# Patient Record
Sex: MALE | Race: WHITE | Employment: OTHER | URBAN - METROPOLITAN AREA
[De-identification: names, ages, dates, MRNs, and addresses within clinical notes are randomized per-mention and may not be internally consistent; named-entity substitution may affect disease eponyms.]

---

## 2017-03-10 ENCOUNTER — TELEPHONE (OUTPATIENT)
Dept: INTERNAL MEDICINE | Age: 72
End: 2017-03-10

## 2017-03-10 RX ORDER — ZOLPIDEM TARTRATE 12.5 MG/1
TABLET, FILM COATED, EXTENDED RELEASE ORAL
Qty: 90 TABLET | Refills: 0 | Status: SHIPPED | OUTPATIENT
Start: 2017-03-10 | End: 2017-04-28 | Stop reason: SDUPTHER

## 2017-03-13 ENCOUNTER — TELEPHONE (OUTPATIENT)
Dept: INTERNAL MEDICINE | Age: 72
End: 2017-03-13

## 2017-03-13 DIAGNOSIS — F41.9 ANXIETY: ICD-10-CM

## 2017-03-13 RX ORDER — LORAZEPAM 0.5 MG/1
TABLET ORAL
Qty: 30 TABLET | Refills: 0 | OUTPATIENT
Start: 2017-03-13 | End: 2017-03-23 | Stop reason: SDUPTHER

## 2017-03-23 ENCOUNTER — OFFICE VISIT (OUTPATIENT)
Dept: INTERNAL MEDICINE | Age: 72
End: 2017-03-23

## 2017-03-23 VITALS
DIASTOLIC BLOOD PRESSURE: 90 MMHG | HEIGHT: 68 IN | BODY MASS INDEX: 31.98 KG/M2 | SYSTOLIC BLOOD PRESSURE: 160 MMHG | WEIGHT: 211 LBS

## 2017-03-23 DIAGNOSIS — F41.9 ANXIETY: ICD-10-CM

## 2017-03-23 DIAGNOSIS — G47.33 OBSTRUCTIVE SLEEP APNEA SYNDROME: Primary | ICD-10-CM

## 2017-03-23 PROCEDURE — 99213 OFFICE O/P EST LOW 20 MIN: CPT | Performed by: INTERNAL MEDICINE

## 2017-03-23 RX ORDER — LORAZEPAM 0.5 MG/1
TABLET ORAL
Qty: 90 TABLET | Refills: 0 | Status: SHIPPED | OUTPATIENT
Start: 2017-03-23 | End: 2017-05-25 | Stop reason: SDUPTHER

## 2017-03-28 ENCOUNTER — OFFICE VISIT (OUTPATIENT)
Dept: DERMATOLOGY | Age: 72
End: 2017-03-28

## 2017-03-28 DIAGNOSIS — C44.519 BCC (BASAL CELL CARCINOMA), TRUNK: ICD-10-CM

## 2017-03-28 DIAGNOSIS — B35.1 ONYCHOMYCOSIS: ICD-10-CM

## 2017-03-28 DIAGNOSIS — Z85.828 HISTORY OF BASAL CELL CARCINOMA: ICD-10-CM

## 2017-03-28 DIAGNOSIS — L82.1 SK (SEBORRHEIC KERATOSIS): ICD-10-CM

## 2017-03-28 DIAGNOSIS — L57.0 AK (ACTINIC KERATOSIS): Primary | ICD-10-CM

## 2017-03-28 PROCEDURE — 99213 OFFICE O/P EST LOW 20 MIN: CPT | Performed by: DERMATOLOGY

## 2017-03-28 PROCEDURE — 17261 DSTRJ MAL LES T/A/L .6-1.0CM: CPT | Performed by: DERMATOLOGY

## 2017-03-31 ENCOUNTER — TELEPHONE (OUTPATIENT)
Dept: DERMATOLOGY | Age: 72
End: 2017-03-31

## 2017-04-11 RX ORDER — TAMSULOSIN HYDROCHLORIDE 0.4 MG/1
CAPSULE ORAL
Qty: 90 CAPSULE | Refills: 0 | Status: SHIPPED | OUTPATIENT
Start: 2017-04-11 | End: 2017-08-12 | Stop reason: SDUPTHER

## 2017-04-20 ENCOUNTER — OFFICE VISIT (OUTPATIENT)
Dept: INTERNAL MEDICINE | Age: 72
End: 2017-04-20

## 2017-04-20 ENCOUNTER — TELEPHONE (OUTPATIENT)
Dept: DERMATOLOGY | Age: 72
End: 2017-04-20

## 2017-04-20 VITALS
BODY MASS INDEX: 31.37 KG/M2 | DIASTOLIC BLOOD PRESSURE: 80 MMHG | SYSTOLIC BLOOD PRESSURE: 132 MMHG | WEIGHT: 207 LBS | HEIGHT: 68 IN

## 2017-04-20 DIAGNOSIS — Z01.818 PRE-OP EXAM: Primary | ICD-10-CM

## 2017-04-20 DIAGNOSIS — L57.0 AK (ACTINIC KERATOSIS): ICD-10-CM

## 2017-04-20 DIAGNOSIS — G47.33 OBSTRUCTIVE SLEEP APNEA SYNDROME: ICD-10-CM

## 2017-04-20 DIAGNOSIS — I25.118 CORONARY ARTERY DISEASE WITH OTHER FORM OF ANGINA PECTORIS: ICD-10-CM

## 2017-04-20 DIAGNOSIS — F51.01 PRIMARY INSOMNIA: ICD-10-CM

## 2017-04-20 DIAGNOSIS — N40.0 BENIGN PROSTATIC HYPERPLASIA WITHOUT LOWER URINARY TRACT SYMPTOMS, UNSPECIFIED MORPHOLOGY: ICD-10-CM

## 2017-04-20 PROCEDURE — 99242 OFF/OP CONSLTJ NEW/EST SF 20: CPT | Performed by: HOSPITALIST

## 2017-04-20 ASSESSMENT — ENCOUNTER SYMPTOMS
EYES NEGATIVE: 1
HEARTBURN: 1
RESPIRATORY NEGATIVE: 1

## 2017-04-28 RX ORDER — ZOLPIDEM TARTRATE 12.5 MG/1
TABLET, FILM COATED, EXTENDED RELEASE ORAL
Qty: 90 TABLET | Refills: 0 | OUTPATIENT
Start: 2017-04-28 | End: 2017-06-23 | Stop reason: SDUPTHER

## 2017-05-23 ENCOUNTER — TELEPHONE (OUTPATIENT)
Dept: INTERNAL MEDICINE | Age: 72
End: 2017-05-23

## 2017-05-25 ENCOUNTER — TELEPHONE (OUTPATIENT)
Dept: INTERNAL MEDICINE | Age: 72
End: 2017-05-25

## 2017-05-25 DIAGNOSIS — F41.9 ANXIETY: ICD-10-CM

## 2017-05-25 RX ORDER — LORAZEPAM 0.5 MG/1
TABLET ORAL
Qty: 90 TABLET | Refills: 0 | OUTPATIENT
Start: 2017-05-25 | End: 2017-06-23 | Stop reason: SDUPTHER

## 2017-05-26 ENCOUNTER — PROCEDURE VISIT (OUTPATIENT)
Dept: DERMATOLOGY | Age: 72
End: 2017-05-26

## 2017-05-26 VITALS
SYSTOLIC BLOOD PRESSURE: 136 MMHG | WEIGHT: 202.4 LBS | DIASTOLIC BLOOD PRESSURE: 82 MMHG | HEART RATE: 85 BPM | BODY MASS INDEX: 30.77 KG/M2

## 2017-05-26 DIAGNOSIS — D48.5 NEOPLASM OF UNCERTAIN BEHAVIOR OF SKIN: Primary | ICD-10-CM

## 2017-05-26 PROCEDURE — 11100 PR BIOPSY OF SKIN LESION: CPT | Performed by: DERMATOLOGY

## 2017-06-01 ENCOUNTER — TELEPHONE (OUTPATIENT)
Dept: DERMATOLOGY | Age: 72
End: 2017-06-01

## 2017-06-23 ENCOUNTER — OFFICE VISIT (OUTPATIENT)
Dept: INTERNAL MEDICINE | Age: 72
End: 2017-06-23

## 2017-06-23 ENCOUNTER — PROCEDURE VISIT (OUTPATIENT)
Dept: DERMATOLOGY | Age: 72
End: 2017-06-23

## 2017-06-23 VITALS — DIASTOLIC BLOOD PRESSURE: 84 MMHG | HEART RATE: 98 BPM | SYSTOLIC BLOOD PRESSURE: 129 MMHG

## 2017-06-23 VITALS
BODY MASS INDEX: 30.01 KG/M2 | HEIGHT: 68 IN | DIASTOLIC BLOOD PRESSURE: 80 MMHG | WEIGHT: 198 LBS | SYSTOLIC BLOOD PRESSURE: 130 MMHG

## 2017-06-23 DIAGNOSIS — G47.33 OBSTRUCTIVE SLEEP APNEA SYNDROME: ICD-10-CM

## 2017-06-23 DIAGNOSIS — F41.9 ANXIETY: ICD-10-CM

## 2017-06-23 DIAGNOSIS — C44.712 BCC (BASAL CELL CARCINOMA), LEG, RIGHT: Primary | ICD-10-CM

## 2017-06-23 DIAGNOSIS — F51.01 PRIMARY INSOMNIA: Primary | ICD-10-CM

## 2017-06-23 PROCEDURE — 99213 OFFICE O/P EST LOW 20 MIN: CPT | Performed by: INTERNAL MEDICINE

## 2017-06-23 PROCEDURE — 11602 EXC TR-EXT MAL+MARG 1.1-2 CM: CPT | Performed by: DERMATOLOGY

## 2017-06-23 PROCEDURE — 12032 INTMD RPR S/A/T/EXT 2.6-7.5: CPT | Performed by: DERMATOLOGY

## 2017-06-23 RX ORDER — ZOLPIDEM TARTRATE 12.5 MG/1
12.5 TABLET, FILM COATED, EXTENDED RELEASE ORAL NIGHTLY PRN
Qty: 90 TABLET | Refills: 0 | Status: SHIPPED | OUTPATIENT
Start: 2017-06-23 | End: 2017-08-14 | Stop reason: SDUPTHER

## 2017-06-23 RX ORDER — LORAZEPAM 0.5 MG/1
TABLET ORAL
Qty: 90 TABLET | Refills: 0 | Status: SHIPPED | OUTPATIENT
Start: 2017-06-23 | End: 2017-08-30 | Stop reason: SDUPTHER

## 2017-06-28 ENCOUNTER — TELEPHONE (OUTPATIENT)
Dept: DERMATOLOGY | Age: 72
End: 2017-06-28

## 2017-07-07 ENCOUNTER — NURSE ONLY (OUTPATIENT)
Dept: DERMATOLOGY | Age: 72
End: 2017-07-07

## 2017-07-07 PROCEDURE — 99024 POSTOP FOLLOW-UP VISIT: CPT | Performed by: DERMATOLOGY

## 2017-07-07 RX ORDER — MINOCYCLINE HYDROCHLORIDE 100 MG/1
100 CAPSULE ORAL 2 TIMES DAILY
Qty: 20 CAPSULE | Refills: 0 | Status: SHIPPED | OUTPATIENT
Start: 2017-07-07 | End: 2017-07-17

## 2017-08-14 ENCOUNTER — TELEPHONE (OUTPATIENT)
Dept: INTERNAL MEDICINE | Age: 72
End: 2017-08-14

## 2017-08-14 DIAGNOSIS — F51.01 PRIMARY INSOMNIA: ICD-10-CM

## 2017-08-14 RX ORDER — ZOLPIDEM TARTRATE 12.5 MG/1
12.5 TABLET, FILM COATED, EXTENDED RELEASE ORAL NIGHTLY PRN
Qty: 30 TABLET | Refills: 4 | OUTPATIENT
Start: 2017-08-14 | End: 2017-12-18 | Stop reason: SDUPTHER

## 2017-08-15 RX ORDER — TAMSULOSIN HYDROCHLORIDE 0.4 MG/1
CAPSULE ORAL
Qty: 90 CAPSULE | Refills: 0 | Status: SHIPPED | OUTPATIENT
Start: 2017-08-15 | End: 2017-08-18 | Stop reason: SDUPTHER

## 2017-08-18 RX ORDER — TAMSULOSIN HYDROCHLORIDE 0.4 MG/1
CAPSULE ORAL
Qty: 90 CAPSULE | Refills: 0 | Status: SHIPPED | OUTPATIENT
Start: 2017-08-18 | End: 2018-02-05 | Stop reason: SDUPTHER

## 2017-08-30 DIAGNOSIS — F41.9 ANXIETY: ICD-10-CM

## 2017-08-31 RX ORDER — LORAZEPAM 0.5 MG/1
TABLET ORAL
Qty: 90 TABLET | Refills: 0 | OUTPATIENT
Start: 2017-08-31 | End: 2017-09-22 | Stop reason: SDUPTHER

## 2017-09-22 ENCOUNTER — OFFICE VISIT (OUTPATIENT)
Dept: INTERNAL MEDICINE | Age: 72
End: 2017-09-22

## 2017-09-22 VITALS
DIASTOLIC BLOOD PRESSURE: 84 MMHG | BODY MASS INDEX: 29.55 KG/M2 | SYSTOLIC BLOOD PRESSURE: 136 MMHG | WEIGHT: 195 LBS | HEIGHT: 68 IN

## 2017-09-22 DIAGNOSIS — I25.118 CORONARY ARTERY DISEASE INVOLVING NATIVE CORONARY ARTERY WITH OTHER FORM OF ANGINA PECTORIS, UNSPECIFIED WHETHER NATIVE OR TRANSPLANTED HEART (HCC): Primary | ICD-10-CM

## 2017-09-22 DIAGNOSIS — F41.9 ANXIETY: ICD-10-CM

## 2017-09-22 PROCEDURE — 99213 OFFICE O/P EST LOW 20 MIN: CPT | Performed by: INTERNAL MEDICINE

## 2017-09-22 RX ORDER — LORAZEPAM 0.5 MG/1
TABLET ORAL
Qty: 90 TABLET | Refills: 0 | Status: SHIPPED | OUTPATIENT
Start: 2017-09-22 | End: 2017-12-18 | Stop reason: SDUPTHER

## 2017-11-01 ENCOUNTER — HOSPITAL ENCOUNTER (OUTPATIENT)
Dept: OTHER | Age: 72
Discharge: HOME | End: 2017-11-30
Attending: INTERNAL MEDICINE | Admitting: INTERNAL MEDICINE

## 2017-11-27 ENCOUNTER — TELEPHONE (OUTPATIENT)
Dept: DERMATOLOGY | Age: 72
End: 2017-11-27

## 2017-11-27 RX ORDER — EFINACONAZOLE 100 MG/ML
SOLUTION TOPICAL
Qty: 4 ML | Refills: 0 | Status: SHIPPED | OUTPATIENT
Start: 2017-11-27 | End: 2018-08-16 | Stop reason: SDUPTHER

## 2017-11-27 NOTE — TELEPHONE ENCOUNTER
Doris joseph/anila [phone]  Doris Nuñez states:   - calling from [facility]   - refill request  Medication  [address]  [phone]  Please call to discuss thanks

## 2017-12-01 ENCOUNTER — HOSPITAL ENCOUNTER (OUTPATIENT)
Dept: OTHER | Age: 72
Discharge: HOME | End: 2017-12-31
Attending: INTERNAL MEDICINE | Admitting: INTERNAL MEDICINE

## 2017-12-18 ENCOUNTER — OFFICE VISIT (OUTPATIENT)
Dept: INTERNAL MEDICINE | Age: 72
End: 2017-12-18

## 2017-12-18 VITALS
WEIGHT: 194 LBS | HEIGHT: 68 IN | DIASTOLIC BLOOD PRESSURE: 86 MMHG | BODY MASS INDEX: 29.4 KG/M2 | SYSTOLIC BLOOD PRESSURE: 120 MMHG

## 2017-12-18 DIAGNOSIS — M19.172 POST-TRAUMATIC OSTEOARTHRITIS OF LEFT ANKLE: Primary | ICD-10-CM

## 2017-12-18 DIAGNOSIS — F41.9 ANXIETY: ICD-10-CM

## 2017-12-18 DIAGNOSIS — F51.01 PRIMARY INSOMNIA: ICD-10-CM

## 2017-12-18 PROCEDURE — 99213 OFFICE O/P EST LOW 20 MIN: CPT | Performed by: INTERNAL MEDICINE

## 2017-12-18 RX ORDER — LORAZEPAM 0.5 MG/1
TABLET ORAL
Qty: 90 TABLET | Refills: 0 | Status: SHIPPED | OUTPATIENT
Start: 2017-12-18 | End: 2018-04-08 | Stop reason: SDUPTHER

## 2017-12-18 RX ORDER — ZOLPIDEM TARTRATE 12.5 MG/1
12.5 TABLET, FILM COATED, EXTENDED RELEASE ORAL NIGHTLY PRN
Qty: 30 TABLET | Refills: 5 | Status: SHIPPED | OUTPATIENT
Start: 2017-12-18 | End: 2018-01-10 | Stop reason: SDUPTHER

## 2017-12-18 ASSESSMENT — PATIENT HEALTH QUESTIONNAIRE - PHQ9
1. LITTLE INTEREST OR PLEASURE IN DOING THINGS: 0
SUM OF ALL RESPONSES TO PHQ9 QUESTIONS 1 & 2: 0
2. FEELING DOWN, DEPRESSED OR HOPELESS: 0
SUM OF ALL RESPONSES TO PHQ QUESTIONS 1-9: 0

## 2017-12-18 NOTE — PROGRESS NOTES
Follow Up Visit  Established Patient Visit    Patient:  Gaurav Verdugo                                               : 1945  Age: 81 y.o. MRN: [id number]  Date : 2017      CHIEF COMPLAINT: Gaurav Verdugo is a 81 y.o. male who presents for follow up. Anxiety-Takes ativan as needed. Sometimes needs 0 pills on 1 day and sometimes needs 2. He often tries to exercise when he feels anxious and sometimes this helps him as well. He is sleeping well. Insomnia-Takes Ambien when needed. Sleeping well, stays active during the day. Ankle pain-Left ankle underwent reconstruction several years ago, now having sharp pains occasionally. The pains occur after he steps on his foot after prolonged rest, such as in his recliner. It is a shooting pain \"like lightning\". Chief Complaint   Patient presents with    Medication Check     Ativan, Ambien       Past Medical History:   Diagnosis Date    Anxiety     BPH (benign prostatic hyperplasia) 2012    CAD (coronary artery disease)     Hyperlipidemia     LBBB (left bundle branch block) 2012    Sleep apnea 2011       Past Surgical History:   Procedure Laterality Date    ANKLE FRACTURE SURGERY         Current Outpatient Prescriptions   Medication Sig Dispense Refill    LORazepam (ATIVAN) 0.5 MG tablet TAKE 1 TABLET BY MOUTH EVERY 8 HOURS AS NEEDED. 90 tablet 0    zolpidem (AMBIEN CR) 12.5 MG extended release tablet Take 1 tablet by mouth nightly as needed for Sleep . 30 tablet 5    JUBLIA 10 % SOLN APPLY TO THE RIGHT BIG TONAIL AND 4TH TOENAIL ONCE A DAY AFTER BATHING 4 mL 0    tamsulosin (FLOMAX) 0.4 MG capsule TAKE 1 CAPSULE BY MOUTH EVERY DAY 90 capsule 0    fluorouracil (EFUDEX) 5 % cream Apply to the forehead, temples and sides of the cheeks 2 times daily for 14 days. 40 g 0    Vitamin D (CHOLECALCIFEROL) 1000 UNITS CAPS capsule Take 1,000 Units by mouth daily.  clopidogrel (PLAVIX) 75 MG tablet Take 75 mg by mouth daily.

## 2018-01-01 ENCOUNTER — HOSPITAL ENCOUNTER (OUTPATIENT)
Dept: OTHER | Age: 73
Discharge: HOME | End: 2018-01-31
Attending: INTERNAL MEDICINE | Admitting: INTERNAL MEDICINE

## 2018-01-03 ENCOUNTER — OFFICE VISIT (OUTPATIENT)
Dept: DERMATOLOGY | Age: 73
End: 2018-01-03

## 2018-01-03 DIAGNOSIS — L82.1 SK (SEBORRHEIC KERATOSIS): ICD-10-CM

## 2018-01-03 DIAGNOSIS — L57.0 AK (ACTINIC KERATOSIS): Primary | ICD-10-CM

## 2018-01-03 DIAGNOSIS — D48.5 NEOPLASM OF UNCERTAIN BEHAVIOR OF SKIN: ICD-10-CM

## 2018-01-03 PROCEDURE — 99213 OFFICE O/P EST LOW 20 MIN: CPT | Performed by: DERMATOLOGY

## 2018-01-03 PROCEDURE — 17261 DSTRJ MAL LES T/A/L .6-1.0CM: CPT | Performed by: DERMATOLOGY

## 2018-01-05 ENCOUNTER — TELEPHONE (OUTPATIENT)
Dept: DERMATOLOGY | Age: 73
End: 2018-01-05

## 2018-01-10 DIAGNOSIS — F51.01 PRIMARY INSOMNIA: ICD-10-CM

## 2018-01-15 RX ORDER — ZOLPIDEM TARTRATE 12.5 MG/1
TABLET, FILM COATED, EXTENDED RELEASE ORAL
Qty: 30 TABLET | Refills: 2 | Status: SHIPPED | OUTPATIENT
Start: 2018-01-15 | End: 2018-04-11 | Stop reason: SDUPTHER

## 2018-02-01 ENCOUNTER — HOSPITAL ENCOUNTER (OUTPATIENT)
Dept: OTHER | Age: 73
Discharge: HOME | End: 2018-02-28
Attending: INTERNAL MEDICINE | Admitting: INTERNAL MEDICINE

## 2018-02-07 RX ORDER — TAMSULOSIN HYDROCHLORIDE 0.4 MG/1
CAPSULE ORAL
Qty: 90 CAPSULE | Refills: 0 | Status: SHIPPED | OUTPATIENT
Start: 2018-02-07 | End: 2018-05-06 | Stop reason: SDUPTHER

## 2018-03-01 ENCOUNTER — HOSPITAL ENCOUNTER (OUTPATIENT)
Dept: OTHER | Age: 73
Discharge: HOME | End: 2018-03-31
Attending: INTERNAL MEDICINE | Admitting: INTERNAL MEDICINE

## 2018-04-01 ENCOUNTER — HOSPITAL ENCOUNTER (OUTPATIENT)
Dept: OTHER | Age: 73
Discharge: HOME | End: 2018-04-30
Attending: INTERNAL MEDICINE | Admitting: INTERNAL MEDICINE

## 2018-04-08 DIAGNOSIS — F41.9 ANXIETY: ICD-10-CM

## 2018-04-11 ENCOUNTER — TELEPHONE (OUTPATIENT)
Dept: INTERNAL MEDICINE | Age: 73
End: 2018-04-11

## 2018-04-11 DIAGNOSIS — F51.01 PRIMARY INSOMNIA: ICD-10-CM

## 2018-04-11 RX ORDER — ZOLPIDEM TARTRATE 12.5 MG/1
TABLET, FILM COATED, EXTENDED RELEASE ORAL
Qty: 30 TABLET | Refills: 0 | OUTPATIENT
Start: 2018-04-11 | End: 2018-06-27 | Stop reason: SDUPTHER

## 2018-04-11 RX ORDER — LORAZEPAM 0.5 MG/1
TABLET ORAL
Qty: 90 TABLET | Refills: 0 | Status: SHIPPED | OUTPATIENT
Start: 2018-04-11 | End: 2018-05-11

## 2018-04-25 ENCOUNTER — OFFICE VISIT (OUTPATIENT)
Dept: INTERNAL MEDICINE | Age: 73
End: 2018-04-25

## 2018-04-25 VITALS
DIASTOLIC BLOOD PRESSURE: 86 MMHG | BODY MASS INDEX: 29.86 KG/M2 | WEIGHT: 197 LBS | SYSTOLIC BLOOD PRESSURE: 138 MMHG | HEIGHT: 68 IN

## 2018-04-25 DIAGNOSIS — G47.33 OSA ON CPAP: ICD-10-CM

## 2018-04-25 DIAGNOSIS — F51.01 PRIMARY INSOMNIA: Primary | ICD-10-CM

## 2018-04-25 DIAGNOSIS — F41.9 ANXIETY: ICD-10-CM

## 2018-04-25 DIAGNOSIS — Z99.89 OSA ON CPAP: ICD-10-CM

## 2018-04-25 DIAGNOSIS — J02.9 PHARYNGITIS, UNSPECIFIED ETIOLOGY: ICD-10-CM

## 2018-04-25 PROCEDURE — 99213 OFFICE O/P EST LOW 20 MIN: CPT | Performed by: INTERNAL MEDICINE

## 2018-04-25 RX ORDER — METHYLPREDNISOLONE 4 MG/1
TABLET ORAL
Qty: 1 KIT | Refills: 0 | Status: SHIPPED | OUTPATIENT
Start: 2018-04-25 | End: 2018-05-01

## 2018-05-01 ENCOUNTER — HOSPITAL ENCOUNTER (OUTPATIENT)
Dept: OTHER | Age: 73
Discharge: HOME | End: 2018-05-31
Attending: INTERNAL MEDICINE | Admitting: INTERNAL MEDICINE

## 2018-05-07 RX ORDER — TAMSULOSIN HYDROCHLORIDE 0.4 MG/1
CAPSULE ORAL
Qty: 90 CAPSULE | Refills: 0 | Status: SHIPPED | OUTPATIENT
Start: 2018-05-07 | End: 2018-08-02 | Stop reason: SDUPTHER

## 2018-06-01 ENCOUNTER — HOSPITAL ENCOUNTER (OUTPATIENT)
Dept: OTHER | Age: 73
Discharge: HOME | End: 2018-06-30
Attending: INTERNAL MEDICINE | Admitting: INTERNAL MEDICINE

## 2018-06-27 ENCOUNTER — TELEPHONE (OUTPATIENT)
Dept: INTERNAL MEDICINE | Age: 73
End: 2018-06-27

## 2018-06-27 DIAGNOSIS — F51.01 PRIMARY INSOMNIA: ICD-10-CM

## 2018-06-27 RX ORDER — ZOLPIDEM TARTRATE 12.5 MG/1
TABLET, FILM COATED, EXTENDED RELEASE ORAL
Qty: 30 TABLET | Refills: 1 | OUTPATIENT
Start: 2018-06-27 | End: 2018-07-03 | Stop reason: SDUPTHER

## 2018-07-01 ENCOUNTER — HOSPITAL ENCOUNTER (OUTPATIENT)
Dept: OTHER | Age: 73
Discharge: HOME | End: 2018-07-01
Attending: INTERNAL MEDICINE | Admitting: INTERNAL MEDICINE

## 2018-07-03 DIAGNOSIS — F51.01 PRIMARY INSOMNIA: ICD-10-CM

## 2018-07-03 RX ORDER — ZOLPIDEM TARTRATE 12.5 MG/1
TABLET, FILM COATED, EXTENDED RELEASE ORAL
Qty: 30 TABLET | Refills: 0 | OUTPATIENT
Start: 2018-07-03 | End: 2018-07-25 | Stop reason: SDUPTHER

## 2018-07-25 ENCOUNTER — OFFICE VISIT (OUTPATIENT)
Dept: INTERNAL MEDICINE | Age: 73
End: 2018-07-25

## 2018-07-25 VITALS
WEIGHT: 198 LBS | DIASTOLIC BLOOD PRESSURE: 82 MMHG | SYSTOLIC BLOOD PRESSURE: 130 MMHG | HEIGHT: 68 IN | BODY MASS INDEX: 30.01 KG/M2

## 2018-07-25 DIAGNOSIS — Z23 NEED FOR ZOSTAVAX ADMINISTRATION: Primary | ICD-10-CM

## 2018-07-25 DIAGNOSIS — F51.01 PRIMARY INSOMNIA: ICD-10-CM

## 2018-07-25 PROCEDURE — 99213 OFFICE O/P EST LOW 20 MIN: CPT | Performed by: INTERNAL MEDICINE

## 2018-07-25 RX ORDER — ZOLPIDEM TARTRATE 12.5 MG/1
TABLET, FILM COATED, EXTENDED RELEASE ORAL
Qty: 30 TABLET | Refills: 2 | Status: SHIPPED | OUTPATIENT
Start: 2018-07-25 | End: 2018-10-25 | Stop reason: SDUPTHER

## 2018-07-25 ASSESSMENT — ENCOUNTER SYMPTOMS
GASTROINTESTINAL NEGATIVE: 1
CHEST TIGHTNESS: 0
SHORTNESS OF BREATH: 0

## 2018-07-25 NOTE — PROGRESS NOTES
Subjective:      Patient ID: Kenny Hunter is a 81 y.o. male. HPI   In for a med refills. The medications are helping him sleep and deal with stress. He has not had any other symptoms and is doing well. Review of Systems   Constitutional: Negative for appetite change, chills, fever and unexpected weight change. Respiratory: Negative for chest tightness and shortness of breath. Cardiovascular: Negative for chest pain, palpitations and leg swelling. Gastrointestinal: Negative. Neurological: Negative for dizziness, tremors, seizures, syncope, light-headedness and headaches. Psychiatric/Behavioral: Negative for agitation, behavioral problems, confusion, decreased concentration, dysphoric mood and hallucinations. The patient is not nervous/anxious. Objective:   Physical Exam   Constitutional: He appears well-developed and well-nourished. Cardiovascular: Normal rate and regular rhythm. Exam reveals no gallop and no friction rub. No murmur heard. Pulmonary/Chest: Effort normal and breath sounds normal. No respiratory distress. He has no wheezes. He has no rales. He exhibits no tenderness. Abdominal: Soft. Bowel sounds are normal. He exhibits no distension and no mass. There is no tenderness. There is no rebound and no guarding. Musculoskeletal: He exhibits no edema. Psychiatric: He has a normal mood and affect.  His behavior is normal. Judgment and thought content normal.       Assessment:      Insomnia controlled with Ambien continue present meds no evidence of addiction or dependency      Plan:      Ambien prescription given follow-up 3 months

## 2018-08-02 DIAGNOSIS — F41.9 ANXIETY: Primary | ICD-10-CM

## 2018-08-02 RX ORDER — TAMSULOSIN HYDROCHLORIDE 0.4 MG/1
CAPSULE ORAL
Qty: 90 CAPSULE | Refills: 0 | Status: SHIPPED | OUTPATIENT
Start: 2018-08-02 | End: 2018-10-29 | Stop reason: SDUPTHER

## 2018-08-02 RX ORDER — TAMSULOSIN HYDROCHLORIDE 0.4 MG/1
CAPSULE ORAL
Qty: 90 CAPSULE | Refills: 0 | OUTPATIENT
Start: 2018-08-02

## 2018-08-03 RX ORDER — LORAZEPAM 0.5 MG/1
TABLET ORAL
Qty: 90 TABLET | Refills: 0 | OUTPATIENT
Start: 2018-08-03 | End: 2018-08-04 | Stop reason: SDUPTHER

## 2018-08-04 DIAGNOSIS — F41.9 ANXIETY: ICD-10-CM

## 2018-08-06 RX ORDER — LORAZEPAM 0.5 MG/1
TABLET ORAL
Qty: 90 TABLET | Refills: 0 | OUTPATIENT
Start: 2018-08-06 | End: 2018-09-05

## 2018-08-06 NOTE — TELEPHONE ENCOUNTER
Patient was seen 7/25 and is looking for the refill of his lorazepam 0.5 mg. Patient says pharmacy has made several requests to refill.     Please call patient with update on prescription

## 2018-08-16 RX ORDER — EFINACONAZOLE 100 MG/ML
SOLUTION TOPICAL
Qty: 4 ML | Refills: 0 | Status: SHIPPED | OUTPATIENT
Start: 2018-08-16

## 2018-09-24 DIAGNOSIS — F41.9 ANXIETY: Primary | ICD-10-CM

## 2018-09-24 DIAGNOSIS — F41.9 ANXIETY: ICD-10-CM

## 2018-09-24 RX ORDER — LORAZEPAM 1 MG/1
TABLET ORAL
Qty: 45 TABLET | Refills: 0 | OUTPATIENT
Start: 2018-09-24 | End: 2018-09-24 | Stop reason: SDUPTHER

## 2018-09-25 RX ORDER — LORAZEPAM 1 MG/1
TABLET ORAL
Qty: 45 TABLET | Refills: 0 | OUTPATIENT
Start: 2018-09-25 | End: 2018-10-25

## 2018-10-05 ENCOUNTER — TELEPHONE (OUTPATIENT)
Dept: INTERNAL MEDICINE CLINIC | Age: 73
End: 2018-10-05

## 2018-10-05 NOTE — TELEPHONE ENCOUNTER
Pt came in because he was under the impression that his refill was sent in a few days ago  Pt never got the refill request sent in   He would like zolpidem (AMBIEN CR) 12.5 MG asap   He is currently out of this  Please advise

## 2018-10-08 DIAGNOSIS — E78.5 HYPERLIPIDEMIA, UNSPECIFIED HYPERLIPIDEMIA TYPE: ICD-10-CM

## 2018-10-08 DIAGNOSIS — Z95.5 S/P CORONARY ARTERY STENT PLACEMENT: ICD-10-CM

## 2018-10-08 DIAGNOSIS — I45.10 RBBB (RIGHT BUNDLE BRANCH BLOCK): ICD-10-CM

## 2018-10-08 DIAGNOSIS — R55 SYNCOPE, NEAR: ICD-10-CM

## 2018-10-08 RX ORDER — CLOPIDOGREL BISULFATE 75 MG/1
75 TABLET ORAL DAILY
Qty: 90 TABLET | Refills: 0 | Status: SHIPPED | OUTPATIENT
Start: 2018-10-08 | End: 2019-02-07 | Stop reason: SDUPTHER

## 2018-10-23 ENCOUNTER — OFFICE VISIT (OUTPATIENT)
Dept: DERMATOLOGY | Age: 73
End: 2018-10-23
Payer: COMMERCIAL

## 2018-10-23 DIAGNOSIS — L57.0 AK (ACTINIC KERATOSIS): Primary | ICD-10-CM

## 2018-10-23 DIAGNOSIS — C44.519 BCC (BASAL CELL CARCINOMA), TRUNK: ICD-10-CM

## 2018-10-23 PROCEDURE — 17004 DESTROY PREMAL LESIONS 15/>: CPT | Performed by: DERMATOLOGY

## 2018-10-23 PROCEDURE — 17260 DSTRJ MAL LES T/A/L 0.5 CM/<: CPT | Performed by: DERMATOLOGY

## 2018-10-23 NOTE — PATIENT INSTRUCTIONS
film of white petrolatum (Vaseline©).  You do not need to cover the area, but can if you prefer.  Do NOT allow the site to become dry or crusted, or attempt to dry it out with rubbing alcohol or hydrogen peroxide.  Continue this regimen until the area is pink and healed. Depending on the size and location of your cryosurgery site, healing may take 2 to 4 weeks.  The area may continue to be pink for several weeks, and over the next few months may become darker or lighter than the surrounding skin. This may be a permanent change.

## 2018-10-25 ENCOUNTER — OFFICE VISIT (OUTPATIENT)
Dept: INTERNAL MEDICINE CLINIC | Age: 73
End: 2018-10-25
Payer: COMMERCIAL

## 2018-10-25 VITALS
BODY MASS INDEX: 30.92 KG/M2 | DIASTOLIC BLOOD PRESSURE: 80 MMHG | WEIGHT: 204 LBS | SYSTOLIC BLOOD PRESSURE: 140 MMHG | HEIGHT: 68 IN

## 2018-10-25 DIAGNOSIS — I25.119 CORONARY ARTERY DISEASE INVOLVING NATIVE CORONARY ARTERY WITH ANGINA PECTORIS, UNSPECIFIED WHETHER NATIVE OR TRANSPLANTED HEART (HCC): Primary | ICD-10-CM

## 2018-10-25 DIAGNOSIS — E78.00 PURE HYPERCHOLESTEROLEMIA: ICD-10-CM

## 2018-10-25 DIAGNOSIS — F41.9 ANXIETY: ICD-10-CM

## 2018-10-25 DIAGNOSIS — F51.01 PRIMARY INSOMNIA: ICD-10-CM

## 2018-10-25 LAB — DERMATOLOGY PATHOLOGY REPORT: ABNORMAL

## 2018-10-25 PROCEDURE — 99213 OFFICE O/P EST LOW 20 MIN: CPT | Performed by: INTERNAL MEDICINE

## 2018-10-25 RX ORDER — ZOLPIDEM TARTRATE 12.5 MG/1
TABLET, FILM COATED, EXTENDED RELEASE ORAL
Qty: 30 TABLET | Refills: 2 | Status: SHIPPED | OUTPATIENT
Start: 2018-10-25 | End: 2019-04-12 | Stop reason: SDUPTHER

## 2018-10-25 RX ORDER — LORAZEPAM 0.5 MG/1
TABLET ORAL
Qty: 90 TABLET | Refills: 2 | Status: SHIPPED | OUTPATIENT
Start: 2018-10-25 | End: 2019-01-03 | Stop reason: SDUPTHER

## 2018-10-25 ASSESSMENT — PATIENT HEALTH QUESTIONNAIRE - PHQ9
2. FEELING DOWN, DEPRESSED OR HOPELESS: 0
SUM OF ALL RESPONSES TO PHQ QUESTIONS 1-9: 0
1. LITTLE INTEREST OR PLEASURE IN DOING THINGS: 0
SUM OF ALL RESPONSES TO PHQ9 QUESTIONS 1 & 2: 0
SUM OF ALL RESPONSES TO PHQ QUESTIONS 1-9: 0

## 2018-10-29 RX ORDER — TAMSULOSIN HYDROCHLORIDE 0.4 MG/1
CAPSULE ORAL
Qty: 90 CAPSULE | Refills: 0 | Status: SHIPPED | OUTPATIENT
Start: 2018-10-29 | End: 2019-02-01 | Stop reason: SDUPTHER

## 2018-11-07 ENCOUNTER — TELEPHONE (OUTPATIENT)
Dept: INTERNAL MEDICINE CLINIC | Age: 73
End: 2018-11-07

## 2018-11-07 DIAGNOSIS — F51.01 PRIMARY INSOMNIA: ICD-10-CM

## 2018-11-07 NOTE — TELEPHONE ENCOUNTER
No can not change. Patient came in to the office to discuss getting prescription. What he was requesting is a prescription of the Ambien CR 12.5 mg afua and also a certain generic of Ambien CR - manufactured by [employer]. The current brand of Ambien cr has not been working for him, the pharmacy changed manufacturers. He will let us know which prescription works for him.

## 2018-11-08 RX ORDER — ZOLPIDEM TARTRATE 12.5 MG/1
12.5 TABLET, FILM COATED, EXTENDED RELEASE ORAL NIGHTLY PRN
Qty: 7 TABLET | Refills: 0 | Status: SHIPPED | OUTPATIENT
Start: 2018-11-08 | End: 2019-01-24 | Stop reason: CLARIF

## 2018-11-26 ENCOUNTER — TELEPHONE (OUTPATIENT)
Dept: INTERNAL MEDICINE CLINIC | Age: 73
End: 2018-11-26

## 2018-11-26 DIAGNOSIS — F51.01 PRIMARY INSOMNIA: ICD-10-CM

## 2018-11-26 RX ORDER — ZOLPIDEM TARTRATE 12.5 MG/1
12.5 TABLET, FILM COATED, EXTENDED RELEASE ORAL NIGHTLY PRN
Qty: 90 TABLET | Refills: 0 | OUTPATIENT
Start: 2018-11-26 | End: 2018-12-19 | Stop reason: SDUPTHER

## 2018-12-19 ENCOUNTER — TELEPHONE (OUTPATIENT)
Dept: INTERNAL MEDICINE CLINIC | Age: 73
End: 2018-12-19

## 2018-12-19 DIAGNOSIS — F51.01 PRIMARY INSOMNIA: ICD-10-CM

## 2018-12-19 RX ORDER — ZOLPIDEM TARTRATE 12.5 MG/1
12.5 TABLET, FILM COATED, EXTENDED RELEASE ORAL NIGHTLY PRN
Qty: 30 TABLET | Refills: 0 | OUTPATIENT
Start: 2018-12-19 | End: 2019-01-24 | Stop reason: CLARIF

## 2019-01-02 ENCOUNTER — TELEPHONE (OUTPATIENT)
Dept: INTERNAL MEDICINE CLINIC | Age: 74
End: 2019-01-02

## 2019-01-02 DIAGNOSIS — F41.9 ANXIETY: ICD-10-CM

## 2019-01-03 RX ORDER — LORAZEPAM 0.5 MG/1
TABLET ORAL
Qty: 90 TABLET | Refills: 0 | OUTPATIENT
Start: 2019-01-03 | End: 2019-05-24 | Stop reason: SDUPTHER

## 2019-01-24 ENCOUNTER — OFFICE VISIT (OUTPATIENT)
Dept: INTERNAL MEDICINE CLINIC | Age: 74
End: 2019-01-24
Payer: COMMERCIAL

## 2019-01-24 VITALS
BODY MASS INDEX: 30.62 KG/M2 | HEIGHT: 68 IN | DIASTOLIC BLOOD PRESSURE: 86 MMHG | SYSTOLIC BLOOD PRESSURE: 150 MMHG | WEIGHT: 202 LBS

## 2019-01-24 DIAGNOSIS — G47.33 OBSTRUCTIVE SLEEP APNEA SYNDROME: Primary | ICD-10-CM

## 2019-01-24 DIAGNOSIS — F51.01 PRIMARY INSOMNIA: ICD-10-CM

## 2019-01-24 PROCEDURE — 99213 OFFICE O/P EST LOW 20 MIN: CPT | Performed by: INTERNAL MEDICINE

## 2019-02-01 RX ORDER — TAMSULOSIN HYDROCHLORIDE 0.4 MG/1
CAPSULE ORAL
Qty: 90 CAPSULE | Refills: 2 | Status: SHIPPED | OUTPATIENT
Start: 2019-02-01 | End: 2019-07-22 | Stop reason: SDUPTHER

## 2019-02-07 DIAGNOSIS — Z95.5 S/P CORONARY ARTERY STENT PLACEMENT: ICD-10-CM

## 2019-02-07 DIAGNOSIS — R55 SYNCOPE, NEAR: ICD-10-CM

## 2019-02-07 DIAGNOSIS — I45.10 RBBB (RIGHT BUNDLE BRANCH BLOCK): ICD-10-CM

## 2019-02-07 DIAGNOSIS — E78.5 HYPERLIPIDEMIA, UNSPECIFIED HYPERLIPIDEMIA TYPE: ICD-10-CM

## 2019-02-07 RX ORDER — CLOPIDOGREL BISULFATE 75 MG/1
75 TABLET ORAL DAILY
Qty: 90 TABLET | Refills: 1 | Status: SHIPPED | OUTPATIENT
Start: 2019-02-07 | End: 2019-07-11 | Stop reason: SDUPTHER

## 2019-02-26 ENCOUNTER — OFFICE VISIT (OUTPATIENT)
Dept: DERMATOLOGY | Age: 74
End: 2019-02-26
Payer: COMMERCIAL

## 2019-02-26 DIAGNOSIS — C44.612 BASAL CELL CARCINOMA (BCC) OF RIGHT UPPER ARM: ICD-10-CM

## 2019-02-26 DIAGNOSIS — L57.0 AK (ACTINIC KERATOSIS): Primary | ICD-10-CM

## 2019-02-26 PROCEDURE — 17004 DESTROY PREMAL LESIONS 15/>: CPT | Performed by: DERMATOLOGY

## 2019-02-26 PROCEDURE — 17262 DSTRJ MAL LES T/A/L 1.1-2.0: CPT | Performed by: DERMATOLOGY

## 2019-02-26 PROCEDURE — 17260 DSTRJ MAL LES T/A/L 0.5 CM/<: CPT | Performed by: DERMATOLOGY

## 2019-02-28 LAB — DERMATOLOGY PATHOLOGY REPORT: ABNORMAL

## 2019-03-14 ENCOUNTER — TELEPHONE (OUTPATIENT)
Dept: INTERNAL MEDICINE CLINIC | Age: 74
End: 2019-03-14

## 2019-04-12 DIAGNOSIS — F51.01 PRIMARY INSOMNIA: ICD-10-CM

## 2019-04-12 RX ORDER — ZOLPIDEM TARTRATE 12.5 MG/1
TABLET, FILM COATED, EXTENDED RELEASE ORAL
Qty: 90 TABLET | Refills: 0 | Status: SHIPPED | OUTPATIENT
Start: 2019-04-12 | End: 2019-04-24 | Stop reason: SDUPTHER

## 2019-04-24 ENCOUNTER — OFFICE VISIT (OUTPATIENT)
Dept: INTERNAL MEDICINE CLINIC | Age: 74
End: 2019-04-24

## 2019-04-24 VITALS
WEIGHT: 204 LBS | DIASTOLIC BLOOD PRESSURE: 80 MMHG | BODY MASS INDEX: 30.92 KG/M2 | SYSTOLIC BLOOD PRESSURE: 130 MMHG | HEIGHT: 68 IN

## 2019-04-24 DIAGNOSIS — K21.9 GASTROESOPHAGEAL REFLUX DISEASE, ESOPHAGITIS PRESENCE NOT SPECIFIED: ICD-10-CM

## 2019-04-24 DIAGNOSIS — F51.01 PRIMARY INSOMNIA: Primary | ICD-10-CM

## 2019-04-24 PROCEDURE — 99213 OFFICE O/P EST LOW 20 MIN: CPT | Performed by: INTERNAL MEDICINE

## 2019-04-24 RX ORDER — OMEPRAZOLE 20 MG/1
20 CAPSULE, DELAYED RELEASE ORAL DAILY
Qty: 30 CAPSULE | Refills: 2 | Status: SHIPPED | OUTPATIENT
Start: 2019-04-24 | End: 2019-07-11 | Stop reason: SDUPTHER

## 2019-04-24 RX ORDER — ZOLPIDEM TARTRATE 12.5 MG/1
TABLET, FILM COATED, EXTENDED RELEASE ORAL
Qty: 90 TABLET | Refills: 0 | Status: SHIPPED | OUTPATIENT
Start: 2019-04-24 | End: 2019-07-24 | Stop reason: SDUPTHER

## 2019-04-24 ASSESSMENT — PATIENT HEALTH QUESTIONNAIRE - PHQ9
SUM OF ALL RESPONSES TO PHQ QUESTIONS 1-9: 0
2. FEELING DOWN, DEPRESSED OR HOPELESS: 0
SUM OF ALL RESPONSES TO PHQ9 QUESTIONS 1 & 2: 0
1. LITTLE INTEREST OR PLEASURE IN DOING THINGS: 0
SUM OF ALL RESPONSES TO PHQ QUESTIONS 1-9: 0

## 2019-04-24 NOTE — PROGRESS NOTES
Sexual activity: None   Lifestyle    Physical activity:     Days per week: None     Minutes per session: None    Stress: None   Relationships    Social connections:     Talks on phone: None     Gets together: None     Attends Bahai service: None     Active member of club or organization: None     Attends meetings of clubs or organizations: None     Relationship status: None    Intimate partner violence:     Fear of current or ex partner: None     Emotionally abused: None     Physically abused: None     Forced sexual activity: None   Other Topics Concern    None   Social History Narrative    None         Allergies:  Patient has no known allergies. Current Medications:    Prior to Admission medications    Medication Sig Start Date End Date Taking? Authorizing Provider   zolpidem (AMBIEN CR) 12.5 MG extended release tablet TAKE ONE TABLET BY MOUTH NIGHTLY AS NEEDED FOR SLEEP- KAMARI 4/24/19 7/24/19 Yes Henrietta Squires MD   omeprazole (PRILOSEC) 20 MG delayed release capsule Take 1 capsule by mouth daily 4/24/19  Yes Henrietta Squires MD   clopidogrel (PLAVIX) 75 MG tablet Take 1 tablet by mouth daily 2/7/19  Yes Henrietta Squires MD   tamsulosin (FLOMAX) 0.4 MG capsule TAKE 1 CAPSULE BY MOUTH EVERY DAY 2/1/19  Yes Henrietta Squires MD   JUBLIA 10 % SOLN APPLY TO THE RIGHT BIG TONAIL AND 4TH TOENAIL ONCE A DAY AFTER BATHING 8/16/18  Yes Flavio Marquez MD   fluorouracil (EFUDEX) 5 % cream Apply to the forehead, temples and sides of the cheeks 2 times daily for 14 days. 2/17/14  Yes Flavio Marquez MD   Vitamin D (CHOLECALCIFEROL) 1000 UNITS CAPS capsule Take 1,000 Units by mouth daily. Yes Historical Provider, MD   aspirin 81 MG EC tablet Take 81 mg by mouth M,W,F only   Yes Historical Provider, MD   rosuvastatin (CRESTOR) 20 MG tablet Take 20 mg by mouth daily. Yes Historical Provider, MD   Methylcellulose, Laxative, (CITRUCEL PO) Take  by mouth.      Yes Historical Provider, MD       Physical Exam:  Vital Signs: BP 130/80   Ht 5' 8\" (1.727 m)   Wt 204 lb (92.5 kg)   BMI 31.02 kg/m²   General: Patient appears  non-toxic  HENT: Atraumatic, normocephalic, oral mucosa moist  Lungs:  Clear bilaterally  Heart: Regular rate and rhythm  Abdomen: Non-distended, soft, non-tender  Extremities: No edema  Neuro: Nonfocal    Medical Decision Making and Plan:  Pertinent Labs & Imaging studies reviewed. (See chart for details)      1. Primary insomnia  His problem is stable no evidence addiction or dependency will continue present meds  - zolpidem (AMBIEN CR) 12.5 MG extended release tablet; TAKE ONE TABLET BY MOUTH NIGHTLY AS NEEDED FOR SLEEP- KAMARI  Dispense: 90 tablet; Refill: 0    2.  Gastroesophageal reflux disease, esophagitis presence not specified  Trial of  Prilosec

## 2019-05-24 ENCOUNTER — TELEPHONE (OUTPATIENT)
Dept: INTERNAL MEDICINE CLINIC | Age: 74
End: 2019-05-24

## 2019-05-24 DIAGNOSIS — F41.9 ANXIETY: Primary | ICD-10-CM

## 2019-05-24 RX ORDER — LORAZEPAM 0.5 MG/1
TABLET ORAL
Qty: 90 TABLET | Refills: 0 | Status: SHIPPED | OUTPATIENT
Start: 2019-05-24 | End: 2019-07-24 | Stop reason: SDUPTHER

## 2019-05-24 NOTE — TELEPHONE ENCOUNTER
Pt stated the following:  Need refill on   LORazepam (ATIVAN) 0.5 MG tablet   Last Office Visit: 4/24/19  Future Office visit: 7/24/19  Pharmacy on file verified  Pt requesting a call back from [facility].   Please call

## 2019-05-24 NOTE — TELEPHONE ENCOUNTER
Last refill 04/22/2019. Appointment is up to date.04.24.2019    Message has been left. Script faxed.

## 2019-07-08 PROCEDURE — 9900000038 HC CARDIAC REHAB PHASE 3 - MONTHLY

## 2019-07-10 ENCOUNTER — TELEPHONE (OUTPATIENT)
Dept: INTERNAL MEDICINE CLINIC | Age: 74
End: 2019-07-10

## 2019-07-10 DIAGNOSIS — R55 SYNCOPE, NEAR: ICD-10-CM

## 2019-07-10 DIAGNOSIS — I25.10 CAD (CORONARY ARTERY DISEASE): ICD-10-CM

## 2019-07-10 DIAGNOSIS — E78.5 HYPERLIPEMIA: ICD-10-CM

## 2019-07-10 DIAGNOSIS — F51.01 PRIMARY INSOMNIA: ICD-10-CM

## 2019-07-10 DIAGNOSIS — Z95.5 S/P CORONARY ARTERY STENT PLACEMENT: ICD-10-CM

## 2019-07-10 DIAGNOSIS — F41.9 ANXIETY: ICD-10-CM

## 2019-07-10 DIAGNOSIS — E78.5 HYPERLIPIDEMIA, UNSPECIFIED HYPERLIPIDEMIA TYPE: ICD-10-CM

## 2019-07-10 DIAGNOSIS — I45.10 RBBB (RIGHT BUNDLE BRANCH BLOCK): ICD-10-CM

## 2019-07-11 RX ORDER — ROSUVASTATIN CALCIUM 20 MG/1
20 TABLET, COATED ORAL DAILY
Qty: 90 TABLET | Refills: 3 | Status: SHIPPED | OUTPATIENT
Start: 2019-07-11 | End: 2020-08-18

## 2019-07-11 RX ORDER — OMEPRAZOLE 20 MG/1
20 CAPSULE, DELAYED RELEASE ORAL DAILY
Qty: 90 CAPSULE | Refills: 3 | Status: SHIPPED | OUTPATIENT
Start: 2019-07-11 | End: 2020-08-18

## 2019-07-11 RX ORDER — CLOPIDOGREL BISULFATE 75 MG/1
75 TABLET ORAL DAILY
Qty: 90 TABLET | Refills: 3 | Status: SHIPPED | OUTPATIENT
Start: 2019-07-11 | End: 2020-08-18

## 2019-07-12 NOTE — TELEPHONE ENCOUNTER
Spoke to pt explained to him that he will not run out he needs to call a few days in advance so we can call it in.  Pt is informed that 90 day will be be ok

## 2019-07-17 ENCOUNTER — OFFICE VISIT (OUTPATIENT)
Dept: DERMATOLOGY | Age: 74
End: 2019-07-17
Payer: MEDICARE

## 2019-07-17 DIAGNOSIS — L82.1 SEBORRHEIC KERATOSIS: ICD-10-CM

## 2019-07-17 DIAGNOSIS — Z85.828 HISTORY OF BASAL CELL CARCINOMA: ICD-10-CM

## 2019-07-17 DIAGNOSIS — L57.0 ACTINIC KERATOSIS: Primary | ICD-10-CM

## 2019-07-17 PROCEDURE — 17000 DESTRUCT PREMALG LESION: CPT | Performed by: DERMATOLOGY

## 2019-07-17 PROCEDURE — 3017F COLORECTAL CA SCREEN DOC REV: CPT | Performed by: DERMATOLOGY

## 2019-07-17 PROCEDURE — 4040F PNEUMOC VAC/ADMIN/RCVD: CPT | Performed by: DERMATOLOGY

## 2019-07-17 PROCEDURE — 99213 OFFICE O/P EST LOW 20 MIN: CPT | Performed by: DERMATOLOGY

## 2019-07-17 PROCEDURE — G8598 ASA/ANTIPLAT THER USED: HCPCS | Performed by: DERMATOLOGY

## 2019-07-17 PROCEDURE — 1123F ACP DISCUSS/DSCN MKR DOCD: CPT | Performed by: DERMATOLOGY

## 2019-07-17 PROCEDURE — 17003 DESTRUCT PREMALG LES 2-14: CPT | Performed by: DERMATOLOGY

## 2019-07-17 PROCEDURE — G8427 DOCREV CUR MEDS BY ELIG CLIN: HCPCS | Performed by: DERMATOLOGY

## 2019-07-17 PROCEDURE — G8417 CALC BMI ABV UP PARAM F/U: HCPCS | Performed by: DERMATOLOGY

## 2019-07-17 PROCEDURE — 1036F TOBACCO NON-USER: CPT | Performed by: DERMATOLOGY

## 2019-07-17 NOTE — PROGRESS NOTES
[facility]  Yael Quevedo MD  [phone]      Norah Robbins  1945    72 y.o. male     Date of Visit: 7/17/2019    Chief Complaint: f/u for AKs and BCCs    History of Present Illness:    1. He returns today to follow-up for history of actinic keratoses-has for several new lesions on the scalp and face today. 2.  He also complains of a stable asymptomatic lesion on the right shoulder. 3.  He has a history of multiple BCCs-denies any signs of recurrence. Small nodular BCCs on the right anterior upper arm and right mid arm-treated with curettage at the time of biopsy on 2/26/2019. Nodular BCC on the left upper chest history with curettage at the time of biopsy in October 2018. Superficial BCC of the right supraclavicular region-treated with curettage at time of biopsy on 1/3/2018.     Nodular BCC on the right thigh - excised on 6/23/17.    Superficial BCC on the right central upper back - treated with curettage on 3/28/17. Nodular BCC on the left central lower back - treated with curettage on 3/28/17. Nodular BCC on the right mid thigh-treated with curettage on 10/7/2016. Superficial basal cell carcinoma left upper chest-treated with curettage on 9/21/2016. Nodular BCC of the right mid central back-treated with curettage on 9/21/2016.     Superficial BCC of the left central lower back-treated with curettage on 3/3/2016.     4 BCCs treated with ED&C on 9/2015.    Nodular BCC on the right lateral shoulder, superior  Nodular basal cell carcinoma on the right lateral shoulder, inferior  Superficial basal cell carcinoma of the right upper back  Superficial basal cell carcinoma of the central lower back      Bowen's disease of the left temple-treated with Efudex cream b.i.d. for 4 weeks.    Superficial BCC, right posterior lateral arm-treated with curettage 3/7/14. BCC, right upper arm-excised 3/7/2014.   BCC on the nasal dorsum (biopsy 10/2013) status post Mohs micrographic surgery in December 2013. Review of Systems:  Skin: No new or changing moles. Past Medical History, Family History, Surgical History, Medications and Allergies reviewed. Past Medical History:   Diagnosis Date    Anxiety     BPH (benign prostatic hyperplasia) 5/31/2012    CAD (coronary artery disease)     Hyperlipidemia     LBBB (left bundle branch block) 5/31/2012    Sleep apnea 11/29/2011     Past Surgical History:   Procedure Laterality Date    ANKLE FRACTURE SURGERY         No Known Allergies  Outpatient Medications Marked as Taking for the 7/17/19 encounter (Office Visit) with Rachelle Jade MD   Medication Sig Dispense Refill    omeprazole (PRILOSEC) 20 MG delayed release capsule Take 1 capsule by mouth daily 90 capsule 3    clopidogrel (PLAVIX) 75 MG tablet Take 1 tablet by mouth daily 90 tablet 3    rosuvastatin (CRESTOR) 20 MG tablet Take 1 tablet by mouth daily 90 tablet 3    zolpidem (AMBIEN CR) 12.5 MG extended release tablet TAKE ONE TABLET BY MOUTH NIGHTLY AS NEEDED FOR SLEEP- KAMARI 90 tablet 0    tamsulosin (FLOMAX) 0.4 MG capsule TAKE 1 CAPSULE BY MOUTH EVERY DAY 90 capsule 2    Vitamin D (CHOLECALCIFEROL) 1000 UNITS CAPS capsule Take 1,000 Units by mouth daily.  aspirin 81 MG EC tablet Take 81 mg by mouth M,W,F only      Methylcellulose, Laxative, (CITRUCEL PO) Take  by mouth. Physical Examination       The following were examined and determined to be normal: Psych/Neuro, Conjunctivae/eyelids, Gums/teeth/lips, Neck, Breast/axilla/chest, Abdomen, Back, RUE, LUE, RLE and LLE. The following were examined and determined to be abnormal: Scalp/hair and Head/face. Well appearing. 1.  Crown of the scalp - 4, forehead - 7, right temple - 2, right nasal ala - 1: ill defined irreg shaped keratotic pink macules. 2.  Right medial clavicular region - stuck on appearing verrucous brown papule. 3.  Clear. Assessment and Plan     1. Actinic keratoses - several     2 cycles of liquid nitrogen applied to 14 AKs: Crown of the scalp - 4, forehead - 7, right temple - 2, right nasal ala - 1. Patient was educated regarding the potential risks of blister formation, discomfort, hypopigmentation, and scar. Wound care was discussed. 2. Seborrheic keratosis     Reassurance. 3. History of basal cell carcinomas - clear    Sun protective behaviors and self skin examinations were encouraged. Call for any new or concerning lesions. Return in about 4 months (around 11/17/2019).

## 2019-07-22 RX ORDER — TAMSULOSIN HYDROCHLORIDE 0.4 MG/1
CAPSULE ORAL
Qty: 90 CAPSULE | Refills: 2 | Status: SHIPPED | OUTPATIENT
Start: 2019-07-22 | End: 2020-06-08

## 2019-07-24 ENCOUNTER — OFFICE VISIT (OUTPATIENT)
Dept: INTERNAL MEDICINE CLINIC | Age: 74
End: 2019-07-24
Payer: MEDICARE

## 2019-07-24 VITALS
BODY MASS INDEX: 29.86 KG/M2 | SYSTOLIC BLOOD PRESSURE: 132 MMHG | WEIGHT: 197 LBS | DIASTOLIC BLOOD PRESSURE: 86 MMHG | HEIGHT: 68 IN

## 2019-07-24 DIAGNOSIS — F41.9 ANXIETY: ICD-10-CM

## 2019-07-24 DIAGNOSIS — E78.00 PURE HYPERCHOLESTEROLEMIA: ICD-10-CM

## 2019-07-24 DIAGNOSIS — F51.01 PRIMARY INSOMNIA: ICD-10-CM

## 2019-07-24 DIAGNOSIS — G47.33 OBSTRUCTIVE SLEEP APNEA SYNDROME: ICD-10-CM

## 2019-07-24 DIAGNOSIS — I25.119 CORONARY ARTERY DISEASE INVOLVING NATIVE CORONARY ARTERY WITH ANGINA PECTORIS, UNSPECIFIED WHETHER NATIVE OR TRANSPLANTED HEART (HCC): ICD-10-CM

## 2019-07-24 DIAGNOSIS — R55 NEAR SYNCOPE: Primary | ICD-10-CM

## 2019-07-24 PROCEDURE — G8417 CALC BMI ABV UP PARAM F/U: HCPCS | Performed by: INTERNAL MEDICINE

## 2019-07-24 PROCEDURE — 4040F PNEUMOC VAC/ADMIN/RCVD: CPT | Performed by: INTERNAL MEDICINE

## 2019-07-24 PROCEDURE — 3017F COLORECTAL CA SCREEN DOC REV: CPT | Performed by: INTERNAL MEDICINE

## 2019-07-24 PROCEDURE — 99213 OFFICE O/P EST LOW 20 MIN: CPT | Performed by: INTERNAL MEDICINE

## 2019-07-24 PROCEDURE — 1036F TOBACCO NON-USER: CPT | Performed by: INTERNAL MEDICINE

## 2019-07-24 PROCEDURE — G8598 ASA/ANTIPLAT THER USED: HCPCS | Performed by: INTERNAL MEDICINE

## 2019-07-24 PROCEDURE — G8427 DOCREV CUR MEDS BY ELIG CLIN: HCPCS | Performed by: INTERNAL MEDICINE

## 2019-07-24 PROCEDURE — 1123F ACP DISCUSS/DSCN MKR DOCD: CPT | Performed by: INTERNAL MEDICINE

## 2019-07-24 RX ORDER — LORAZEPAM 0.5 MG/1
TABLET ORAL
Qty: 270 TABLET | Refills: 0 | Status: SHIPPED | OUTPATIENT
Start: 2019-07-24 | End: 2019-12-02 | Stop reason: SDUPTHER

## 2019-07-24 RX ORDER — LORAZEPAM 0.5 MG/1
TABLET ORAL
Qty: 90 TABLET | Refills: 2 | Status: SHIPPED | OUTPATIENT
Start: 2019-07-24 | End: 2019-07-24 | Stop reason: SDUPTHER

## 2019-07-24 RX ORDER — ZOLPIDEM TARTRATE 12.5 MG/1
TABLET, FILM COATED, EXTENDED RELEASE ORAL
Qty: 90 TABLET | Refills: 0 | Status: SHIPPED | OUTPATIENT
Start: 2019-07-24 | End: 2019-10-25 | Stop reason: SDUPTHER

## 2019-07-24 NOTE — PROGRESS NOTES
M,W,F only   Yes Historical Provider, MD   Methylcellulose, Laxative, (CITRUCEL PO) Take  by mouth. Yes Historical Provider, MD   LORazepam (ATIVAN) 0.5 MG tablet TAKE ONE TABLET BY MOUTH EVERY 8 HOURS AS NEEDED 7/24/19 8/24/19  Marce Calderon MD       Physical Exam:  Vital Signs: /86   Ht 5' 8\" (1.727 m)   Wt 197 lb (89.4 kg)   BMI 29.95 kg/m²   General: Patient appears  non-toxic  HENT: Atraumatic, normocephalic, oral mucosa moist  Lungs:  Clear bilaterally  Heart: Regular rate and rhythm  Abdomen: Non-distended, soft, non-tender  Extremities: No edema  Neuro: Nonfocal    Medical Decision Making and Plan:  Pertinent Labs & Imaging studies reviewed. (See chart for details)  Studies fasting are pending    1. Pure hypercholesterolemia  This problem is stable check above labs continue present meds  - CBC Auto Differential; Future  - Comprehensive Metabolic Panel; Future  - Lipid Panel; Future  - TSH without Reflex; Future    2. Coronary artery disease involving native coronary artery with angina pectoris, unspecified whether native or transplanted heart ([facility] [address]  Problem appears stable however if his symptoms persist may need a stress test    3. Obstructive sleep apnea syndrome  Problem stable continue CPAP    4. Near syncope  We will check above labs  - CBC Auto Differential; Future  - Comprehensive Metabolic Panel; Future  - Lipid Panel; Future  - TSH without Reflex; Future    5. Primary insomnia  Problem is stable continue present meds no evidence of addiction or dependency  - zolpidem (AMBIEN CR) 12.5 MG extended release tablet; TAKE ONE TABLET BY MOUTH NIGHTLY AS NEEDED FOR SLEEP- KAMARI  Dispense: 90 tablet; Refill: 0    6.  Anxiety  Problem is stable

## 2019-07-25 DIAGNOSIS — R55 NEAR SYNCOPE: ICD-10-CM

## 2019-07-25 DIAGNOSIS — E78.00 PURE HYPERCHOLESTEROLEMIA: ICD-10-CM

## 2019-07-25 LAB
A/G RATIO: 1.8 (ref 1.1–2.2)
ALBUMIN SERPL-MCNC: 4.5 G/DL (ref 3.4–5)
ALP BLD-CCNC: 52 U/L (ref 40–129)
ALT SERPL-CCNC: 18 U/L (ref 10–40)
ANION GAP SERPL CALCULATED.3IONS-SCNC: 13 MMOL/L (ref 3–16)
AST SERPL-CCNC: 20 U/L (ref 15–37)
BASOPHILS ABSOLUTE: 0 K/UL (ref 0–0.2)
BASOPHILS RELATIVE PERCENT: 0.6 %
BILIRUB SERPL-MCNC: 0.6 MG/DL (ref 0–1)
BUN BLDV-MCNC: 14 MG/DL (ref 7–20)
CALCIUM SERPL-MCNC: 9.3 MG/DL (ref 8.3–10.6)
CHLORIDE BLD-SCNC: 105 MMOL/L (ref 99–110)
CHOLESTEROL, TOTAL: 141 MG/DL (ref 0–199)
CO2: 23 MMOL/L (ref 21–32)
CREAT SERPL-MCNC: 0.9 MG/DL (ref 0.8–1.3)
EOSINOPHILS ABSOLUTE: 0.2 K/UL (ref 0–0.6)
EOSINOPHILS RELATIVE PERCENT: 3.5 %
GFR AFRICAN AMERICAN: >60
GFR NON-AFRICAN AMERICAN: >60
GLOBULIN: 2.5 G/DL
GLUCOSE BLD-MCNC: 123 MG/DL (ref 70–99)
HCT VFR BLD CALC: 44.3 % (ref 40.5–52.5)
HDLC SERPL-MCNC: 49 MG/DL (ref 40–60)
HEMOGLOBIN: 14.8 G/DL (ref 13.5–17.5)
LDL CHOLESTEROL CALCULATED: 66 MG/DL
LYMPHOCYTES ABSOLUTE: 1.4 K/UL (ref 1–5.1)
LYMPHOCYTES RELATIVE PERCENT: 24.3 %
MCH RBC QN AUTO: 31 PG (ref 26–34)
MCHC RBC AUTO-ENTMCNC: 33.4 G/DL (ref 31–36)
MCV RBC AUTO: 92.8 FL (ref 80–100)
MONOCYTES ABSOLUTE: 0.6 K/UL (ref 0–1.3)
MONOCYTES RELATIVE PERCENT: 10 %
NEUTROPHILS ABSOLUTE: 3.5 K/UL (ref 1.7–7.7)
NEUTROPHILS RELATIVE PERCENT: 61.6 %
PDW BLD-RTO: 12.6 % (ref 12.4–15.4)
PLATELET # BLD: 224 K/UL (ref 135–450)
PMV BLD AUTO: 8 FL (ref 5–10.5)
POTASSIUM SERPL-SCNC: 4.2 MMOL/L (ref 3.5–5.1)
RBC # BLD: 4.77 M/UL (ref 4.2–5.9)
SODIUM BLD-SCNC: 141 MMOL/L (ref 136–145)
TOTAL PROTEIN: 7 G/DL (ref 6.4–8.2)
TRIGL SERPL-MCNC: 130 MG/DL (ref 0–150)
TSH SERPL DL<=0.05 MIU/L-ACNC: 5.57 UIU/ML (ref 0.27–4.2)
VLDLC SERPL CALC-MCNC: 26 MG/DL
WBC # BLD: 5.6 K/UL (ref 4–11)

## 2019-07-29 ENCOUNTER — HOSPITAL ENCOUNTER (OUTPATIENT)
Dept: CARDIAC REHAB | Age: 74
Discharge: HOME | End: 2019-07-29
Payer: MEDICARE

## 2019-08-26 ENCOUNTER — HOSPITAL ENCOUNTER (OUTPATIENT)
Dept: CARDIAC REHAB | Age: 74
Discharge: HOME | End: 2019-08-26
Payer: MEDICARE

## 2019-08-26 PROCEDURE — 9900000038 HC CARDIAC REHAB PHASE 3 - MONTHLY

## 2019-09-09 PROCEDURE — 9900000038 HC CARDIAC REHAB PHASE 3 - MONTHLY

## 2019-09-26 ENCOUNTER — TELEPHONE (OUTPATIENT)
Dept: DERMATOLOGY | Age: 74
End: 2019-09-26

## 2019-09-30 ENCOUNTER — OFFICE VISIT (OUTPATIENT)
Dept: DERMATOLOGY | Age: 74
End: 2019-09-30
Payer: MEDICARE

## 2019-09-30 ENCOUNTER — HOSPITAL ENCOUNTER (OUTPATIENT)
Dept: CARDIAC REHAB | Age: 74
Discharge: HOME | End: 2019-09-30
Payer: MEDICARE

## 2019-09-30 DIAGNOSIS — L57.0 ACTINIC KERATOSIS: Primary | ICD-10-CM

## 2019-09-30 PROCEDURE — 17000 DESTRUCT PREMALG LESION: CPT | Performed by: DERMATOLOGY

## 2019-09-30 PROCEDURE — 17003 DESTRUCT PREMALG LES 2-14: CPT | Performed by: DERMATOLOGY

## 2019-09-30 PROCEDURE — G8427 DOCREV CUR MEDS BY ELIG CLIN: HCPCS | Performed by: DERMATOLOGY

## 2019-09-30 PROCEDURE — 4040F PNEUMOC VAC/ADMIN/RCVD: CPT | Performed by: DERMATOLOGY

## 2019-09-30 PROCEDURE — 99213 OFFICE O/P EST LOW 20 MIN: CPT | Performed by: DERMATOLOGY

## 2019-09-30 PROCEDURE — 3017F COLORECTAL CA SCREEN DOC REV: CPT | Performed by: DERMATOLOGY

## 2019-09-30 PROCEDURE — 1123F ACP DISCUSS/DSCN MKR DOCD: CPT | Performed by: DERMATOLOGY

## 2019-09-30 PROCEDURE — 1036F TOBACCO NON-USER: CPT | Performed by: DERMATOLOGY

## 2019-09-30 PROCEDURE — G8598 ASA/ANTIPLAT THER USED: HCPCS | Performed by: DERMATOLOGY

## 2019-09-30 PROCEDURE — G8417 CALC BMI ABV UP PARAM F/U: HCPCS | Performed by: DERMATOLOGY

## 2019-10-02 PROCEDURE — 9900000038 HC CARDIAC REHAB PHASE 3 - MONTHLY

## 2019-10-25 ENCOUNTER — TELEPHONE (OUTPATIENT)
Dept: INTERNAL MEDICINE CLINIC | Age: 74
End: 2019-10-25

## 2019-10-25 DIAGNOSIS — F51.01 PRIMARY INSOMNIA: ICD-10-CM

## 2019-10-25 RX ORDER — ZOLPIDEM TARTRATE 12.5 MG/1
TABLET, FILM COATED, EXTENDED RELEASE ORAL
Qty: 90 TABLET | Refills: 0 | OUTPATIENT
Start: 2019-10-25 | End: 2019-10-31 | Stop reason: SDUPTHER

## 2019-10-28 ENCOUNTER — TELEPHONE (OUTPATIENT)
Dept: FAMILY MEDICINE CLINIC | Age: 74
End: 2019-10-28

## 2019-10-28 ENCOUNTER — HOSPITAL ENCOUNTER (OUTPATIENT)
Dept: CARDIAC REHAB | Age: 74
Discharge: HOME | End: 2019-10-28
Payer: MEDICARE

## 2019-10-31 ENCOUNTER — OFFICE VISIT (OUTPATIENT)
Dept: INTERNAL MEDICINE CLINIC | Age: 74
End: 2019-10-31
Payer: MEDICARE

## 2019-10-31 VITALS
BODY MASS INDEX: 30.77 KG/M2 | WEIGHT: 203 LBS | SYSTOLIC BLOOD PRESSURE: 136 MMHG | DIASTOLIC BLOOD PRESSURE: 78 MMHG | HEIGHT: 68 IN

## 2019-10-31 DIAGNOSIS — I25.119 CORONARY ARTERY DISEASE INVOLVING NATIVE CORONARY ARTERY WITH ANGINA PECTORIS, UNSPECIFIED WHETHER NATIVE OR TRANSPLANTED HEART (HCC): Primary | ICD-10-CM

## 2019-10-31 DIAGNOSIS — F51.01 PRIMARY INSOMNIA: ICD-10-CM

## 2019-10-31 PROCEDURE — 3017F COLORECTAL CA SCREEN DOC REV: CPT | Performed by: INTERNAL MEDICINE

## 2019-10-31 PROCEDURE — G8484 FLU IMMUNIZE NO ADMIN: HCPCS | Performed by: INTERNAL MEDICINE

## 2019-10-31 PROCEDURE — 99213 OFFICE O/P EST LOW 20 MIN: CPT | Performed by: INTERNAL MEDICINE

## 2019-10-31 PROCEDURE — G8427 DOCREV CUR MEDS BY ELIG CLIN: HCPCS | Performed by: INTERNAL MEDICINE

## 2019-10-31 PROCEDURE — 1036F TOBACCO NON-USER: CPT | Performed by: INTERNAL MEDICINE

## 2019-10-31 PROCEDURE — G8417 CALC BMI ABV UP PARAM F/U: HCPCS | Performed by: INTERNAL MEDICINE

## 2019-10-31 PROCEDURE — 4040F PNEUMOC VAC/ADMIN/RCVD: CPT | Performed by: INTERNAL MEDICINE

## 2019-10-31 PROCEDURE — 1123F ACP DISCUSS/DSCN MKR DOCD: CPT | Performed by: INTERNAL MEDICINE

## 2019-10-31 PROCEDURE — G8598 ASA/ANTIPLAT THER USED: HCPCS | Performed by: INTERNAL MEDICINE

## 2019-10-31 RX ORDER — ZOLPIDEM TARTRATE 12.5 MG/1
TABLET, FILM COATED, EXTENDED RELEASE ORAL
Qty: 90 TABLET | Refills: 0 | Status: SHIPPED | OUTPATIENT
Start: 2019-10-31 | End: 2020-01-31 | Stop reason: SDUPTHER

## 2019-11-04 PROCEDURE — 9900000038 HC CARDIAC REHAB PHASE 3 - MONTHLY

## 2019-11-11 ENCOUNTER — OFFICE VISIT (OUTPATIENT)
Dept: DERMATOLOGY | Age: 74
End: 2019-11-11
Payer: MEDICARE

## 2019-11-11 DIAGNOSIS — L57.0 ACTINIC KERATOSIS: Primary | ICD-10-CM

## 2019-11-11 DIAGNOSIS — Z85.828 HISTORY OF BASAL CELL CARCINOMA (BCC): ICD-10-CM

## 2019-11-11 PROCEDURE — 4040F PNEUMOC VAC/ADMIN/RCVD: CPT | Performed by: DERMATOLOGY

## 2019-11-11 PROCEDURE — G8598 ASA/ANTIPLAT THER USED: HCPCS | Performed by: DERMATOLOGY

## 2019-11-11 PROCEDURE — G8484 FLU IMMUNIZE NO ADMIN: HCPCS | Performed by: DERMATOLOGY

## 2019-11-11 PROCEDURE — G8417 CALC BMI ABV UP PARAM F/U: HCPCS | Performed by: DERMATOLOGY

## 2019-11-11 PROCEDURE — G8427 DOCREV CUR MEDS BY ELIG CLIN: HCPCS | Performed by: DERMATOLOGY

## 2019-11-11 PROCEDURE — 1036F TOBACCO NON-USER: CPT | Performed by: DERMATOLOGY

## 2019-11-11 PROCEDURE — 3017F COLORECTAL CA SCREEN DOC REV: CPT | Performed by: DERMATOLOGY

## 2019-11-11 PROCEDURE — 17000 DESTRUCT PREMALG LESION: CPT | Performed by: DERMATOLOGY

## 2019-11-11 PROCEDURE — 99213 OFFICE O/P EST LOW 20 MIN: CPT | Performed by: DERMATOLOGY

## 2019-11-11 PROCEDURE — 1123F ACP DISCUSS/DSCN MKR DOCD: CPT | Performed by: DERMATOLOGY

## 2019-11-25 ENCOUNTER — HOSPITAL ENCOUNTER (OUTPATIENT)
Dept: CARDIAC REHAB | Age: 74
Discharge: HOME | End: 2019-11-25
Payer: MEDICARE

## 2019-12-02 ENCOUNTER — TELEPHONE (OUTPATIENT)
Dept: INTERNAL MEDICINE CLINIC | Age: 74
End: 2019-12-02

## 2019-12-02 DIAGNOSIS — F41.9 ANXIETY: ICD-10-CM

## 2019-12-02 RX ORDER — LORAZEPAM 0.5 MG/1
TABLET ORAL
Qty: 270 TABLET | Refills: 0 | OUTPATIENT
Start: 2019-12-02 | End: 2019-12-04 | Stop reason: SDUPTHER

## 2019-12-04 DIAGNOSIS — F41.9 ANXIETY: ICD-10-CM

## 2019-12-04 PROCEDURE — 9900000038 HC CARDIAC REHAB PHASE 3 - MONTHLY

## 2019-12-04 RX ORDER — LORAZEPAM 0.5 MG/1
TABLET ORAL
Qty: 270 TABLET | Refills: 0 | Status: SHIPPED | OUTPATIENT
Start: 2019-12-04 | End: 2020-03-23 | Stop reason: SDUPTHER

## 2019-12-30 ENCOUNTER — HOSPITAL ENCOUNTER (OUTPATIENT)
Dept: CARDIAC REHAB | Age: 74
Discharge: HOME | End: 2019-12-30
Payer: MEDICARE

## 2020-01-31 ENCOUNTER — OFFICE VISIT (OUTPATIENT)
Dept: INTERNAL MEDICINE CLINIC | Age: 75
End: 2020-01-31
Payer: MEDICARE

## 2020-01-31 ENCOUNTER — TELEPHONE (OUTPATIENT)
Dept: INTERNAL MEDICINE CLINIC | Age: 75
End: 2020-01-31

## 2020-01-31 VITALS
HEIGHT: 68 IN | BODY MASS INDEX: 29.16 KG/M2 | WEIGHT: 192.4 LBS | DIASTOLIC BLOOD PRESSURE: 70 MMHG | SYSTOLIC BLOOD PRESSURE: 130 MMHG

## 2020-01-31 PROCEDURE — 3017F COLORECTAL CA SCREEN DOC REV: CPT | Performed by: INTERNAL MEDICINE

## 2020-01-31 PROCEDURE — 1123F ACP DISCUSS/DSCN MKR DOCD: CPT | Performed by: INTERNAL MEDICINE

## 2020-01-31 PROCEDURE — 1036F TOBACCO NON-USER: CPT | Performed by: INTERNAL MEDICINE

## 2020-01-31 PROCEDURE — 4040F PNEUMOC VAC/ADMIN/RCVD: CPT | Performed by: INTERNAL MEDICINE

## 2020-01-31 PROCEDURE — 99213 OFFICE O/P EST LOW 20 MIN: CPT | Performed by: INTERNAL MEDICINE

## 2020-01-31 PROCEDURE — G8417 CALC BMI ABV UP PARAM F/U: HCPCS | Performed by: INTERNAL MEDICINE

## 2020-01-31 PROCEDURE — G8484 FLU IMMUNIZE NO ADMIN: HCPCS | Performed by: INTERNAL MEDICINE

## 2020-01-31 PROCEDURE — G8427 DOCREV CUR MEDS BY ELIG CLIN: HCPCS | Performed by: INTERNAL MEDICINE

## 2020-01-31 RX ORDER — ZOLPIDEM TARTRATE 12.5 MG/1
TABLET, FILM COATED, EXTENDED RELEASE ORAL
Qty: 90 TABLET | Refills: 0 | Status: SHIPPED | OUTPATIENT
Start: 2020-01-31 | End: 2020-04-29 | Stop reason: SDUPTHER

## 2020-01-31 RX ORDER — CIPROFLOXACIN 500 MG/1
500 TABLET, FILM COATED ORAL 2 TIMES DAILY
Qty: 20 TABLET | Refills: 0 | Status: SHIPPED | OUTPATIENT
Start: 2020-01-31 | End: 2020-02-10

## 2020-01-31 ASSESSMENT — PATIENT HEALTH QUESTIONNAIRE - PHQ9
SUM OF ALL RESPONSES TO PHQ9 QUESTIONS 1 & 2: 0
2. FEELING DOWN, DEPRESSED OR HOPELESS: 0
SUM OF ALL RESPONSES TO PHQ QUESTIONS 1-9: 0
SUM OF ALL RESPONSES TO PHQ QUESTIONS 1-9: 0
1. LITTLE INTEREST OR PLEASURE IN DOING THINGS: 0

## 2020-01-31 NOTE — TELEPHONE ENCOUNTER
Call returned. Message left for patient to call back. What medication are you referring to or what was it for?

## 2020-02-12 ENCOUNTER — OFFICE VISIT (OUTPATIENT)
Dept: DERMATOLOGY | Age: 75
End: 2020-02-12
Payer: MEDICARE

## 2020-02-12 PROCEDURE — 1123F ACP DISCUSS/DSCN MKR DOCD: CPT | Performed by: DERMATOLOGY

## 2020-02-12 PROCEDURE — G8417 CALC BMI ABV UP PARAM F/U: HCPCS | Performed by: DERMATOLOGY

## 2020-02-12 PROCEDURE — 4040F PNEUMOC VAC/ADMIN/RCVD: CPT | Performed by: DERMATOLOGY

## 2020-02-12 PROCEDURE — 3017F COLORECTAL CA SCREEN DOC REV: CPT | Performed by: DERMATOLOGY

## 2020-02-12 PROCEDURE — 11102 TANGNTL BX SKIN SINGLE LES: CPT | Performed by: DERMATOLOGY

## 2020-02-12 PROCEDURE — G8484 FLU IMMUNIZE NO ADMIN: HCPCS | Performed by: DERMATOLOGY

## 2020-02-12 PROCEDURE — G8427 DOCREV CUR MEDS BY ELIG CLIN: HCPCS | Performed by: DERMATOLOGY

## 2020-02-12 PROCEDURE — 99213 OFFICE O/P EST LOW 20 MIN: CPT | Performed by: DERMATOLOGY

## 2020-02-12 PROCEDURE — 1036F TOBACCO NON-USER: CPT | Performed by: DERMATOLOGY

## 2020-02-12 NOTE — PROGRESS NOTES
Counts include 234 beds at the [facility]  Dolly Krabbe, MD  [phone]      Crispin Cross  1945    69 y.o. male     Date of Visit: 2/12/2020    Chief Complaint: skin lesions    History of Present Illness:    1. F/u for a hx of facial AKs -he complains of persistent scaly lesions on the upper aspect of the forehead. He has not yet used Efudex. 2.  Unknown duration of an asymptomatic lesion on the right mid vertex scalp. 3.  He has a history of multiple nonmelanoma skin cancers-denies any signs of recurrence. Small nodular BCCs on the right anterior upper arm and right mid arm-treated with curettage at the time of biopsy on 2/26/2019. Nodular BCC on the left upper chest history with curettage at the time of biopsy in October 2018. Superficial BCC of the right supraclavicular region-treated with curettage at time of biopsy on 1/3/2018.     Nodular BCC on the right thigh - excised on 6/23/17.    Superficial BCC on the right central upper back - treated with curettage on 3/28/17. Nodular BCC on the left central lower back - treated with curettage on 3/28/17. Nodular BCC on the right mid thigh-treated with curettage on 10/7/2016. Superficial basal cell carcinoma left upper chest-treated with curettage on 9/21/2016. Nodular BCC of the right mid central back-treated with curettage on 9/21/2016.     Superficial BCC of the left central lower back-treated with curettage on 3/3/2016.     4 BCCs treated with ED&C on 9/2015.    Nodular BCC on the right lateral shoulder, superior  Nodular basal cell carcinoma on the right lateral shoulder, inferior  Superficial basal cell carcinoma of the right upper back  Superficial basal cell carcinoma of the central lower back      Bowen's disease of the left temple-treated with Efudex cream b.i.d. for 4 weeks.    Superficial BCC, right posterior lateral arm-treated with curettage 3/7/14. BCC, right upper arm-excised 3/7/2014.   BCC on the nasal dorsum (biopsy 10/2013) status post Mohs micrographic surgery in December 2013. Review of Systems:  Skin: No new or changing moles. Past Medical History, Family History, Surgical History, Medications and Allergies reviewed. Past Medical History:   Diagnosis Date    Anxiety     BPH (benign prostatic hyperplasia) 5/31/2012    CAD (coronary artery disease)     Hyperlipidemia     LBBB (left bundle branch block) 5/31/2012    Primary insomnia 10/31/2019    Sleep apnea 11/29/2011     Past Surgical History:   Procedure Laterality Date    ANKLE FRACTURE SURGERY         No Known Allergies  Outpatient Medications Marked as Taking for the 2/12/20 encounter (Office Visit) with Rahel Villavicencio MD   Medication Sig Dispense Refill    zolpidem (AMBIEN CR) 12.5 MG extended release tablet TAKE ONE TABLET BY MOUTH NIGHTLY AS NEEDED FOR SLEEP- KAMARI 90 tablet 0    tamsulosin (FLOMAX) 0.4 MG capsule TAKE 1 CAPSULE BY MOUTH EVERY DAY 90 capsule 2    omeprazole (PRILOSEC) 20 MG delayed release capsule Take 1 capsule by mouth daily 90 capsule 3    clopidogrel (PLAVIX) 75 MG tablet Take 1 tablet by mouth daily 90 tablet 3    rosuvastatin (CRESTOR) 20 MG tablet Take 1 tablet by mouth daily 90 tablet 3    JUBLIA 10 % SOLN APPLY TO THE RIGHT BIG TONAIL AND 4TH TOENAIL ONCE A DAY AFTER BATHING 4 mL 0    Vitamin D (CHOLECALCIFEROL) 1000 UNITS CAPS capsule Take 1,000 Units by mouth daily.  aspirin 81 MG EC tablet Take 81 mg by mouth M,W,F only      Methylcellulose, Laxative, (CITRUCEL PO) Take  by mouth. Physical Examination       The following were examined and determined to be normal: Psych/Neuro, Conjunctivae/eyelids, Gums/teeth/lips, Neck, Breast/axilla/chest, Abdomen, Back, RUE, LUE, RLE, LLE and Nails/digits. The following were examined and determined to be abnormal: Scalp/hair and Head/face. Well-appearing. 1.  Upper aspect of the forehead with multiple skin colored to pink scaly macules and patches.     2.  Right mid vertex scalp with a 5 mm telangiectatic pearly macule with few brown dots. 3.  Clear. Assessment and Plan     1. Actinic keratosis-multiple on the forehead    Efudex cream twice daily for 2 weeks. 2. Neoplasm of uncertain behavior of skin, right mid vertex scalp-likely BCC    Discussed possible diagnosis; patient agreeable to biopsy (verbal consent obtained). The area(s) to be biopsied were marked with a surgical pen. Alcohol was used to cleanse the site. Local anesthesia was acheived with 1% lidocaine with epinephrine. Shave biopsy was performed using a razor blade. Hemostasis was achieved with aluminum chloride. The wound(s) were dressed with petrolatum and covered with a bandage. Wound care instructions were reviewed. 1 Specimen (s) sent to pathology. The specimen bottles were appropriately labeled. We also reviewed the risks of bleeding, scar, and infection. 3. History of nonmelanoma skin cancers - clear    Sun protective behaviors and self skin examinations were encouraged. Call for any new or concerning lesions. Return in about 3 months (around 5/12/2020).

## 2020-02-14 LAB — DERMATOLOGY PATHOLOGY REPORT: ABNORMAL

## 2020-03-11 ENCOUNTER — PROCEDURE VISIT (OUTPATIENT)
Dept: SURGERY | Age: 75
End: 2020-03-11
Payer: MEDICARE

## 2020-03-11 VITALS
WEIGHT: 194 LBS | TEMPERATURE: 97.9 F | SYSTOLIC BLOOD PRESSURE: 145 MMHG | BODY MASS INDEX: 29.5 KG/M2 | HEART RATE: 89 BPM | OXYGEN SATURATION: 95 % | DIASTOLIC BLOOD PRESSURE: 89 MMHG

## 2020-03-11 PROCEDURE — 17311 MOHS 1 STAGE H/N/HF/G: CPT | Performed by: DERMATOLOGY

## 2020-03-11 PROCEDURE — 12032 INTMD RPR S/A/T/EXT 2.6-7.5: CPT | Performed by: DERMATOLOGY

## 2020-03-11 NOTE — PATIENT INSTRUCTIONS
[facility] Office Hours:    Monday-Thursday  7:30 AM-4:30 PM    Friday  9:00 AM-1:00 PM          POST-OPERATIVE CARE FOR STICHES              Bandage change after 48 hours    CARING FOR YOUR SURGICAL SITE  The bandage should remain on and completley dry for 48 hours. Do NOT get the bandage wet. 1. After the first 48 hours, gently remove the remaining part of the bandage. It can be helpful to moisten the bandage edges in the shower. Steri strips may still be on the wound. It is ok, they will fall off slowly with the daily bandage changes. 2. Gently clean the wound daily with mild soap and water. Try to clean off crust and debris. 3. Dry (pat) the area with a clean Q-tip or gauze. 4. Apply a layer of Vaseline/ Aquaphor (or Bacitracin if your doctor recommends) to the wound area only. 5. Cut a piece of Telfa (or any non-stick dressing) to fit just over the wound and secure it with paper tape. If the wound is small you may use a Band- Aid. Keep area covered for a total of 3 week(s). If the dressing comes off or if you have questions, or concerns about the dressing, please call the office for instructions! POST OPERATIVE INSTRUCTIONS    1. Activity: Do not lift anything heavier than a gallon of milk for 1 week. Also, avoid strenuous activity such as running, power walking or contact sports. 2. Eating and drinking: Do not drink alcohol for 48 hours after your procedure. Alcohol increases the chances of bleeding. 3. Medicines   -If you have discomfort, take Acetaminophen (Tylenol or Extra Strength Tylenol). Follow the instructions and warning on the bottle. -If your doctor has prescribed you an Aspirin daily, please keep taking it. Do not take extra Aspirin or medicines containing Aspirin (such as Emi-Jefferson and Excedrin) for 48 hours  after your procedure. Bleeding: If bleeding occurs, DO NOT remove the bandage.  Put firm pressure on the area with gauze for 20 minutes without

## 2020-03-11 NOTE — PROGRESS NOTES
PRE-PROCEDURE SCREENING    Pacemaker/ICD: No  Difficulty with numbing in the past: No  Local Anesthesia Reaction/passing out: No  Latex or adhesive allergy:  No  Bleeding/Clotting Disorders: No  Anticoagulant Therapy: Yes, Plavix & Aspirin 81mg (Stent 2006)   Joint prosthesis: No  Artificial Heart Valve: No  Stroke or Seizures: No  Organ Transplant or Lymphoma: No  Immunosuppression: No  Respiratory Problems: No

## 2020-03-12 ENCOUNTER — TELEPHONE (OUTPATIENT)
Dept: SURGERY | Age: 75
End: 2020-03-12

## 2020-03-23 RX ORDER — LORAZEPAM 0.5 MG/1
TABLET ORAL
Qty: 270 TABLET | Refills: 0 | OUTPATIENT
Start: 2020-03-23 | End: 2020-06-26 | Stop reason: SDUPTHER

## 2020-03-31 ENCOUNTER — NURSE ONLY (OUTPATIENT)
Dept: SURGERY | Age: 75
End: 2020-03-31

## 2020-03-31 PROBLEM — Z48.02 VISIT FOR SUTURE REMOVAL: Status: ACTIVE | Noted: 2020-03-31

## 2020-03-31 NOTE — PROGRESS NOTES
S:  The patient is here for suture removal s/p Mohs surgery on the R mid vertex scalp and Intermediate layered closure repair, 3 weeks ago. The site appears well-healed without signs of infection (redness, pain or discharge). The sutures were removed. Wound care and activity instructions given. The patient was scheduled for f/u with General Dermatology per their instructions.

## 2020-04-29 ENCOUNTER — VIRTUAL VISIT (OUTPATIENT)
Dept: INTERNAL MEDICINE CLINIC | Age: 75
End: 2020-04-29
Payer: MEDICARE

## 2020-04-29 VITALS — BODY MASS INDEX: 29.5 KG/M2 | HEIGHT: 68 IN

## 2020-04-29 PROCEDURE — 4040F PNEUMOC VAC/ADMIN/RCVD: CPT | Performed by: INTERNAL MEDICINE

## 2020-04-29 PROCEDURE — 3017F COLORECTAL CA SCREEN DOC REV: CPT | Performed by: INTERNAL MEDICINE

## 2020-04-29 PROCEDURE — G8427 DOCREV CUR MEDS BY ELIG CLIN: HCPCS | Performed by: INTERNAL MEDICINE

## 2020-04-29 PROCEDURE — 1123F ACP DISCUSS/DSCN MKR DOCD: CPT | Performed by: INTERNAL MEDICINE

## 2020-04-29 PROCEDURE — 99213 OFFICE O/P EST LOW 20 MIN: CPT | Performed by: INTERNAL MEDICINE

## 2020-04-29 RX ORDER — ZOLPIDEM TARTRATE 12.5 MG/1
TABLET, FILM COATED, EXTENDED RELEASE ORAL
Qty: 90 TABLET | Refills: 0 | Status: SHIPPED | OUTPATIENT
Start: 2020-04-29 | End: 2020-07-28 | Stop reason: SDUPTHER

## 2020-04-29 NOTE — PROGRESS NOTES
2020    TELEHEALTH EVALUATION -- Audio/Visual (During IPMGD-86 public health emergency)    HPI: Follow-up insomnia    Mira Lal (:  1945) has requested an audio/video evaluation for the following concern(s):    Complains of persistent insomnia he has been out of his Ambien for 3 days and has not slept well he otherwise is been doing fine no chest pain shortness of breath or any other problems    Review of Systems no fevers chills shortness of breath    Prior to Visit Medications    Medication Sig Taking? Authorizing Provider   zolpidem (AMBIEN CR) 12.5 MG extended release tablet TAKE ONE TABLET BY MOUTH NIGHTLY AS NEEDED FOR SLEEP- KAMARI Yes Desmond Ortega MD   tamsulosin (FLOMAX) 0.4 MG capsule TAKE 1 CAPSULE BY MOUTH EVERY DAY Yes Desmond Ortega MD   omeprazole (PRILOSEC) 20 MG delayed release capsule Take 1 capsule by mouth daily Yes Desmond Ortega MD   clopidogrel (PLAVIX) 75 MG tablet Take 1 tablet by mouth daily Yes Desmond Ortega MD   rosuvastatin (CRESTOR) 20 MG tablet Take 1 tablet by mouth daily Yes Desmond Ortega MD   JUBLIA 10 % SOLN APPLY TO THE RIGHT BIG TONAIL AND 4TH TOENAIL ONCE A DAY AFTER BATHING Yes [place] MD Shaniqua   fluorouracil (EFUDEX) 5 % cream Apply to the forehead, temples and sides of the cheeks 2 times daily for 14 days. Yes [place] MD Shaniqua   Vitamin D (CHOLECALCIFEROL) 1000 UNITS CAPS capsule Take 1,000 Units by mouth daily. Yes Historical Provider, MD   aspirin 81 MG EC tablet Take 81 mg by mouth M,W,F only Yes Historical Provider, MD       Social History     Tobacco Use    Smoking status: Never Smoker    Smokeless tobacco: Never Used   Substance Use Topics    Alcohol use:  Yes     Alcohol/week: 1.7 standard drinks     Types: 2 Standard drinks or equivalent per week    Drug use: Not on file            PHYSICAL EXAMINATION:  [ INSTRUCTIONS:  \"[x]\" Indicates a positive item  \"[]\" Indicates a negative item  -- DELETE ALL ITEMS NOT EXAMINED]  Vital Signs: (As obtained by authority and the [employer] and [employer] Act, this Virtual Visit was conducted with patient's (and/or legal guardian's) consent, to reduce the patient's risk of exposure to COVID-19 and provide necessary medical care. The patient (and/or legal guardian) has also been advised to contact this office for worsening conditions or problems, and seek emergency medical treatment and/or call 911 if deemed necessary. Patient identification was verified at the start of the visit: Yes    Total time spent on this encounter: Not billed by time    Services were provided through a video synchronous discussion virtually to substitute for in-person clinic visit. Patient and provider were located at their individual homes. --Michel Saldana MD on 4/29/2020 at 11:12 AM    An electronic signature was used to authenticate this note.

## 2020-06-08 RX ORDER — TAMSULOSIN HYDROCHLORIDE 0.4 MG/1
CAPSULE ORAL
Qty: 90 CAPSULE | Refills: 2 | Status: SHIPPED | OUTPATIENT
Start: 2020-06-08 | End: 2021-01-12

## 2020-06-26 ENCOUNTER — TELEPHONE (OUTPATIENT)
Dept: INTERNAL MEDICINE CLINIC | Age: 75
End: 2020-06-26

## 2020-06-30 ENCOUNTER — OFFICE VISIT (OUTPATIENT)
Dept: PRIMARY CARE CLINIC | Age: 75
End: 2020-06-30
Payer: MEDICARE

## 2020-06-30 PROCEDURE — G8417 CALC BMI ABV UP PARAM F/U: HCPCS | Performed by: NURSE PRACTITIONER

## 2020-06-30 PROCEDURE — 4040F PNEUMOC VAC/ADMIN/RCVD: CPT | Performed by: NURSE PRACTITIONER

## 2020-06-30 PROCEDURE — G8428 CUR MEDS NOT DOCUMENT: HCPCS | Performed by: NURSE PRACTITIONER

## 2020-06-30 PROCEDURE — 1123F ACP DISCUSS/DSCN MKR DOCD: CPT | Performed by: NURSE PRACTITIONER

## 2020-06-30 PROCEDURE — 1036F TOBACCO NON-USER: CPT | Performed by: NURSE PRACTITIONER

## 2020-06-30 PROCEDURE — 99213 OFFICE O/P EST LOW 20 MIN: CPT | Performed by: NURSE PRACTITIONER

## 2020-06-30 PROCEDURE — 3017F COLORECTAL CA SCREEN DOC REV: CPT | Performed by: NURSE PRACTITIONER

## 2020-06-30 RX ORDER — LORAZEPAM 0.5 MG/1
TABLET ORAL
Qty: 270 TABLET | Refills: 0 | Status: SHIPPED | OUTPATIENT
Start: 2020-06-30 | End: 2020-10-26 | Stop reason: SDUPTHER

## 2020-06-30 NOTE — PROGRESS NOTES
FLU/COVID-19 CLINIC EVALUATION  HPI: Patient is in office today with concern for a known close exposure to COVID-19 Virus. The patient is requesting screening evaluation and COVID-19 testing. Symptom duration, days:  [] 1   [] 2    []3   [] 4    []5    []6   [] 7    []8    []9   [] 10    []11 []  12   [] 13    []14 or greater    There were no vitals filed for this visit. ROS:  All systems reviewed and are negative unless marked. Constitutional: []Fevers []Chills   HENT: []Nasal Congestion []Loss of taste/smell []Sore throat   Respiratory: []Cough []Chest Congestion []Chest Congestion []Feeling short of breath  Cardiovascular: []Chest pain/heaviness  Gastrointestinal: []Nausea []Vomiting []Diarrhea  Musculoskeletal: []Muscle aches [] Fatigue   Neurological: []Headaches    OTHER SYMPTOMS:      Past Medical History:   Diagnosis Date    Anxiety     BPH (benign prostatic hyperplasia) 5/31/2012    CAD (coronary artery disease)     Hyperlipidemia     LBBB (left bundle branch block) 5/31/2012    Primary insomnia 10/31/2019    Sleep apnea 11/29/2011     Social History     Socioeconomic History    Marital status:      Spouse name: Not on file    Number of children: Not on file    Years of education: Not on file    Highest education level: Not on file   Occupational History    Not on file   Social Needs    Financial resource strain: Not on file    Food insecurity     Worry: Not on file     Inability: Not on file    Transportation needs     Medical: Not on file     Non-medical: Not on file   Tobacco Use    Smoking status: Never Smoker    Smokeless tobacco: Never Used   Substance and Sexual Activity    Alcohol use:  Yes     Alcohol/week: 1.7 standard drinks     Types: 2 Standard drinks or equivalent per week    Drug use: Not on file    Sexual activity: Not on file   Lifestyle    Physical activity     Days per week: Not on file     Minutes per session: Not on file    Stress: Not on file Relationships    Social connections     Talks on phone: Not on file     Gets together: Not on file     Attends Yazdanism service: Not on file     Active member of club or organization: Not on file     Attends meetings of clubs or organizations: Not on file     Relationship status: Not on file    Intimate partner violence     Fear of current or ex partner: Not on file     Emotionally abused: Not on file     Physically abused: Not on file     Forced sexual activity: Not on file   Other Topics Concern    Not on file   Social History Narrative    Not on file     Family History   Problem Relation Age of Onset    Cancer Mother     Heart Disease Father     Thyroid Disease Brother        No visits with results within 1 Month(s) from this visit. Latest known visit with results is:   Office Visit on 02/12/2020   Component Date Value Ref Range Status    Dermatology Pathology Report 02/12/2020 SEE COMMENTS*  Final        RISK FACTORS:  []Pregnancy   []Diabetes  []Heart disease  []Asthma  []COPD/Other chronic lung diseases  []Active Cancer/Chemotherapy   []Taking oral steroids  [x]Close contact with a lab confirmed COVID-19 patient within 14 days of symptom onset  []Health Care Worker Exposure no symptoms  []Health Care Worker Exposure symptomatic    Assessment  Physical Exam    Constitutional:       Appearance: Normal appearance. HENT:      Head: Normocephalic and atraumatic. Mouth/Throat:      Mouth: Mucous membranes are moist.   Neck:      Musculoskeletal: Normal range of motion. Cardiovascular:     Skin pink and warm     Pulmonary:      Effort: Pulmonary effort is normal.   Musculoskeletal: Normal range of motion. Skin:     General: Skin is warm and dry. Neurological:      General: No focal deficit present. Mental Status: Alert. Mental status is at baseline. Test ordered:    [x]COVID    _________  []Strep    ___________      Diagnosis and Plan:      Diagnosis Orders   1.  Suspected

## 2020-07-03 LAB
SARS-COV-2: NOT DETECTED
SOURCE: NORMAL

## 2020-07-28 ENCOUNTER — OFFICE VISIT (OUTPATIENT)
Dept: INTERNAL MEDICINE CLINIC | Age: 75
End: 2020-07-28
Payer: MEDICARE

## 2020-07-28 VITALS
DIASTOLIC BLOOD PRESSURE: 59 MMHG | BODY MASS INDEX: 30.01 KG/M2 | WEIGHT: 198 LBS | SYSTOLIC BLOOD PRESSURE: 131 MMHG | HEIGHT: 68 IN

## 2020-07-28 PROCEDURE — 1123F ACP DISCUSS/DSCN MKR DOCD: CPT | Performed by: INTERNAL MEDICINE

## 2020-07-28 PROCEDURE — G8417 CALC BMI ABV UP PARAM F/U: HCPCS | Performed by: INTERNAL MEDICINE

## 2020-07-28 PROCEDURE — 3017F COLORECTAL CA SCREEN DOC REV: CPT | Performed by: INTERNAL MEDICINE

## 2020-07-28 PROCEDURE — 1036F TOBACCO NON-USER: CPT | Performed by: INTERNAL MEDICINE

## 2020-07-28 PROCEDURE — 4040F PNEUMOC VAC/ADMIN/RCVD: CPT | Performed by: INTERNAL MEDICINE

## 2020-07-28 PROCEDURE — 99213 OFFICE O/P EST LOW 20 MIN: CPT | Performed by: INTERNAL MEDICINE

## 2020-07-28 PROCEDURE — G8427 DOCREV CUR MEDS BY ELIG CLIN: HCPCS | Performed by: INTERNAL MEDICINE

## 2020-07-28 RX ORDER — ZOLPIDEM TARTRATE 12.5 MG/1
TABLET, FILM COATED, EXTENDED RELEASE ORAL
Qty: 90 TABLET | Refills: 0 | Status: SHIPPED | OUTPATIENT
Start: 2020-07-28 | End: 2020-10-26 | Stop reason: SDUPTHER

## 2020-07-28 NOTE — PROGRESS NOTES
CHIEF COMPLAINT: Violeta Gay is a 69 y.o. 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 and is considering an ankle replacement he otherwise is been doing fine he denies any chest pain shortness of breath or any other problems    Review of Systems:   Constitutional:  Denies fever or chills   Eyes:  Denies change in visual acuity   HENT:  Denies nasal congestion or sore throat   Respiratory:  Denies cough or shortness of breath   Cardiovascular:  Denies chest pain or edema   GI:  Denies abdominal pain, nausea, vomiting, bloody stools or diarrhea   :  Denies dysuria   Musculoskeletal:  Denies back pain or joint pain   Integument:  Denies rash   Neurologic:  Denies headache, focal weakness or sensory changes   Endocrine:  Denies polyuria or polydipsia   Lymphatic:  Denies swollen glands   Psychiatric:  Denies depression or anxiety     Past Medical History:        Diagnosis Date    Anxiety     BPH (benign prostatic hyperplasia) 5/31/2012    CAD (coronary artery disease)     Hyperlipidemia     LBBB (left bundle branch block) 5/31/2012    Primary insomnia 10/31/2019    Sleep apnea 11/29/2011       Past Surgical History:        Procedure Laterality Date    ANKLE FRACTURE SURGERY      MOHS SURGERY Right 03/11/2020    Right Mid Vertex Scalp       Family History:  Family History   Problem Relation Age of Onset    Cancer Mother     Heart Disease Father     Thyroid Disease Brother        Social History:  Social History     Socioeconomic History    Marital status:      Spouse name: None    Number of children: None    Years of education: None    Highest education level: None   Occupational History    None   Social Needs    Financial resource strain: None    Food insecurity     Worry: None     Inability: None    Transportation needs     Medical: None     Non-medical: None   Tobacco Use    Smoking status: Never Smoker    Smokeless tobacco: Never Used   Substance and Sexual Activity    Alcohol use: Yes     Alcohol/week: 1.7 standard drinks     Types: 2 Standard drinks or equivalent per week    Drug use: None    Sexual activity: None   Lifestyle    Physical activity     Days per week: None     Minutes per session: None    Stress: None   Relationships    Social connections     Talks on phone: None     Gets together: None     Attends Baptism service: None     Active member of club or organization: None     Attends meetings of clubs or organizations: None     Relationship status: None    Intimate partner violence     Fear of current or ex partner: None     Emotionally abused: None     Physically abused: None     Forced sexual activity: None   Other Topics Concern    None   Social History Narrative    None         Allergies:  Patient has no known allergies. Current Medications:    Prior to Admission medications    Medication Sig Start Date End Date Taking? Authorizing Provider   zolpidem (AMBIEN CR) 12.5 MG extended release tablet TAKE ONE TABLET BY MOUTH NIGHTLY AS NEEDED FOR SLEEP- KAMARI 7/28/20 10/27/20 Yes Katey Nayak MD   tamsulosin (FLOMAX) 0.4 MG capsule TAKE 1 CAPSULE BY MOUTH  EVERY DAY 6/8/20  Yes Katey Nayak MD   omeprazole (PRILOSEC) 20 MG delayed release capsule Take 1 capsule by mouth daily 7/11/19  Yes Katey Nayak MD   clopidogrel (PLAVIX) 75 MG tablet Take 1 tablet by mouth daily 7/11/19  Yes Katey Nayak MD   rosuvastatin (CRESTOR) 20 MG tablet Take 1 tablet by mouth daily 7/11/19  Yes Katey Nayak MD   JUBLIA 10 % SOLN APPLY TO THE RIGHT BIG TONAIL AND 4TH TOENAIL ONCE A DAY AFTER BATHING 8/16/18  Yes Duarte Vazquez MD   fluorouracil (EFUDEX) 5 % cream Apply to the forehead, temples and sides of the cheeks 2 times daily for 14 days. 2/17/14  Yes Duarte Vazquez MD   Vitamin D (CHOLECALCIFEROL) 1000 UNITS CAPS capsule Take 1,000 Units by mouth daily.    Yes Historical Provider, MD   aspirin 81 MG EC tablet Take 81 mg by mouth M,W,F only   Yes Historical Provider, MD       Physical Exam:  Vital Signs: BP (!) 131/59   Ht 5' 8\" (1.727 m)   Wt 198 lb (89.8 kg)   BMI 30.11 kg/m²   General: Patient appears  non-toxic  HENT: Atraumatic, normocephalic, oral mucosa moist  Lungs:  Clear bilaterally  Heart: Regular rate and rhythm  Abdomen: Non-distended, soft, non-tender  Extremities: No edema  Neuro: Nonfocal    Medical Decision Making and Plan:  Pertinent Labs & Imaging studies reviewed. (See chart for details)  Fasting blood studies are pending    1. Pure hypercholesterolemia  Problem is stable check above labs continue present meds  - CBC Auto Differential; Future  - Comprehensive Metabolic Panel; Future  - Lipid Panel; Future    2. Primary insomnia  Problem is stable no evidence of addiction or dependency will continue present meds follow-up 3 months  - zolpidem (AMBIEN CR) 12.5 MG extended release tablet; TAKE ONE TABLET BY MOUTH NIGHTLY AS NEEDED FOR SLEEP- KAMARI  Dispense: 90 tablet;  Refill: 0

## 2020-08-06 ENCOUNTER — TELEPHONE (OUTPATIENT)
Dept: DERMATOLOGY | Age: 75
End: 2020-08-06

## 2020-08-07 NOTE — TELEPHONE ENCOUNTER
Patient scheduled for 8/10/20 at 4:15pm.    Patient will wait until his appt for the refill of Efudex.

## 2020-08-10 ENCOUNTER — OFFICE VISIT (OUTPATIENT)
Dept: DERMATOLOGY | Age: 75
End: 2020-08-10
Payer: MEDICARE

## 2020-08-10 VITALS — TEMPERATURE: 97.9 F

## 2020-08-10 PROCEDURE — 1036F TOBACCO NON-USER: CPT | Performed by: DERMATOLOGY

## 2020-08-10 PROCEDURE — 17003 DESTRUCT PREMALG LES 2-14: CPT | Performed by: DERMATOLOGY

## 2020-08-10 PROCEDURE — 1123F ACP DISCUSS/DSCN MKR DOCD: CPT | Performed by: DERMATOLOGY

## 2020-08-10 PROCEDURE — 3017F COLORECTAL CA SCREEN DOC REV: CPT | Performed by: DERMATOLOGY

## 2020-08-10 PROCEDURE — G8427 DOCREV CUR MEDS BY ELIG CLIN: HCPCS | Performed by: DERMATOLOGY

## 2020-08-10 PROCEDURE — 17000 DESTRUCT PREMALG LESION: CPT | Performed by: DERMATOLOGY

## 2020-08-10 PROCEDURE — G8417 CALC BMI ABV UP PARAM F/U: HCPCS | Performed by: DERMATOLOGY

## 2020-08-10 PROCEDURE — 4040F PNEUMOC VAC/ADMIN/RCVD: CPT | Performed by: DERMATOLOGY

## 2020-08-10 PROCEDURE — 99213 OFFICE O/P EST LOW 20 MIN: CPT | Performed by: DERMATOLOGY

## 2020-08-10 RX ORDER — FLUOROURACIL 50 MG/G
CREAM TOPICAL
Qty: 40 G | Refills: 0 | Status: SHIPPED | OUTPATIENT
Start: 2020-08-10 | End: 2022-08-23 | Stop reason: SDUPTHER

## 2020-08-10 NOTE — PROGRESS NOTES
[facility]  Hortencia Latham MD  [phone]      Rob Ray  1945    69 y.o. male     Date of Visit: 8/10/2020    Chief Complaint: follow up for skin cancers and AKs    History of Present Illness:    1. He has a hx of AKs. Recently has been using Efudex on the chest for 2 weeks with irritation. Has used Efudex on and off on the forehead with improvement. 2.  Has hx of BCCs - denies any signs of recurrence. Nodular BCC of the right mid vertex scalp-treated with Mohs by Dr. Georgiana Hua on 3/11/2020. Small nodular BCCs on the right anterior upper arm and right mid arm-treated with curettage at the time of biopsy on 2/26/2019. Nodular BCC on the left upper chest history with curettage at the time of biopsy in October 2018. Superficial BCC of the right supraclavicular region-treated with curettage at time of biopsy on 1/3/2018.     Nodular BCC on the right thigh - excised on 6/23/17.    Superficial BCC on the right central upper back - treated with curettage on 3/28/17. Nodular BCC on the left central lower back - treated with curettage on 3/28/17. Nodular BCC on the right mid thigh-treated with curettage on 10/7/2016. Superficial basal cell carcinoma left upper chest-treated with curettage on 9/21/2016. Nodular BCC of the right mid central back-treated with curettage on 9/21/2016.     Superficial BCC of the left central lower back-treated with curettage on 3/3/2016.     4 BCCs treated with ED&C on 9/2015.    Nodular BCC on the right lateral shoulder, superior  Nodular basal cell carcinoma on the right lateral shoulder, inferior  Superficial basal cell carcinoma of the right upper back  Superficial basal cell carcinoma of the central lower back      Bowen's disease of the left temple-treated with Efudex cream b.i.d. for 4 weeks.    Superficial BCC, right posterior lateral arm-treated with curettage 3/7/14. BCC, right upper arm-excised 3/7/2014.   BCC on the nasal dorsum (biopsy 10/2013) status post Mohs micrographic surgery in December 2013. Review of Systems:  Skin: No new or changing moles. Past Medical History, Family History, Surgical History, Medications and Allergies reviewed. Past Medical History:   Diagnosis Date    Anxiety     BPH (benign prostatic hyperplasia) 5/31/2012    CAD (coronary artery disease)     Hyperlipidemia     LBBB (left bundle branch block) 5/31/2012    Primary insomnia 10/31/2019    Sleep apnea 11/29/2011     Past Surgical History:   Procedure Laterality Date    ANKLE FRACTURE SURGERY      MOHS SURGERY Right 03/11/2020    Right Mid Vertex Scalp       No Known Allergies  Outpatient Medications Marked as Taking for the 8/10/20 encounter (Office Visit) with Jhon Morel MD   Medication Sig Dispense Refill    fluorouracil (EFUDEX) 5 % cream Apply to the forehead, temples and sides of the cheeks 2 times daily for 14 days. 40 g 0    zolpidem (AMBIEN CR) 12.5 MG extended release tablet TAKE ONE TABLET BY MOUTH NIGHTLY AS NEEDED FOR SLEEP- KAMARI 90 tablet 0    tamsulosin (FLOMAX) 0.4 MG capsule TAKE 1 CAPSULE BY MOUTH  EVERY DAY 90 capsule 2    omeprazole (PRILOSEC) 20 MG delayed release capsule Take 1 capsule by mouth daily 90 capsule 3    clopidogrel (PLAVIX) 75 MG tablet Take 1 tablet by mouth daily 90 tablet 3    rosuvastatin (CRESTOR) 20 MG tablet Take 1 tablet by mouth daily 90 tablet 3    JUBLIA 10 % SOLN APPLY TO THE RIGHT BIG TONAIL AND 4TH TOENAIL ONCE A DAY AFTER BATHING 4 mL 0    Vitamin D (CHOLECALCIFEROL) 1000 UNITS CAPS capsule Take 1,000 Units by mouth daily.  aspirin 81 MG EC tablet Take 81 mg by mouth M,W,F only         Physical Examination       The following were examined and determined to be normal: Psych/Neuro, Conjunctivae/eyelids, Gums/teeth/lips, Neck, Abdomen, Back, RUE, LUE, RLE, LLE and Nails/digits.     The following were examined and determined to be abnormal: Scalp/hair, Head/face and Breast/axilla/chest.     Well appearing. 1.  Crown of the scalp - 4 ill defined irreg shaped keratotic pink macules. Upper forehead, left central cheek - several scaly pink macules. Left central upper chest - eroded erythematous patches. 2.  Clear. Assessment and Plan     1. Actinic keratosis - multiple    2 cycles of liquid nitrogen applied to 4 AKs on the crown of the scalp. Patient was educated regarding the potential risks of blister formation, discomfort, hypopigmentation, and scar. Wound care was discussed. Efudex cream twice daily to forehead for 14 days. We discussed the likely side effects of treatment including redness, crusting, itching and burning. 2. History of basal cell carcinomas - clear    Sun protective behaviors and self skin examinations were encouraged. Call for any new or concerning lesions. Return in about 3 months (around 11/10/2020).

## 2020-08-18 RX ORDER — ROSUVASTATIN CALCIUM 20 MG/1
20 TABLET, COATED ORAL DAILY
Qty: 90 TABLET | Refills: 3 | Status: SHIPPED | OUTPATIENT
Start: 2020-08-18 | End: 2021-07-08

## 2020-08-18 RX ORDER — OMEPRAZOLE 20 MG/1
20 CAPSULE, DELAYED RELEASE ORAL DAILY
Qty: 90 CAPSULE | Refills: 3 | Status: SHIPPED | OUTPATIENT
Start: 2020-08-18 | End: 2022-08-09

## 2020-08-18 RX ORDER — CLOPIDOGREL BISULFATE 75 MG/1
75 TABLET ORAL DAILY
Qty: 90 TABLET | Refills: 3 | Status: SHIPPED | OUTPATIENT
Start: 2020-08-18 | End: 2021-07-08

## 2020-09-09 ENCOUNTER — OFFICE VISIT (OUTPATIENT)
Dept: INTERNAL MEDICINE CLINIC | Age: 75
End: 2020-09-09
Payer: MEDICARE

## 2020-09-09 VITALS
SYSTOLIC BLOOD PRESSURE: 130 MMHG | BODY MASS INDEX: 29.4 KG/M2 | DIASTOLIC BLOOD PRESSURE: 78 MMHG | HEIGHT: 68 IN | WEIGHT: 194 LBS | TEMPERATURE: 98.2 F

## 2020-09-09 PROCEDURE — 93000 ELECTROCARDIOGRAM COMPLETE: CPT | Performed by: INTERNAL MEDICINE

## 2020-09-09 PROCEDURE — 3017F COLORECTAL CA SCREEN DOC REV: CPT | Performed by: INTERNAL MEDICINE

## 2020-09-09 PROCEDURE — 1036F TOBACCO NON-USER: CPT | Performed by: INTERNAL MEDICINE

## 2020-09-09 PROCEDURE — G8417 CALC BMI ABV UP PARAM F/U: HCPCS | Performed by: INTERNAL MEDICINE

## 2020-09-09 PROCEDURE — G0008 ADMIN INFLUENZA VIRUS VAC: HCPCS | Performed by: INTERNAL MEDICINE

## 2020-09-09 PROCEDURE — G8427 DOCREV CUR MEDS BY ELIG CLIN: HCPCS | Performed by: INTERNAL MEDICINE

## 2020-09-09 PROCEDURE — 90694 VACC AIIV4 NO PRSRV 0.5ML IM: CPT | Performed by: INTERNAL MEDICINE

## 2020-09-09 PROCEDURE — 4040F PNEUMOC VAC/ADMIN/RCVD: CPT | Performed by: INTERNAL MEDICINE

## 2020-09-09 PROCEDURE — 99214 OFFICE O/P EST MOD 30 MIN: CPT | Performed by: INTERNAL MEDICINE

## 2020-09-09 PROCEDURE — 1123F ACP DISCUSS/DSCN MKR DOCD: CPT | Performed by: INTERNAL MEDICINE

## 2020-09-09 ASSESSMENT — PATIENT HEALTH QUESTIONNAIRE - PHQ9
2. FEELING DOWN, DEPRESSED OR HOPELESS: 0
1. LITTLE INTEREST OR PLEASURE IN DOING THINGS: 0
SUM OF ALL RESPONSES TO PHQ QUESTIONS 1-9: 0
SUM OF ALL RESPONSES TO PHQ QUESTIONS 1-9: 0
SUM OF ALL RESPONSES TO PHQ9 QUESTIONS 1 & 2: 0

## 2020-09-09 ASSESSMENT — LIFESTYLE VARIABLES
AUDIT TOTAL SCORE: 2
HAVE YOU OR SOMEONE ELSE BEEN INJURED AS A RESULT OF YOUR DRINKING: 0
HOW OFTEN DURING THE LAST YEAR HAVE YOU HAD A FEELING OF GUILT OR REMORSE AFTER DRINKING: 0
HOW OFTEN DURING THE LAST YEAR HAVE YOU FOUND THAT YOU WERE NOT ABLE TO STOP DRINKING ONCE YOU HAD STARTED: 0
HOW OFTEN DURING THE LAST YEAR HAVE YOU NEEDED AN ALCOHOLIC DRINK FIRST THING IN THE MORNING TO GET YOURSELF GOING AFTER A NIGHT OF HEAVY DRINKING: 0
HOW OFTEN DO YOU HAVE A DRINK CONTAINING ALCOHOL: 2
HOW MANY STANDARD DRINKS CONTAINING ALCOHOL DO YOU HAVE ON A TYPICAL DAY: 0
HAS A RELATIVE, FRIEND, DOCTOR, OR ANOTHER HEALTH PROFESSIONAL EXPRESSED CONCERN ABOUT YOUR DRINKING OR SUGGESTED YOU CUT DOWN: 0
AUDIT-C TOTAL SCORE: 2
HOW OFTEN DURING THE LAST YEAR HAVE YOU FAILED TO DO WHAT WAS NORMALLY EXPECTED FROM YOU BECAUSE OF DRINKING: 0
HOW OFTEN DURING THE LAST YEAR HAVE YOU BEEN UNABLE TO REMEMBER WHAT HAPPENED THE NIGHT BEFORE BECAUSE YOU HAD BEEN DRINKING: 0
HOW OFTEN DO YOU HAVE SIX OR MORE DRINKS ON ONE OCCASION: 0

## 2020-09-09 NOTE — PROGRESS NOTES
Preoperative Consultation      Neri Jin  YOB: 1945    Date of Service:  9/9/2020    Chief Complaint   Patient presents with   Jackelin Ibanez ankle,10/01/20       This patient presents to the office today for a preoperative consultation at the request of surgeon, Dr. Zavaleta Men  To have ankle replacement of Lt ankle      Planned anesthesia:  General  Known anesthesia problems: None  Bleeding risk: On Plavix and ASA  Personal or FH of DVT/PE:  None  Patient objection to receiving blood products: No    Past Medical History:   Diagnosis Date    Anxiety     BPH (benign prostatic hyperplasia) 5/31/2012    CAD (coronary artery disease)     Hyperlipidemia     LBBB (left bundle branch block) 5/31/2012    Primary insomnia 10/31/2019    Sleep apnea 11/29/2011       Past Surgical History:   Procedure Laterality Date    ANKLE FRACTURE SURGERY      MOHS SURGERY Right 03/11/2020    Right Mid Vertex Scalp       No Known Allergies    Outpatient Medications Marked as Taking for the 9/9/20 encounter (Office Visit) with Madeline Andrews MD   Medication Sig Dispense Refill    rosuvastatin (CRESTOR) 20 MG tablet TAKE 1 TABLET BY MOUTH  DAILY 90 tablet 3    clopidogrel (PLAVIX) 75 MG tablet TAKE 1 TABLET BY MOUTH  DAILY 90 tablet 3    omeprazole (PRILOSEC) 20 MG delayed release capsule TAKE 1 CAPSULE BY MOUTH  DAILY 90 capsule 3    fluorouracil (EFUDEX) 5 % cream Apply to the forehead, temples and sides of the cheeks 2 times daily for 14 days. 40 g 0    zolpidem (AMBIEN CR) 12.5 MG extended release tablet TAKE ONE TABLET BY MOUTH NIGHTLY AS NEEDED FOR SLEEP- KAMARI 90 tablet 0    tamsulosin (FLOMAX) 0.4 MG capsule TAKE 1 CAPSULE BY MOUTH  EVERY DAY 90 capsule 2    JUBLIA 10 % SOLN APPLY TO THE RIGHT BIG TONAIL AND 4TH TOENAIL ONCE A DAY AFTER BATHING 4 mL 0    Vitamin D (CHOLECALCIFEROL) 1000 UNITS CAPS capsule Take 1,000 Units by mouth daily.       aspirin 81 MG EC tablet Take 81 mg by mouth M,W,F only         Family History   Problem Relation Age of Onset    Cancer Mother     Heart Disease Father     Thyroid Disease Brother        Social History     Socioeconomic History    Marital status:      Spouse name: Not on file    Number of children: Not on file    Years of education: Not on file    Highest education level: Not on file   Occupational History    Not on file   Social Needs    Financial resource strain: Not on file    Food insecurity     Worry: Not on file     Inability: Not on file    Transportation needs     Medical: Not on file     Non-medical: Not on file   Tobacco Use    Smoking status: Never Smoker    Smokeless tobacco: Never Used   Substance and Sexual Activity    Alcohol use:  Yes     Alcohol/week: 1.7 standard drinks     Types: 2 Standard drinks or equivalent per week    Drug use: Not on file    Sexual activity: Not on file   Lifestyle    Physical activity     Days per week: Not on file     Minutes per session: Not on file    Stress: Not on file   Relationships    Social connections     Talks on phone: Not on file     Gets together: Not on file     Attends Christianity service: Not on file     Active member of club or organization: Not on file     Attends meetings of clubs or organizations: Not on file     Relationship status: Not on file    Intimate partner violence     Fear of current or ex partner: Not on file     Emotionally abused: Not on file     Physically abused: Not on file     Forced sexual activity: Not on file   Other Topics Concern    Not on file   Social History Narrative    Not on file       ROS:    Constitutional:  Denies fever or chills   Eyes:  Denies change in visual acuity   HENT:  Denies nasal congestion or sore throat   Respiratory:  Denies cough or shortness of breath   Cardiovascular:  Denies chest pain or edema   GI:  Denies abdominal pain, nausea, vomiting, bloody stools or diarrhea   :  Denies dysuria   Musculoskeletal:  Denies orthopedic, or carotid endarterectomy, etc.)  Revised Cardiac Risk Index Risk factors: History of ischemic heart disease  Measurement of Exercise Tolerance before Surgery >4 Yes    According to the 2014 ACC/AHA pre-operative risk assessment guidelines Aureliano Orozco is a low risk for major cardiac complications during a intermediate risk procedure and may continue as planned. Specific medication recommendations are listed below. Medications recommended to continue should be taken with a sip of water even when NPO. Further recommendations from consultants: None    Medication Recommendations:  Plavix should be HELD 7 days prior to surgery and restarted with the postoperative diet     Plan:     1. Preoperative workup as follows: History and physicall,EKG,blood studies  2. Change in medication regimen before surgery: To hold plavix 7 days prior to surgery  3. Prophylaxis for cardiac events with perioperative beta-blockers:  Not indicated  4.  Deep vein thrombosis prophylaxis:  Early ambulation  Tien Mauricio

## 2020-09-10 DIAGNOSIS — E78.00 PURE HYPERCHOLESTEROLEMIA: ICD-10-CM

## 2020-09-10 DIAGNOSIS — Z01.818 PREOP EXAMINATION: ICD-10-CM

## 2020-09-10 DIAGNOSIS — Z12.5 SCREENING PSA (PROSTATE SPECIFIC ANTIGEN): ICD-10-CM

## 2020-09-10 DIAGNOSIS — E55.9 VITAMIN D DEFICIENCY: ICD-10-CM

## 2020-09-10 LAB
A/G RATIO: 1.9 (ref 1.1–2.2)
ALBUMIN SERPL-MCNC: 4.4 G/DL (ref 3.4–5)
ALP BLD-CCNC: 56 U/L (ref 40–129)
ALT SERPL-CCNC: 14 U/L (ref 10–40)
ANION GAP SERPL CALCULATED.3IONS-SCNC: 10 MMOL/L (ref 3–16)
AST SERPL-CCNC: 17 U/L (ref 15–37)
BASOPHILS ABSOLUTE: 0.1 K/UL (ref 0–0.2)
BASOPHILS RELATIVE PERCENT: 0.8 %
BILIRUB SERPL-MCNC: 0.4 MG/DL (ref 0–1)
BILIRUBIN URINE: NEGATIVE
BLOOD, URINE: NEGATIVE
BUN BLDV-MCNC: 20 MG/DL (ref 7–20)
CALCIUM SERPL-MCNC: 10 MG/DL (ref 8.3–10.6)
CHLORIDE BLD-SCNC: 107 MMOL/L (ref 99–110)
CHOLESTEROL, TOTAL: 145 MG/DL (ref 0–199)
CLARITY: CLEAR
CO2: 25 MMOL/L (ref 21–32)
COLOR: YELLOW
CREAT SERPL-MCNC: 1 MG/DL (ref 0.8–1.3)
EOSINOPHILS ABSOLUTE: 0.1 K/UL (ref 0–0.6)
EOSINOPHILS RELATIVE PERCENT: 2 %
GFR AFRICAN AMERICAN: >60
GFR NON-AFRICAN AMERICAN: >60
GLOBULIN: 2.3 G/DL
GLUCOSE BLD-MCNC: 125 MG/DL (ref 70–99)
GLUCOSE URINE: NEGATIVE MG/DL
HCT VFR BLD CALC: 43.4 % (ref 40.5–52.5)
HDLC SERPL-MCNC: 52 MG/DL (ref 40–60)
HEMOGLOBIN: 14.5 G/DL (ref 13.5–17.5)
KETONES, URINE: NEGATIVE MG/DL
LDL CHOLESTEROL CALCULATED: 73 MG/DL
LEUKOCYTE ESTERASE, URINE: NEGATIVE
LYMPHOCYTES ABSOLUTE: 1.2 K/UL (ref 1–5.1)
LYMPHOCYTES RELATIVE PERCENT: 18.4 %
MCH RBC QN AUTO: 30.5 PG (ref 26–34)
MCHC RBC AUTO-ENTMCNC: 33.3 G/DL (ref 31–36)
MCV RBC AUTO: 91.6 FL (ref 80–100)
MICROSCOPIC EXAMINATION: NORMAL
MONOCYTES ABSOLUTE: 0.5 K/UL (ref 0–1.3)
MONOCYTES RELATIVE PERCENT: 8.3 %
NEUTROPHILS ABSOLUTE: 4.6 K/UL (ref 1.7–7.7)
NEUTROPHILS RELATIVE PERCENT: 70.5 %
NITRITE, URINE: NEGATIVE
PDW BLD-RTO: 13.1 % (ref 12.4–15.4)
PH UA: 6 (ref 5–8)
PLATELET # BLD: 229 K/UL (ref 135–450)
PMV BLD AUTO: 8.3 FL (ref 5–10.5)
POTASSIUM SERPL-SCNC: 4.2 MMOL/L (ref 3.5–5.1)
PROSTATE SPECIFIC ANTIGEN: 1.07 NG/ML (ref 0–4)
PROTEIN UA: NEGATIVE MG/DL
RBC # BLD: 4.74 M/UL (ref 4.2–5.9)
SODIUM BLD-SCNC: 142 MMOL/L (ref 136–145)
SPECIFIC GRAVITY UA: 1.02 (ref 1–1.03)
TOTAL PROTEIN: 6.7 G/DL (ref 6.4–8.2)
TRIGL SERPL-MCNC: 101 MG/DL (ref 0–150)
TSH SERPL DL<=0.05 MIU/L-ACNC: 3.09 UIU/ML (ref 0.27–4.2)
URINE TYPE: NORMAL
UROBILINOGEN, URINE: 0.2 E.U./DL
VITAMIN D 25-HYDROXY: 42.2 NG/ML
VLDLC SERPL CALC-MCNC: 20 MG/DL
WBC # BLD: 6.6 K/UL (ref 4–11)

## 2020-09-25 ENCOUNTER — OFFICE VISIT (OUTPATIENT)
Dept: PRIMARY CARE CLINIC | Age: 75
End: 2020-09-25
Payer: MEDICARE

## 2020-09-25 PROCEDURE — G8428 CUR MEDS NOT DOCUMENT: HCPCS | Performed by: NURSE PRACTITIONER

## 2020-09-25 PROCEDURE — G8417 CALC BMI ABV UP PARAM F/U: HCPCS | Performed by: NURSE PRACTITIONER

## 2020-09-25 PROCEDURE — 99211 OFF/OP EST MAY X REQ PHY/QHP: CPT | Performed by: NURSE PRACTITIONER

## 2020-09-26 LAB — SARS-COV-2, NAA: NOT DETECTED

## 2020-09-29 NOTE — PROGRESS NOTES
[facility] PRE-SURGICAL TESTING INSTRUCTIONS                              PRIOR TO PROCEDURE DATE:  1. Please follow any guidelines/instructions prior to your procedure as advised by your surgeon. 2. Arrange for someone to drive you home and be with you for the first 24 hours after discharge for your safety after your procedure for which you received sedation. Ensure it is someone we can share information with regarding your discharge. 3. You must contact your surgeon for instructions IF:   You are taking any blood thinners, aspirin, anti-inflammatory or vitamin E.   There is a change in your physical condition such as a cold, fever, rash, cuts, sores or any other infection, especially near your surgical site. 4. Do not drink alcohol the day before or day of your procedure. 5. A Pre-op History and Physical for surgery MUST be completed by your Physician or Urgent Care within 30 days of your procedure date. Please bring a copy with you on the day of your procedure and along with any other testing performed. THE DAY OF YOUR PROCEDURE:  1. Follow instructions for ARRIVAL TIME as DIRECTED BY YOUR SURGEON. I    2. Enter the MAIN entrance from [employer] and follow the signs to the free 8bit or [employer] parking (offered free of charge 6am-5pm). 3. Enter the Main Entrance of the hospital (do not enter from the lower level of the parking garage). Upon entrance, check in with the  at the main desk on your left. If no one is available at the desk, proceed into the [facility] Waiting Room and go through the door directly into the [facility]. There is a Check-in desk ACROSS from Room 5 (marked with a sign hanging from the ceiling). The phone number for the surgery center is [phone]. 4. Please call [phone] option #2 option #2 if you have not been preregistered yet. On the day of your procedure bring your insurance card and photo ID.  You will be registered at your bedside once brought back to your room. 5. DO NOT EAT ANYTHING eight hours prior to surgery. May have 8 ounces of water 4 hours prior to surgery. 6. MEDICATIONS    Take the following medications with a SMALL sip of water: FLOMAX PRILOSEC   Use your usual dose of inhalers the morning of surgery. BRING your rescue inhaler with you to hospital.    Anesthesia does NOT want you to take insulin the morning of surgery. They will control your blood sugar while you are at the hospital. Please contact your ordering physician for instructions regarding your insulin the night before your procedure. If you have an insulin pump, please keep it set on basal rate. 7. Do not swallow water when brushing teeth. No gum, candy, mints or ice chips. Refrain from smoking or at least decrease the amount. 8. Dress in loose, comfortable clothing appropriate for redressing after your procedure. Do not wear jewelry (including body piercings), make-up (especially NO eye make-up), fingernail polish (NO toenail polish if foot/leg surgery), lotion, powders or metal hairclips. 9. Dentures, glasses, or contacts will need to be removed before your procedure. Bring cases for your glasses, contacts, dentures, or hearing aids to protect them while you are in surgery. 10. If you use a CPAP, please bring it with you on the day of your procedure. 11. We recommend that valuable personal  belongings such as cash, cell phones, e-tablets or jewelry, be left at home during your stay. The hospital will not be responsible for valuables that are not secured in the hospital safe. However, if your insurance requires a co-pay, you may want to bring a method of payment, i.e. Check or credit card, if you wish to pay your co-pay the day of surgery. 12. If you are to stay overnight, you may bring a bag with personal items.  Please have any large items you may need brought in by your family after your arrival to your

## 2020-09-30 ENCOUNTER — ANESTHESIA EVENT (OUTPATIENT)
Dept: OPERATING ROOM | Age: 75
DRG: 469 | End: 2020-09-30
Payer: MEDICARE

## 2020-10-01 ENCOUNTER — HOSPITAL ENCOUNTER (INPATIENT)
Age: 75
LOS: 1 days | Discharge: HOME | DRG: 469 | End: 2020-10-02
Attending: ORTHOPAEDIC SURGERY | Admitting: ORTHOPAEDIC SURGERY
Payer: MEDICARE

## 2020-10-01 ENCOUNTER — APPOINTMENT (OUTPATIENT)
Dept: GENERAL RADIOLOGY | Age: 75
DRG: 469 | End: 2020-10-01
Attending: ORTHOPAEDIC SURGERY
Payer: MEDICARE

## 2020-10-01 ENCOUNTER — ANESTHESIA (OUTPATIENT)
Dept: OPERATING ROOM | Age: 75
DRG: 469 | End: 2020-10-01
Payer: MEDICARE

## 2020-10-01 VITALS — SYSTOLIC BLOOD PRESSURE: 89 MMHG | DIASTOLIC BLOOD PRESSURE: 47 MMHG | TEMPERATURE: 98.2 F | OXYGEN SATURATION: 98 %

## 2020-10-01 PROBLEM — M19.072 ARTHRITIS OF LEFT ANKLE: Status: ACTIVE | Noted: 2020-10-01

## 2020-10-01 LAB
GLUCOSE BLD-MCNC: 107 MG/DL (ref 70–99)
PERFORMED ON: ABNORMAL

## 2020-10-01 PROCEDURE — 3E0T3BZ INTRODUCTION OF ANESTHETIC AGENT INTO PERIPHERAL NERVES AND PLEXI, PERCUTANEOUS APPROACH: ICD-10-PCS | Performed by: ANESTHESIOLOGY

## 2020-10-01 PROCEDURE — C1776 JOINT DEVICE (IMPLANTABLE): HCPCS | Performed by: ORTHOPAEDIC SURGERY

## 2020-10-01 PROCEDURE — 0SPD04Z REMOVAL OF INTERNAL FIXATION DEVICE FROM LEFT KNEE JOINT, OPEN APPROACH: ICD-10-PCS | Performed by: ORTHOPAEDIC SURGERY

## 2020-10-01 PROCEDURE — 0SRG0JZ REPLACEMENT OF LEFT ANKLE JOINT WITH SYNTHETIC SUBSTITUTE, OPEN APPROACH: ICD-10-PCS | Performed by: ORTHOPAEDIC SURGERY

## 2020-10-01 PROCEDURE — 2580000003 HC RX 258: Performed by: ANESTHESIOLOGY

## 2020-10-01 PROCEDURE — 73600 X-RAY EXAM OF ANKLE: CPT

## 2020-10-01 PROCEDURE — 6360000002 HC RX W HCPCS: Performed by: ORTHOPAEDIC SURGERY

## 2020-10-01 PROCEDURE — 3700000000 HC ANESTHESIA ATTENDED CARE: Performed by: ORTHOPAEDIC SURGERY

## 2020-10-01 PROCEDURE — 2500000003 HC RX 250 WO HCPCS: Performed by: NURSE ANESTHETIST, CERTIFIED REGISTERED

## 2020-10-01 PROCEDURE — 64445 NJX AA&/STRD SCIATIC NRV IMG: CPT | Performed by: ANESTHESIOLOGY

## 2020-10-01 PROCEDURE — 7100000001 HC PACU RECOVERY - ADDTL 15 MIN: Performed by: ORTHOPAEDIC SURGERY

## 2020-10-01 PROCEDURE — 2720000010 HC SURG SUPPLY STERILE: Performed by: ORTHOPAEDIC SURGERY

## 2020-10-01 PROCEDURE — 87641 MR-STAPH DNA AMP PROBE: CPT

## 2020-10-01 PROCEDURE — 2780000010 HC IMPLANT OTHER: Performed by: ORTHOPAEDIC SURGERY

## 2020-10-01 PROCEDURE — 2580000003 HC RX 258: Performed by: NURSE ANESTHETIST, CERTIFIED REGISTERED

## 2020-10-01 PROCEDURE — 6370000000 HC RX 637 (ALT 250 FOR IP): Performed by: ORTHOPAEDIC SURGERY

## 2020-10-01 PROCEDURE — 3700000001 HC ADD 15 MINUTES (ANESTHESIA): Performed by: ORTHOPAEDIC SURGERY

## 2020-10-01 PROCEDURE — 7100000000 HC PACU RECOVERY - FIRST 15 MIN: Performed by: ORTHOPAEDIC SURGERY

## 2020-10-01 PROCEDURE — C9359 IMPLNT,BON VOID FILLER-PUTTY: HCPCS | Performed by: ORTHOPAEDIC SURGERY

## 2020-10-01 PROCEDURE — 2709999900 HC NON-CHARGEABLE SUPPLY: Performed by: ORTHOPAEDIC SURGERY

## 2020-10-01 PROCEDURE — 6360000002 HC RX W HCPCS: Performed by: ANESTHESIOLOGY

## 2020-10-01 PROCEDURE — 2580000003 HC RX 258: Performed by: ORTHOPAEDIC SURGERY

## 2020-10-01 PROCEDURE — 3209999900 FLUORO FOR SURGICAL PROCEDURES

## 2020-10-01 PROCEDURE — 6360000002 HC RX W HCPCS: Performed by: NURSE ANESTHETIST, CERTIFIED REGISTERED

## 2020-10-01 PROCEDURE — 3600000014 HC SURGERY LEVEL 4 ADDTL 15MIN: Performed by: ORTHOPAEDIC SURGERY

## 2020-10-01 PROCEDURE — 0L8P0ZZ DIVISION OF LEFT LOWER LEG TENDON, OPEN APPROACH: ICD-10-PCS | Performed by: ORTHOPAEDIC SURGERY

## 2020-10-01 PROCEDURE — 1200000000 HC SEMI PRIVATE

## 2020-10-01 PROCEDURE — 64447 NJX AA&/STRD FEMORAL NRV IMG: CPT | Performed by: ANESTHESIOLOGY

## 2020-10-01 PROCEDURE — 3600000004 HC SURGERY LEVEL 4 BASE: Performed by: ORTHOPAEDIC SURGERY

## 2020-10-01 DEVICE — IMPLANTABLE DEVICE: Type: IMPLANTABLE DEVICE | Site: ANKLE | Status: FUNCTIONAL

## 2020-10-01 DEVICE — DBX PUTTY, 1CC
Type: IMPLANTABLE DEVICE | Site: ANKLE | Status: FUNCTIONAL
Brand: DBX®

## 2020-10-01 RX ORDER — DEXAMETHASONE SODIUM PHOSPHATE 10 MG/ML
INJECTION, SOLUTION INTRAMUSCULAR; INTRAVENOUS PRN
Status: DISCONTINUED | OUTPATIENT
Start: 2020-10-01 | End: 2020-10-01 | Stop reason: SDUPTHER

## 2020-10-01 RX ORDER — ESMOLOL HYDROCHLORIDE 10 MG/ML
INJECTION INTRAVENOUS PRN
Status: DISCONTINUED | OUTPATIENT
Start: 2020-10-01 | End: 2020-10-01 | Stop reason: SDUPTHER

## 2020-10-01 RX ORDER — MORPHINE SULFATE 4 MG/ML
1 INJECTION, SOLUTION INTRAMUSCULAR; INTRAVENOUS EVERY 5 MIN PRN
Status: DISCONTINUED | OUTPATIENT
Start: 2020-10-01 | End: 2020-10-01 | Stop reason: HOSPADM

## 2020-10-01 RX ORDER — TAMSULOSIN HYDROCHLORIDE 0.4 MG/1
0.4 CAPSULE ORAL DAILY
Status: DISCONTINUED | OUTPATIENT
Start: 2020-10-02 | End: 2020-10-02 | Stop reason: HOSPADM

## 2020-10-01 RX ORDER — ROPIVACAINE HYDROCHLORIDE 5 MG/ML
INJECTION, SOLUTION EPIDURAL; INFILTRATION; PERINEURAL
Status: COMPLETED
Start: 2020-10-01 | End: 2020-10-01

## 2020-10-01 RX ORDER — ESMOLOL HYDROCHLORIDE 10 MG/ML
INJECTION, SOLUTION INTRAVENOUS PRN
Status: DISCONTINUED | OUTPATIENT
Start: 2020-10-01 | End: 2020-10-01 | Stop reason: SDUPTHER

## 2020-10-01 RX ORDER — SODIUM CHLORIDE 0.9 % (FLUSH) 0.9 %
10 SYRINGE (ML) INJECTION PRN
Status: DISCONTINUED | OUTPATIENT
Start: 2020-10-01 | End: 2020-10-02 | Stop reason: HOSPADM

## 2020-10-01 RX ORDER — HYDROCODONE BITARTRATE AND ACETAMINOPHEN 5; 325 MG/1; MG/1
1 TABLET ORAL EVERY 4 HOURS PRN
Status: DISCONTINUED | OUTPATIENT
Start: 2020-10-01 | End: 2020-10-02 | Stop reason: HOSPADM

## 2020-10-01 RX ORDER — ONDANSETRON 2 MG/ML
4 INJECTION INTRAMUSCULAR; INTRAVENOUS EVERY 6 HOURS PRN
Status: DISCONTINUED | OUTPATIENT
Start: 2020-10-01 | End: 2020-10-02 | Stop reason: HOSPADM

## 2020-10-01 RX ORDER — ROCURONIUM BROMIDE 10 MG/ML
INJECTION, SOLUTION INTRAVENOUS PRN
Status: DISCONTINUED | OUTPATIENT
Start: 2020-10-01 | End: 2020-10-01 | Stop reason: SDUPTHER

## 2020-10-01 RX ORDER — LIDOCAINE HYDROCHLORIDE 20 MG/ML
INJECTION, SOLUTION INFILTRATION; PERINEURAL PRN
Status: DISCONTINUED | OUTPATIENT
Start: 2020-10-01 | End: 2020-10-01 | Stop reason: SDUPTHER

## 2020-10-01 RX ORDER — HYDROCODONE BITARTRATE AND ACETAMINOPHEN 5; 325 MG/1; MG/1
2 TABLET ORAL EVERY 4 HOURS PRN
Status: DISCONTINUED | OUTPATIENT
Start: 2020-10-01 | End: 2020-10-02 | Stop reason: HOSPADM

## 2020-10-01 RX ORDER — DEXAMETHASONE SODIUM PHOSPHATE 10 MG/ML
INJECTION, SOLUTION INTRAMUSCULAR; INTRAVENOUS
Status: COMPLETED
Start: 2020-10-01 | End: 2020-10-01

## 2020-10-01 RX ORDER — MEPERIDINE HYDROCHLORIDE 25 MG/ML
12.5 INJECTION INTRAMUSCULAR; INTRAVENOUS; SUBCUTANEOUS EVERY 5 MIN PRN
Status: DISCONTINUED | OUTPATIENT
Start: 2020-10-01 | End: 2020-10-01 | Stop reason: HOSPADM

## 2020-10-01 RX ORDER — SODIUM CHLORIDE 0.9 % (FLUSH) 0.9 %
10 SYRINGE (ML) INJECTION EVERY 12 HOURS SCHEDULED
Status: DISCONTINUED | OUTPATIENT
Start: 2020-10-01 | End: 2020-10-02 | Stop reason: HOSPADM

## 2020-10-01 RX ORDER — EPHEDRINE SULFATE 50 MG/ML
INJECTION INTRAVENOUS PRN
Status: DISCONTINUED | OUTPATIENT
Start: 2020-10-01 | End: 2020-10-01 | Stop reason: SDUPTHER

## 2020-10-01 RX ORDER — PANTOPRAZOLE SODIUM 40 MG/1
40 TABLET, DELAYED RELEASE ORAL
Status: DISCONTINUED | OUTPATIENT
Start: 2020-10-02 | End: 2020-10-02 | Stop reason: HOSPADM

## 2020-10-01 RX ORDER — CLOPIDOGREL BISULFATE 75 MG/1
75 TABLET ORAL DAILY
Status: DISCONTINUED | OUTPATIENT
Start: 2020-10-02 | End: 2020-10-02 | Stop reason: HOSPADM

## 2020-10-01 RX ORDER — OLMESARTAN MEDOXOMIL 20 MG/1
1 TABLET ORAL
COMMUNITY
Start: 2020-09-23

## 2020-10-01 RX ORDER — OXYCODONE HYDROCHLORIDE 5 MG/1
5 TABLET ORAL PRN
Status: DISCONTINUED | OUTPATIENT
Start: 2020-10-01 | End: 2020-10-01 | Stop reason: HOSPADM

## 2020-10-01 RX ORDER — ONDANSETRON 2 MG/ML
INJECTION INTRAMUSCULAR; INTRAVENOUS PRN
Status: DISCONTINUED | OUTPATIENT
Start: 2020-10-01 | End: 2020-10-01 | Stop reason: SDUPTHER

## 2020-10-01 RX ORDER — ASPIRIN 81 MG/1
81 TABLET ORAL DAILY
Status: DISCONTINUED | OUTPATIENT
Start: 2020-10-02 | End: 2020-10-02 | Stop reason: HOSPADM

## 2020-10-01 RX ORDER — LABETALOL 20 MG/4 ML (5 MG/ML) INTRAVENOUS SYRINGE
5 EVERY 10 MIN PRN
Status: DISCONTINUED | OUTPATIENT
Start: 2020-10-01 | End: 2020-10-01 | Stop reason: HOSPADM

## 2020-10-01 RX ORDER — METOCLOPRAMIDE HYDROCHLORIDE 5 MG/ML
10 INJECTION INTRAMUSCULAR; INTRAVENOUS
Status: DISCONTINUED | OUTPATIENT
Start: 2020-10-01 | End: 2020-10-01 | Stop reason: HOSPADM

## 2020-10-01 RX ORDER — LOSARTAN POTASSIUM 25 MG/1
50 TABLET ORAL DAILY
Status: DISCONTINUED | OUTPATIENT
Start: 2020-10-02 | End: 2020-10-02 | Stop reason: HOSPADM

## 2020-10-01 RX ORDER — MORPHINE SULFATE 2 MG/ML
4 INJECTION, SOLUTION INTRAMUSCULAR; INTRAVENOUS
Status: DISCONTINUED | OUTPATIENT
Start: 2020-10-01 | End: 2020-10-02 | Stop reason: HOSPADM

## 2020-10-01 RX ORDER — METOPROLOL TARTRATE 5 MG/5ML
INJECTION INTRAVENOUS PRN
Status: DISCONTINUED | OUTPATIENT
Start: 2020-10-01 | End: 2020-10-01 | Stop reason: SDUPTHER

## 2020-10-01 RX ORDER — HYDROMORPHONE HCL 110MG/55ML
PATIENT CONTROLLED ANALGESIA SYRINGE INTRAVENOUS PRN
Status: DISCONTINUED | OUTPATIENT
Start: 2020-10-01 | End: 2020-10-01 | Stop reason: SDUPTHER

## 2020-10-01 RX ORDER — ROPIVACAINE HYDROCHLORIDE 5 MG/ML
INJECTION, SOLUTION EPIDURAL; INFILTRATION; PERINEURAL PRN
Status: DISCONTINUED | OUTPATIENT
Start: 2020-10-01 | End: 2020-10-01 | Stop reason: SDUPTHER

## 2020-10-01 RX ORDER — HYDRALAZINE HYDROCHLORIDE 20 MG/ML
5 INJECTION INTRAMUSCULAR; INTRAVENOUS EVERY 10 MIN PRN
Status: DISCONTINUED | OUTPATIENT
Start: 2020-10-01 | End: 2020-10-01 | Stop reason: HOSPADM

## 2020-10-01 RX ORDER — SODIUM CHLORIDE, SODIUM LACTATE, POTASSIUM CHLORIDE, CALCIUM CHLORIDE 600; 310; 30; 20 MG/100ML; MG/100ML; MG/100ML; MG/100ML
INJECTION, SOLUTION INTRAVENOUS CONTINUOUS
Status: DISCONTINUED | OUTPATIENT
Start: 2020-10-01 | End: 2020-10-02 | Stop reason: HOSPADM

## 2020-10-01 RX ORDER — PROPOFOL 10 MG/ML
INJECTION, EMULSION INTRAVENOUS PRN
Status: DISCONTINUED | OUTPATIENT
Start: 2020-10-01 | End: 2020-10-01 | Stop reason: SDUPTHER

## 2020-10-01 RX ORDER — EPHEDRINE SULFATE 50 MG/ML
INJECTION, SOLUTION INTRAVENOUS PRN
Status: DISCONTINUED | OUTPATIENT
Start: 2020-10-01 | End: 2020-10-01

## 2020-10-01 RX ORDER — CEFAZOLIN SODIUM 2 G/50ML
2 SOLUTION INTRAVENOUS EVERY 8 HOURS
Status: COMPLETED | OUTPATIENT
Start: 2020-10-01 | End: 2020-10-02

## 2020-10-01 RX ORDER — LORAZEPAM 0.5 MG/1
0.5 TABLET ORAL NIGHTLY
Status: DISCONTINUED | OUTPATIENT
Start: 2020-10-01 | End: 2020-10-02 | Stop reason: HOSPADM

## 2020-10-01 RX ORDER — DIPHENHYDRAMINE HYDROCHLORIDE 50 MG/ML
12.5 INJECTION INTRAMUSCULAR; INTRAVENOUS
Status: DISCONTINUED | OUTPATIENT
Start: 2020-10-01 | End: 2020-10-01 | Stop reason: HOSPADM

## 2020-10-01 RX ORDER — PROMETHAZINE HYDROCHLORIDE 12.5 MG/1
12.5 TABLET ORAL EVERY 6 HOURS PRN
Status: DISCONTINUED | OUTPATIENT
Start: 2020-10-01 | End: 2020-10-02 | Stop reason: HOSPADM

## 2020-10-01 RX ORDER — SUCCINYLCHOLINE CHLORIDE 20 MG/ML
INJECTION INTRAMUSCULAR; INTRAVENOUS PRN
Status: DISCONTINUED | OUTPATIENT
Start: 2020-10-01 | End: 2020-10-01 | Stop reason: SDUPTHER

## 2020-10-01 RX ORDER — SODIUM CHLORIDE 9 MG/ML
INJECTION, SOLUTION INTRAVENOUS CONTINUOUS
Status: DISCONTINUED | OUTPATIENT
Start: 2020-10-01 | End: 2020-10-02 | Stop reason: HOSPADM

## 2020-10-01 RX ORDER — VITAMIN B COMPLEX
1000 TABLET ORAL DAILY
Status: DISCONTINUED | OUTPATIENT
Start: 2020-10-02 | End: 2020-10-02 | Stop reason: HOSPADM

## 2020-10-01 RX ORDER — PROMETHAZINE HYDROCHLORIDE 25 MG/ML
6.25 INJECTION, SOLUTION INTRAMUSCULAR; INTRAVENOUS
Status: DISCONTINUED | OUTPATIENT
Start: 2020-10-01 | End: 2020-10-01 | Stop reason: HOSPADM

## 2020-10-01 RX ORDER — ROSUVASTATIN CALCIUM 20 MG/1
20 TABLET, COATED ORAL DAILY
Status: DISCONTINUED | OUTPATIENT
Start: 2020-10-02 | End: 2020-10-02 | Stop reason: HOSPADM

## 2020-10-01 RX ORDER — MIDAZOLAM HYDROCHLORIDE 1 MG/ML
INJECTION INTRAMUSCULAR; INTRAVENOUS PRN
Status: DISCONTINUED | OUTPATIENT
Start: 2020-10-01 | End: 2020-10-01 | Stop reason: SDUPTHER

## 2020-10-01 RX ORDER — MORPHINE SULFATE 2 MG/ML
2 INJECTION, SOLUTION INTRAMUSCULAR; INTRAVENOUS
Status: DISCONTINUED | OUTPATIENT
Start: 2020-10-01 | End: 2020-10-02 | Stop reason: HOSPADM

## 2020-10-01 RX ORDER — OXYCODONE HYDROCHLORIDE 5 MG/1
10 TABLET ORAL PRN
Status: DISCONTINUED | OUTPATIENT
Start: 2020-10-01 | End: 2020-10-01 | Stop reason: HOSPADM

## 2020-10-01 RX ADMIN — FAMOTIDINE 20 MG: 10 INJECTION, SOLUTION INTRAVENOUS at 14:56

## 2020-10-01 RX ADMIN — PHENYLEPHRINE HYDROCHLORIDE 200 MCG: 10 INJECTION, SOLUTION INTRAMUSCULAR; INTRAVENOUS; SUBCUTANEOUS at 18:12

## 2020-10-01 RX ADMIN — SODIUM CHLORIDE, SODIUM LACTATE, POTASSIUM CHLORIDE, AND CALCIUM CHLORIDE: 600; 310; 30; 20 INJECTION, SOLUTION INTRAVENOUS at 15:23

## 2020-10-01 RX ADMIN — PROPOFOL 50 MG: 10 INJECTION, EMULSION INTRAVENOUS at 12:41

## 2020-10-01 RX ADMIN — LIDOCAINE HYDROCHLORIDE 60 MG: 20 INJECTION, SOLUTION INFILTRATION; PERINEURAL at 16:27

## 2020-10-01 RX ADMIN — SODIUM CHLORIDE: 9 INJECTION, SOLUTION INTRAVENOUS at 22:06

## 2020-10-01 RX ADMIN — ESMOLOL HYDROCHLORIDE 10 MG: 10 INJECTION, SOLUTION INTRAVENOUS at 16:27

## 2020-10-01 RX ADMIN — EPHEDRINE SULFATE 15 MG: 50 INJECTION INTRAVENOUS at 15:42

## 2020-10-01 RX ADMIN — PHENYLEPHRINE HYDROCHLORIDE 80 MCG: 10 INJECTION, SOLUTION INTRAMUSCULAR; INTRAVENOUS; SUBCUTANEOUS at 15:17

## 2020-10-01 RX ADMIN — DEXAMETHASONE SODIUM PHOSPHATE 8 MG: 10 INJECTION, SOLUTION INTRAMUSCULAR; INTRAVENOUS at 14:59

## 2020-10-01 RX ADMIN — ROPIVACAINE HYDROCHLORIDE 60 ML: 5 INJECTION, SOLUTION EPIDURAL; INFILTRATION; PERINEURAL at 12:45

## 2020-10-01 RX ADMIN — METOPROLOL TARTRATE 2 MG: 5 INJECTION, SOLUTION INTRAVENOUS at 16:43

## 2020-10-01 RX ADMIN — PROPOFOL 50 MG: 10 INJECTION, EMULSION INTRAVENOUS at 12:45

## 2020-10-01 RX ADMIN — SODIUM CHLORIDE, SODIUM LACTATE, POTASSIUM CHLORIDE, AND CALCIUM CHLORIDE: 600; 310; 30; 20 INJECTION, SOLUTION INTRAVENOUS at 16:30

## 2020-10-01 RX ADMIN — EPHEDRINE SULFATE 15 MG: 50 INJECTION INTRAVENOUS at 15:31

## 2020-10-01 RX ADMIN — PHENYLEPHRINE HYDROCHLORIDE 200 MCG: 10 INJECTION, SOLUTION INTRAMUSCULAR; INTRAVENOUS; SUBCUTANEOUS at 18:05

## 2020-10-01 RX ADMIN — LORAZEPAM 0.5 MG: 0.5 TABLET ORAL at 23:14

## 2020-10-01 RX ADMIN — HYDROMORPHONE HYDROCHLORIDE 1 MG: 2 INJECTION, SOLUTION INTRAMUSCULAR; INTRAVENOUS; SUBCUTANEOUS at 15:17

## 2020-10-01 RX ADMIN — HYDROMORPHONE HYDROCHLORIDE 0.4 MG: 2 INJECTION, SOLUTION INTRAMUSCULAR; INTRAVENOUS; SUBCUTANEOUS at 16:27

## 2020-10-01 RX ADMIN — METOPROLOL TARTRATE 2 MG: 5 INJECTION, SOLUTION INTRAVENOUS at 16:45

## 2020-10-01 RX ADMIN — PROPOFOL 100 MG: 10 INJECTION, EMULSION INTRAVENOUS at 14:59

## 2020-10-01 RX ADMIN — PHENYLEPHRINE HYDROCHLORIDE 200 MCG: 10 INJECTION, SOLUTION INTRAMUSCULAR; INTRAVENOUS; SUBCUTANEOUS at 18:01

## 2020-10-01 RX ADMIN — EPHEDRINE SULFATE 10 MG: 50 INJECTION INTRAVENOUS at 15:26

## 2020-10-01 RX ADMIN — ONDANSETRON 4 MG: 2 INJECTION INTRAMUSCULAR; INTRAVENOUS at 14:56

## 2020-10-01 RX ADMIN — PHENYLEPHRINE HYDROCHLORIDE 80 MCG: 10 INJECTION, SOLUTION INTRAMUSCULAR; INTRAVENOUS; SUBCUTANEOUS at 15:20

## 2020-10-01 RX ADMIN — SUCCINYLCHOLINE CHLORIDE 60 MG: 20 INJECTION, SOLUTION INTRAMUSCULAR; INTRAVENOUS; PARENTERAL at 16:27

## 2020-10-01 RX ADMIN — PHENYLEPHRINE HYDROCHLORIDE 80 MCG: 10 INJECTION, SOLUTION INTRAMUSCULAR; INTRAVENOUS; SUBCUTANEOUS at 15:14

## 2020-10-01 RX ADMIN — PHENYLEPHRINE HYDROCHLORIDE 30 MCG/MIN: 10 INJECTION, SOLUTION INTRAMUSCULAR; INTRAVENOUS; SUBCUTANEOUS at 16:34

## 2020-10-01 RX ADMIN — DEXAMETHASONE SODIUM PHOSPHATE 10 MG: 10 INJECTION, SOLUTION INTRAMUSCULAR; INTRAVENOUS at 12:45

## 2020-10-01 RX ADMIN — PROPOFOL 50 MG: 10 INJECTION, EMULSION INTRAVENOUS at 12:43

## 2020-10-01 RX ADMIN — PHENYLEPHRINE HYDROCHLORIDE 80 MCG: 10 INJECTION, SOLUTION INTRAMUSCULAR; INTRAVENOUS; SUBCUTANEOUS at 15:23

## 2020-10-01 RX ADMIN — SODIUM CHLORIDE, SODIUM LACTATE, POTASSIUM CHLORIDE, AND CALCIUM CHLORIDE: 600; 310; 30; 20 INJECTION, SOLUTION INTRAVENOUS at 11:29

## 2020-10-01 RX ADMIN — CEFAZOLIN SODIUM 2 G: 2 SOLUTION INTRAVENOUS at 23:14

## 2020-10-01 RX ADMIN — EPHEDRINE SULFATE 10 MG: 50 INJECTION INTRAVENOUS at 15:50

## 2020-10-01 RX ADMIN — ESMOLOL HYDROCHLORIDE 5 MG: 10 INJECTION, SOLUTION INTRAVENOUS at 16:25

## 2020-10-01 RX ADMIN — CEFAZOLIN 3 G: 10 INJECTION, POWDER, FOR SOLUTION INTRAVENOUS at 14:54

## 2020-10-01 RX ADMIN — ESMOLOL HYDROCHLORIDE 20 MG: 10 INJECTION INTRAVENOUS at 17:29

## 2020-10-01 RX ADMIN — MIDAZOLAM HYDROCHLORIDE 2 MG: 2 INJECTION, SOLUTION INTRAMUSCULAR; INTRAVENOUS at 14:56

## 2020-10-01 RX ADMIN — ROCURONIUM BROMIDE 40 MG: 10 INJECTION INTRAVENOUS at 16:41

## 2020-10-01 RX ADMIN — PROPOFOL 50 MG: 10 INJECTION, EMULSION INTRAVENOUS at 12:44

## 2020-10-01 ASSESSMENT — PULMONARY FUNCTION TESTS
PIF_VALUE: 19
PIF_VALUE: 4
PIF_VALUE: 19
PIF_VALUE: 19
PIF_VALUE: 7
PIF_VALUE: 18
PIF_VALUE: 12
PIF_VALUE: 19
PIF_VALUE: 23
PIF_VALUE: 2
PIF_VALUE: 11
PIF_VALUE: 10
PIF_VALUE: 18
PIF_VALUE: 30
PIF_VALUE: 20
PIF_VALUE: 31
PIF_VALUE: 19
PIF_VALUE: 11
PIF_VALUE: 19
PIF_VALUE: 19
PIF_VALUE: 4
PIF_VALUE: 4
PIF_VALUE: 19
PIF_VALUE: 0
PIF_VALUE: 19
PIF_VALUE: 7
PIF_VALUE: 19
PIF_VALUE: 10
PIF_VALUE: 19
PIF_VALUE: 12
PIF_VALUE: 19
PIF_VALUE: 11
PIF_VALUE: 11
PIF_VALUE: 1
PIF_VALUE: 4
PIF_VALUE: 10
PIF_VALUE: 19
PIF_VALUE: 1
PIF_VALUE: 19
PIF_VALUE: 19
PIF_VALUE: 12
PIF_VALUE: 10
PIF_VALUE: 11
PIF_VALUE: 11
PIF_VALUE: 19
PIF_VALUE: 11
PIF_VALUE: 5
PIF_VALUE: 19
PIF_VALUE: 11
PIF_VALUE: 18
PIF_VALUE: 11
PIF_VALUE: 3
PIF_VALUE: 19
PIF_VALUE: 11
PIF_VALUE: 19
PIF_VALUE: 12
PIF_VALUE: 18
PIF_VALUE: 19
PIF_VALUE: 19
PIF_VALUE: 10
PIF_VALUE: 4
PIF_VALUE: 11
PIF_VALUE: 19
PIF_VALUE: 18
PIF_VALUE: 10
PIF_VALUE: 10
PIF_VALUE: 1
PIF_VALUE: 20
PIF_VALUE: 10
PIF_VALUE: 10
PIF_VALUE: 19
PIF_VALUE: 11
PIF_VALUE: 11
PIF_VALUE: 4
PIF_VALUE: 20
PIF_VALUE: 19
PIF_VALUE: 11
PIF_VALUE: 20
PIF_VALUE: 11
PIF_VALUE: 10
PIF_VALUE: 19
PIF_VALUE: 11
PIF_VALUE: 10
PIF_VALUE: 19
PIF_VALUE: 19
PIF_VALUE: 12
PIF_VALUE: 19
PIF_VALUE: 19
PIF_VALUE: 11
PIF_VALUE: 4
PIF_VALUE: 1
PIF_VALUE: 9
PIF_VALUE: 10
PIF_VALUE: 19
PIF_VALUE: 11
PIF_VALUE: 19
PIF_VALUE: 4
PIF_VALUE: 19
PIF_VALUE: 10
PIF_VALUE: 10
PIF_VALUE: 19
PIF_VALUE: 11
PIF_VALUE: 5
PIF_VALUE: 6
PIF_VALUE: 19
PIF_VALUE: 18
PIF_VALUE: 1
PIF_VALUE: 4
PIF_VALUE: 19
PIF_VALUE: 0
PIF_VALUE: 10
PIF_VALUE: 19
PIF_VALUE: 19
PIF_VALUE: 11
PIF_VALUE: 11
PIF_VALUE: 10
PIF_VALUE: 11
PIF_VALUE: 11
PIF_VALUE: 19
PIF_VALUE: 19
PIF_VALUE: 1
PIF_VALUE: 19
PIF_VALUE: 12
PIF_VALUE: 19
PIF_VALUE: 10
PIF_VALUE: 11
PIF_VALUE: 11
PIF_VALUE: 19
PIF_VALUE: 4
PIF_VALUE: 2
PIF_VALUE: 21
PIF_VALUE: 11
PIF_VALUE: 18
PIF_VALUE: 19
PIF_VALUE: 22
PIF_VALUE: 0
PIF_VALUE: 18
PIF_VALUE: 4
PIF_VALUE: 11
PIF_VALUE: 11
PIF_VALUE: 19
PIF_VALUE: 18
PIF_VALUE: 11
PIF_VALUE: 19
PIF_VALUE: 10
PIF_VALUE: 10
PIF_VALUE: 19
PIF_VALUE: 10
PIF_VALUE: 12
PIF_VALUE: 11
PIF_VALUE: 11
PIF_VALUE: 3
PIF_VALUE: 19
PIF_VALUE: 12
PIF_VALUE: 21
PIF_VALUE: 20
PIF_VALUE: 19
PIF_VALUE: 11
PIF_VALUE: 11
PIF_VALUE: 12
PIF_VALUE: 10
PIF_VALUE: 3
PIF_VALUE: 10
PIF_VALUE: 11
PIF_VALUE: 4
PIF_VALUE: 10
PIF_VALUE: 18
PIF_VALUE: 19
PIF_VALUE: 6
PIF_VALUE: 11
PIF_VALUE: 3
PIF_VALUE: 10
PIF_VALUE: 18
PIF_VALUE: 12
PIF_VALUE: 19
PIF_VALUE: 4
PIF_VALUE: 19
PIF_VALUE: 19
PIF_VALUE: 20
PIF_VALUE: 10
PIF_VALUE: 11
PIF_VALUE: 19
PIF_VALUE: 4
PIF_VALUE: 19
PIF_VALUE: 18
PIF_VALUE: 19
PIF_VALUE: 4
PIF_VALUE: 19
PIF_VALUE: 11
PIF_VALUE: 19
PIF_VALUE: 35
PIF_VALUE: 25
PIF_VALUE: 10
PIF_VALUE: 19
PIF_VALUE: 10
PIF_VALUE: 19
PIF_VALUE: 21
PIF_VALUE: 4
PIF_VALUE: 11
PIF_VALUE: 19
PIF_VALUE: 4
PIF_VALUE: 11
PIF_VALUE: 10
PIF_VALUE: 5
PIF_VALUE: 19
PIF_VALUE: 3
PIF_VALUE: 4
PIF_VALUE: 11
PIF_VALUE: 21
PIF_VALUE: 10
PIF_VALUE: 0
PIF_VALUE: 10
PIF_VALUE: 10
PIF_VALUE: 12
PIF_VALUE: 19
PIF_VALUE: 19

## 2020-10-01 ASSESSMENT — LIFESTYLE VARIABLES: SMOKING_STATUS: 0

## 2020-10-01 ASSESSMENT — PAIN SCALES - GENERAL
PAINLEVEL_OUTOF10: 0

## 2020-10-01 ASSESSMENT — PAIN - FUNCTIONAL ASSESSMENT: PAIN_FUNCTIONAL_ASSESSMENT: 0-10

## 2020-10-01 NOTE — PROGRESS NOTES
Pt awake but denies pain. There is a delay in the start time of the procedure. Patient and wife Dioni Most informed of delay by Imelda Beal.

## 2020-10-01 NOTE — ANESTHESIA PROCEDURE NOTES
Peripheral Block    Patient location during procedure: pre-op  Staffing  Anesthesiologist: Melissa Brower DO  Preanesthetic Checklist  Completed: patient identified, site marked, surgical consent, pre-op evaluation, timeout performed, IV checked, risks and benefits discussed, monitors and equipment checked, anesthesia consent given, oxygen available and patient being monitored  Peripheral Block  Patient position: supine  Prep: ChloraPrep  Patient monitoring: cardiac monitor, continuous pulse ox, continuous capnometry, frequent blood pressure checks and IV access  Block type: Femoral  Laterality: left  Injection technique: single-shot  Procedures: ultrasound guided  Adductor canal  Needle  Needle gauge: 22 G  Needle localization: anatomical landmarks and ultrasound guidance  Assessment  Injection assessment: negative aspiration for heme, no paresthesia on injection and local visualized surrounding nerve on ultrasound  Paresthesia pain: none  Slow fractionated injection: yes  Hemodynamics: stable  Additional Notes  Following sciatic nerve block, adductor canal block completed. 20 mL 0.5% Ropivacaine + 5 mg PF decadron injected in 5 mL increments following negative aspiration. Tip of needle in view at all times. No pain or paresthesias on injection. Pt tolerated the procedure well.    Reason for block: procedure for pain, post-op pain management and at surgeon's request

## 2020-10-01 NOTE — H&P
Shawanda De    [id number]    [facility] Same Day Surgery Update H & P  Department of General Surgery   Surgical Service   Pre-operative History and Physical  Last H & P within the last 30 days. DIAGNOSIS:   Post-traumatic osteoarthritis of left ankle [M19.172]  Mechanical complication of internal orthopedic device, implant, and graft, sequela [T84.218S]    Procedure(s):  LEFT ANKLE REPLACEMENT, GASTROCNEMIUS RECESSION;  POSSIBLE  REMOVAL OF HARDWARE LEFT ANKLE     HISTORY OF PRESENT ILLNESS:   Patient with chronic left ankle pain, swelling and limited ROM in the setting of arthrosis. The symptoms have been recalcitrant to conservative treatment and the patient presents today for the above procedure. Covid 19:  Patient denies fever, chills, cough or known exposure to Covid-19. Patient reports they have been quarantined at home since Covid-19 test.      Past Medical History:        Diagnosis Date    Anxiety     BPH (benign prostatic hyperplasia) 5/31/2012    CAD (coronary artery disease)     Cancer ([facility] [address]     SKIN    Hyperlipidemia     LBBB (left bundle branch block) 5/31/2012    Primary insomnia 10/31/2019    Sleep apnea 2011    USE CPAP     Past Surgical History:        Procedure Laterality Date    ANKLE FRACTURE SURGERY      CARDIAC CATHETERIZATION  2006    MOHS SURGERY Right 03/11/2020    Right Mid Vertex Scalp     Past Social History:  Social History     Socioeconomic History    Marital status:      Spouse name: None    Number of children: None    Years of education: None    Highest education level: None   Occupational History    None   Social Needs    Financial resource strain: None    Food insecurity     Worry: None     Inability: None    Transportation needs     Medical: None     Non-medical: None   Tobacco Use    Smoking status: Never Smoker    Smokeless tobacco: Never Used   Substance and Sexual Activity    Alcohol use:  Yes     Alcohol/week: 1.7 standard drinks Types: 2 Standard drinks or equivalent per week     Comment: OCC    Drug use: Never    Sexual activity: None   Lifestyle    Physical activity     Days per week: None     Minutes per session: None    Stress: None   Relationships    Social connections     Talks on phone: None     Gets together: None     Attends Buddhism service: None     Active member of club or organization: None     Attends meetings of clubs or organizations: None     Relationship status: None    Intimate partner violence     Fear of current or ex partner: None     Emotionally abused: None     Physically abused: None     Forced sexual activity: None   Other Topics Concern    None   Social History Narrative    None         Medications Prior to Admission:      Prior to Admission medications    Medication Sig Start Date End Date Taking? Authorizing Provider   rosuvastatin (CRESTOR) 20 MG tablet TAKE 1 TABLET BY MOUTH  DAILY 8/18/20  Yes Onur Tilley MD   clopidogrel (PLAVIX) 75 MG tablet TAKE 1 TABLET BY MOUTH  DAILY 8/18/20  Yes Onur Tilley MD   omeprazole (PRILOSEC) 20 MG delayed release capsule TAKE 1 CAPSULE BY MOUTH  DAILY  Patient taking differently: Take 20 mg by mouth as needed  8/18/20  Yes Onur Tilley MD   fluorouracil (EFUDEX) 5 % cream Apply to the forehead, temples and sides of the cheeks 2 times daily for 14 days. 8/10/20  Yes Venu Manley MD   zolpidem (AMBIEN CR) 12.5 MG extended release tablet TAKE ONE TABLET BY MOUTH NIGHTLY AS NEEDED FOR SLEEP- KAMARI 7/28/20 10/27/20 Yes Onur Tilley MD   tamsulosin (FLOMAX) 0.4 MG capsule TAKE 1 CAPSULE BY MOUTH  EVERY DAY 6/8/20  Yes Onur Tilley MD   JUBLIA 10 % SOLN APPLY TO THE RIGHT BIG TONAIL AND 4TH TOENAIL ONCE A DAY AFTER BATHING 8/16/18  Yes Venu Manley MD   Vitamin D (CHOLECALCIFEROL) 1000 UNITS CAPS capsule Take 1,000 Units by mouth daily.    Yes Historical Provider, MD   aspirin 81 MG EC tablet Take 81 mg by mouth M,W,F only   Yes Historical Provider, MD Allergies:  Patient has no known allergies. PHYSICAL EXAM:      /73   Pulse 88   Temp 97.6 °F (36.4 °C) (Oral)   Resp 18   Ht 5' 8\" (1.727 m)   Wt 190 lb (86.2 kg)   SpO2 93%   BMI 28.89 kg/m²      Airway:  Airway patent with no audible stridor    Heart:  Regular rate and rhythm, No murmur noted    Lungs:  No increased work of breathing, good air exchange, clear to auscultation bilaterally, no crackles or wheezing    Abdomen:  Soft, non-distended, non-tender, normal active bowel sounds, no masses palpated    ASSESSMENT AND PLAN    Patient is a 69 y.o. male with above specified procedure planned. 1.  Patient seen and focused exam done today- no new changes since last physical exam on 9/16/20    2. Access to ancillary services are available per request of the provider.     SVETLANA Andujar - CNP     10/1/2020

## 2020-10-01 NOTE — PROGRESS NOTES
Personally attempted to locate patients wife in waiting room but was unable too, cell phone voicemail is not set up. Patient provided home phone number and wife answered phone. Patient update given, per wife Dr. Geoffrey Elder instructed her to go home as visiting hours are now over. Patient was able to speak to wife on phone. Wife is aware that patient is going to be admitted to  but room number has not been assigned as of yet.

## 2020-10-01 NOTE — ANESTHESIA PRE PROCEDURE
Department of Anesthesiology  Preprocedure Note       Name:  Lesia Saldivar   Age:  69 y.o.  :  1945                                          MRN:  [id number]         Date:  10/1/2020      Surgeon: Babatunde Ovalle):  Marie Dorsey MD    Procedure: Procedure(s):  LEFT ANKLE REPLACEMENT, GASTROCNEMIUS RECESSION;  POSSIBLE  REMOVAL OF HARDWARE LEFT ANKLE    Medications prior to admission:   Prior to Admission medications    Medication Sig Start Date End Date Taking? Authorizing Provider   rosuvastatin (CRESTOR) 20 MG tablet TAKE 1 TABLET BY MOUTH  DAILY 20  Yes Jose Zuleta MD   clopidogrel (PLAVIX) 75 MG tablet TAKE 1 TABLET BY MOUTH  DAILY 20  Yes Jose Zuleta MD   omeprazole (PRILOSEC) 20 MG delayed release capsule TAKE 1 CAPSULE BY MOUTH  DAILY  Patient taking differently: Take 20 mg by mouth as needed  20  Yes Jose Zuleta MD   fluorouracil (EFUDEX) 5 % cream Apply to the forehead, temples and sides of the cheeks 2 times daily for 14 days. 8/10/20  Yes Riley Espinoza MD   zolpidem (AMBIEN CR) 12.5 MG extended release tablet TAKE ONE TABLET BY MOUTH NIGHTLY AS NEEDED FOR SLEEP- KAMARI 7/28/20 10/27/20 Yes Jose Zuleta MD   tamsulosin (FLOMAX) 0.4 MG capsule TAKE 1 CAPSULE BY MOUTH  EVERY DAY 20  Yes Jose Zuleta MD   JUBLIA 10 % SOLN APPLY TO THE RIGHT BIG TONAIL AND 4TH TOENAIL ONCE A DAY AFTER BATHING 18  Yes Riley Espinoza MD   Vitamin D (CHOLECALCIFEROL) 1000 UNITS CAPS capsule Take 1,000 Units by mouth daily.    Yes Historical Provider, MD   aspirin 81 MG EC tablet Take 81 mg by mouth M,W,F only   Yes Historical Provider, MD       Current medications:    Current Facility-Administered Medications   Medication Dose Route Frequency Provider Last Rate Last Dose    lactated ringers infusion   Intravenous Continuous Marie Dorsey MD        ceFAZolin (ANCEF) 3 g in dextrose 5 % 100 mL IVPB  3 g Intravenous Once Marie Dorsey MD        lactated ringers 09/30/20                        Date of last solid food consumption: 09/30/20    BMI:   Wt Readings from Last 3 Encounters:   09/29/20 190 lb (86.2 kg)   09/09/20 194 lb (88 kg)   07/28/20 198 lb (89.8 kg)     Body mass index is 28.89 kg/m².     CBC:   Lab Results   Component Value Date    WBC 6.6 09/10/2020    RBC 4.74 09/10/2020    HGB 14.5 09/10/2020    HCT 43.4 09/10/2020    MCV 91.6 09/10/2020    RDW 13.1 09/10/2020     09/10/2020       CMP:   Lab Results   Component Value Date     09/10/2020    K 4.2 09/10/2020     09/10/2020    CO2 25 09/10/2020    BUN 20 09/10/2020    CREATININE 1.0 09/10/2020    GFRAA >60 09/10/2020    GFRAA >60 09/24/2012    AGRATIO 1.9 09/10/2020    LABGLOM >60 09/10/2020    GLUCOSE 125 09/10/2020    GLUCOSE 101 05/27/2011    PROT 6.7 09/10/2020    PROT 7.2 09/24/2012    CALCIUM 10.0 09/10/2020    BILITOT 0.4 09/10/2020    ALKPHOS 56 09/10/2020    AST 17 09/10/2020    ALT 14 09/10/2020       POC Tests:   Recent Labs     10/01/20  1140   POCGLU 107*       Coags: No results found for: PROTIME, INR, APTT    HCG (If Applicable): No results found for: PREGTESTUR, PREGSERUM, HCG, HCGQUANT     ABGs: No results found for: PHART, PO2ART, LQE7BPW, QLR8WWI, BEART, Z4PZAMDY     Type & Screen (If Applicable):  No results found for: LABABO, LABRH    Drug/Infectious Status (If Applicable):  No results found for: HIV, HEPCAB    COVID-19 Screening (If Applicable):   Lab Results   Component Value Date    COVID19 NOT DETECTED 09/25/2020         Anesthesia Evaluation  Patient summary reviewed and Nursing notes reviewed no history of anesthetic complications:   Airway: Mallampati: I  TM distance: >3 FB   Neck ROM: full  Mouth opening: > = 3 FB Dental: normal exam         Pulmonary:   (+) sleep apnea: on CPAP,      (-) not a current smoker                           Cardiovascular:    (+) CAD:, CABG/stent:,         Rhythm: regular  Rate: normal                 ROS comment: S/p stent placement 2006     Neuro/Psych:               GI/Hepatic/Renal:             Endo/Other:    (+) : arthritis: OA., .                 Abdominal:           Vascular:                                        Anesthesia Plan      general and regional     ASA 3     (Popliteal/saphenous nerve blocks PSR)  Induction: intravenous. MIPS: Prophylactic antiemetics administered. Anesthetic plan and risks discussed with patient. Plan discussed with CRNA.                   Karyn Case DO   10/1/2020

## 2020-10-01 NOTE — ANESTHESIA PROCEDURE NOTES
Peripheral Block    Patient location during procedure: pre-op  Staffing  Anesthesiologist: Latisha Dupree DO  Preanesthetic Checklist  Completed: patient identified, site marked, surgical consent, pre-op evaluation, timeout performed, IV checked, risks and benefits discussed, monitors and equipment checked, anesthesia consent given, oxygen available and patient being monitored  Peripheral Block  Patient position: supine  Prep: ChloraPrep  Patient monitoring: cardiac monitor, continuous pulse ox, continuous capnometry, frequent blood pressure checks and IV access  Block type: Sciatic  Laterality: left  Injection technique: single-shot  Procedures: ultrasound guided  Popliteal  Needle  Needle gauge: 22 G  Needle localization: anatomical landmarks and ultrasound guidance  Assessment  Injection assessment: negative aspiration for heme, no paresthesia on injection and local visualized surrounding nerve on ultrasound  Paresthesia pain: none  Slow fractionated injection: yes  Hemodynamics: stable  Additional Notes  Sterile prep. Timeout with SDS RN. 150 mg propofol administered in divided doses. 40 mL 0.5% Ropivacaine + 5 mg decadron injected in 5 mL increments following negative aspiration. Tip of needle in view at all times. No pain or paresthesias on injection. Pt tolerated the procedure well.    Reason for block: procedure for pain, post-op pain management and at surgeon's request

## 2020-10-01 NOTE — PROGRESS NOTES
Patient arrived to PACU post LEFT ANKLE REPLACEMENT, GASTROCNEMIUS RECESSION; - Left by Dr. Ash Jenkins. Patient is awake and alert on RA. Per report patient received medications for bp support intra op for HR and BP control. Patient has pre-existing LBBB, currently 111bpm. CRNA called Dr. Alona Higginbotham at bedside to make aware, no new orders at this time. Patient's gown and sheets are saturated d/t diaphoresis. Patient received peripheral block preop and does not have any sensation to LLE, cap refill intact. Will continue to monitor.

## 2020-10-01 NOTE — ANESTHESIA POSTPROCEDURE EVALUATION
Department of Anesthesiology  Postprocedure Note    Patient: Prakash Reilly  MRN: [id number]  [place]: 1945  Date of evaluation: 10/1/2020  Time:  7:35 PM     Procedure Summary     Date:  10/01/20 Room / Location:  [address] [place] [address] [address]  / [facility]    Anesthesia Start:  0856 Anesthesia Stop:  1846    Procedures:       LEFT ANKLE REPLACEMENT, GASTROCNEMIUS RECESSION; (Left Ankle)      REMOVAL OF HARDWARE LEFT ANKLE (Left Ankle) Diagnosis:       Post-traumatic osteoarthritis of left ankle      Mechanical complication of internal orthopedic device, implant, and graft, sequela      (Post-traumatic osteoarthritis of left ankle [Z12.077] Mechanical complication of internal orthopedic device, implant or graft, sequela [T84.498S])    Surgeon:  Sera Ferrara MD Responsible Provider:  Scott Cole DO    Anesthesia Type:  general, regional ASA Status:  3          Anesthesia Type: general, regional    Ursula Phase I: Ursula Score: 10    Ursula Phase II:      Last vitals: Reviewed and per EMR flowsheets.        Anesthesia Post Evaluation    Patient location during evaluation: PACU  Patient participation: complete - patient participated  Level of consciousness: awake and alert  Pain score: 0  Airway patency: patent  Nausea & Vomiting: no nausea and no vomiting  Complications: no  Cardiovascular status: hemodynamically stable  Respiratory status: acceptable  Hydration status: euvolemic

## 2020-10-01 NOTE — PROGRESS NOTES
Anesthesia Dr. Cecily Crespo here to do Left Adductor and Popliteal  block. O2 placed 2L N/C   Start time:1235  Stop time:1250  Tolerated Well. Pt unresponsive to tactile and verbal stimuli. Dr. Cecily Crespo aware. Will monitor. See flow sheet for vital signs.

## 2020-10-02 VITALS
TEMPERATURE: 98 F | BODY MASS INDEX: 28.79 KG/M2 | DIASTOLIC BLOOD PRESSURE: 64 MMHG | RESPIRATION RATE: 18 BRPM | HEIGHT: 68 IN | SYSTOLIC BLOOD PRESSURE: 125 MMHG | WEIGHT: 190 LBS | HEART RATE: 94 BPM | OXYGEN SATURATION: 94 %

## 2020-10-02 LAB — MRSA SCREEN RT-PCR: NORMAL

## 2020-10-02 PROCEDURE — 94150 VITAL CAPACITY TEST: CPT

## 2020-10-02 PROCEDURE — 6360000002 HC RX W HCPCS: Performed by: ORTHOPAEDIC SURGERY

## 2020-10-02 PROCEDURE — 6370000000 HC RX 637 (ALT 250 FOR IP): Performed by: ORTHOPAEDIC SURGERY

## 2020-10-02 RX ORDER — HYDROCODONE BITARTRATE AND ACETAMINOPHEN 5; 325 MG/1; MG/1
1-2 TABLET ORAL
Qty: 40 TABLET | Refills: 0 | Status: SHIPPED | OUTPATIENT
Start: 2020-10-02 | End: 2020-10-09

## 2020-10-02 RX ADMIN — PANTOPRAZOLE SODIUM 40 MG: 40 TABLET, DELAYED RELEASE ORAL at 06:26

## 2020-10-02 RX ADMIN — HYDROCODONE BITARTRATE AND ACETAMINOPHEN 1 TABLET: 5; 325 TABLET ORAL at 08:13

## 2020-10-02 RX ADMIN — ASPIRIN 81 MG: 81 TABLET, COATED ORAL at 08:12

## 2020-10-02 RX ADMIN — TAMSULOSIN HYDROCHLORIDE 0.4 MG: 0.4 CAPSULE ORAL at 08:12

## 2020-10-02 RX ADMIN — LOSARTAN POTASSIUM 50 MG: 25 TABLET, FILM COATED ORAL at 08:12

## 2020-10-02 RX ADMIN — ROSUVASTATIN CALCIUM 20 MG: 20 TABLET, COATED ORAL at 08:12

## 2020-10-02 RX ADMIN — CLOPIDOGREL BISULFATE 75 MG: 75 TABLET ORAL at 08:12

## 2020-10-02 RX ADMIN — CEFAZOLIN SODIUM 2 G: 2 SOLUTION INTRAVENOUS at 06:26

## 2020-10-02 RX ADMIN — HYDROCODONE BITARTRATE AND ACETAMINOPHEN 1 TABLET: 5; 325 TABLET ORAL at 03:04

## 2020-10-02 RX ADMIN — Medication 1000 UNITS: at 08:12

## 2020-10-02 ASSESSMENT — PAIN DESCRIPTION - PROGRESSION: CLINICAL_PROGRESSION: GRADUALLY WORSENING

## 2020-10-02 ASSESSMENT — PAIN SCALES - GENERAL
PAINLEVEL_OUTOF10: 4
PAINLEVEL_OUTOF10: 0
PAINLEVEL_OUTOF10: 5

## 2020-10-02 ASSESSMENT — PAIN DESCRIPTION - FREQUENCY: FREQUENCY: CONTINUOUS

## 2020-10-02 ASSESSMENT — PAIN DESCRIPTION - PAIN TYPE: TYPE: ACUTE PAIN

## 2020-10-02 ASSESSMENT — PAIN - FUNCTIONAL ASSESSMENT: PAIN_FUNCTIONAL_ASSESSMENT: ACTIVITIES ARE NOT PREVENTED

## 2020-10-02 ASSESSMENT — PAIN DESCRIPTION - ONSET: ONSET: ON-GOING

## 2020-10-02 ASSESSMENT — PAIN DESCRIPTION - LOCATION: LOCATION: LEG

## 2020-10-02 ASSESSMENT — PAIN DESCRIPTION - ORIENTATION: ORIENTATION: LEFT

## 2020-10-02 ASSESSMENT — PAIN DESCRIPTION - DESCRIPTORS: DESCRIPTORS: BURNING;DISCOMFORT

## 2020-10-02 NOTE — PROGRESS NOTES
4 Eyes Admission Assessment     I agree as the admission nurse that 2 RN's have performed a thorough Head to Toe Skin Assessment on the patient. ALL assessment sites listed below have been assessed on admission. Areas assessed by both nurses:   [x]   Head, Face, and Ears   [x]   Shoulders, Back, and Chest  [x]   Arms, Elbows, and Hands   [x]   Coccyx, Sacrum, and Ischium  [x]   Legs, Feet, and Heels        Does the Patient have Skin Breakdown?   No         Baudilio Prevention initiated:  NA   Wound Care Orders initiated:  NA      WOC nurse consulted for Pressure Injury (Stage 3,4, Unstageable, DTI, NWPT, and Complex wounds) or Baudilio score 18 or lower:  NA      Nurse 1 eSignature: Electronically signed by Linette Hill RN on 10/2/20 at [address] AM EDT    **SHARE this note so that the co-signing nurse is able to place an eSignature**    Nurse 2 eSignature: Electronically signed by Brenda Leahy RN on 10/2/20 at 5:39 AM EDT

## 2020-10-02 NOTE — PROGRESS NOTES
Ortho Progress Note    POD 1 s/p ankle replacement and gastrocnemius recession. He has no complaints this morning. He states that he practiced NWB prior to surgery and does not feel he needs therapy this morning. Dressings clean, dry and intact. Neurovascularly intact. Block still intact. Continue NWB on operative extremity. Resume home doses of Plavix and Aspirin for DVT prophylaxis. D/c to home today. Follow up in the office this morning for dressing change and cast application.

## 2020-10-02 NOTE — PLAN OF CARE
Problem: Falls - Risk of:  Goal: Will remain free from falls  Description: Will remain free from falls  10/2/2020 1520 by Desi Ojeda RN  Outcome: Ongoing  Note: Patient alert and oriented X4, non-skid socks on, bed in lowest position and locked, side rails up X2, call light and belongings within reach, bed alarm on for safety, and fall sign posted. Will continue to monitor. 10/2/2020 0748 by Desi Ojeda RN  Outcome: Ongoing  Note: Patient alert and oriented X4, non-skid socks on, bed in lowest position and locked, side rails up X2, call light and belongings within reach, bed alarm on for safety, and fall sign posted. Will continue to monitor.

## 2020-10-02 NOTE — PROGRESS NOTES
PACU Transfer Note    Vitals:    10/01/20 2157   BP: 121/66   Pulse: 90   Resp: 15   Temp: 97 °F (36.1 °C)   SpO2: 92%       In: 2465 [P.O.:240; I.V.:2225]  Out: 400 [Urine:300]    Pain assessment:  none  Pain Level: 0    Patient transported to room 5504 via house transport. Patient voided 300cc clear yellow urine prior to transport. Tolerating crackers and pudding, reporting no pain. Patient sent with all belongings including cell phone and c-pap machine.      10/1/2020 10:10 PM

## 2020-10-02 NOTE — CARE COORDINATION
worker;  or  CANNOT provide pharmacy voucher for patients co-pays  5. Patients can then  the prescription on their way out of the hospital at discharge, or pharmacy can deliver to the bedside if staff is available. (payment due at time of pick-up or delivery - cash, check, or card accepted)     Able to afford home medications/ co-pay costs: Yes    ADLS:  Current PT AM-PAC Score:   /24  Current OT AM-PAC Score:   /24      DISCHARGE Disposition: Home- No Services Needed    LOC at discharge: Not Applicable  MAKAYLA Completed: No    Notification completed in HENS/PAS?:  Not Applicable    IMM Completed:   Not Indicated    Transportation:  Transportation PLAN for discharge: family   Mode of Transport: Private Car  Reason for medical transport: Not Applicable  Name of [address] [place] [address],[facility] [address]: Not Applicable    Additional CM Notes: PT to DC home with no needs. The Plan for Transition of Care is related to the following treatment goals of Post-traumatic osteoarthritis of left ankle [M19.172]  Mechanical complication of internal orthopedic device, implant, and graft, sequela [T84.498S]  Arthritis of left ankle [M19.072]    The Patient and/or patient representative Sofy Valadez and his family were provided with a choice of provider and agrees with the discharge plan Yes    Freedom of choice list was provided with basic dialogue that supports the patient's individualized plan of care/goals and shares the quality data associated with the providers.  Yes    Care Transitions patient: No    Ton Larsen RN  [facility] [place]  Case Management Department  Ph: [phone]  Fax: [phone]

## 2020-10-02 NOTE — PLAN OF CARE
Problem: Falls - Risk of:  Goal: Will remain free from falls  Description: Will remain free from falls  Outcome: Ongoing  Note: Hourly rounding on patient for needs. Non-skid socks on, bed in lowest position and locked. Bedside table, personal belongs, and nurse call light within reach. Instructed patient to use call light for assistance. Bed alarm on. Floor clear of clutter. Patient remains free of falls at this time. Will continue to monitor. Problem: Pain:  Goal: Patient's pain/discomfort is manageable  Description: Patient's pain/discomfort is manageable  Outcome: Ongoing  Note: Patient denies pain at this time, patient exhibits no objective characteristics of pain, will continue to monitor. Problem: Infection - Surgical Site:  Goal: Will show no infection signs and symptoms  Description: Will show no infection signs and symptoms  Outcome: Ongoing  Note: Unable to assess surgical site due to dressing, dressing is clean/dry/intact. No odor or drainage noted. Patient remains afebrile at this time. Will continue to monitor.

## 2020-10-02 NOTE — PROGRESS NOTES
Patient admitted to room 5504 at [phone] from PACU. Patient is a/o x4. Patient denies complaints of nausea/pain. Non-skid socks on. Oriented patient to room and call light. Bed locked and in lowest positioned. Bedside table, personal belongings, and nurse call light within reach. Instructed patient to utilize call light for assistance. Bed alarm on. Will continue to monitor.

## 2020-10-02 NOTE — PLAN OF CARE
Problem: Falls - Risk of:  Goal: Will remain free from falls  Description: Will remain free from falls  10/2/2020 0748 by Irving Habermann, RN  Outcome: Ongoing  Note: Patient alert and oriented X4, non-skid socks on, bed in lowest position and locked, side rails up X2, call light and belongings within reach, bed alarm on for safety, and fall sign posted. Will continue to monitor.

## 2020-10-02 NOTE — DISCHARGE SUMMARY
[place] DISCHARGE SUMMARY         The patient was taken to the operating room on 10/1/2020 where the aforementioned procedure was preformed. The patient was taken to the post operative anesthesia recovery unit in stable condition. The patient was then transferred to the orthopaedic floor for post operative pain management and convalesce       The patient was followed medically in the hospital for the above surgical procedures performed and below medical issues during their hospital stay    Active Problems:    Arthritis of left ankle  Resolved Problems:    * No resolved hospital problems. *         (x )The patient was placed on anticoagulation therapy for DVT prophylaxis -- resume home doses of Plavix and ASA. The patient was discharged in stable condition on 10/2/2020. Please see medical reconciliation for discharge medications. The discharge instructions were explained to the patient and the family. The patient will follow up in the office at his scheduled post-op appointments.

## 2020-10-02 NOTE — FLOWSHEET NOTE
Dr. Juani Rodriguez at bedside to see patient, HR currently 89-96bpm. Patient resting comfortably in bed, continues to deny pain to LLE, tolerating ice chips and declining anything else to drink. Awaiting bed assignment on 5T.

## 2020-10-02 NOTE — OP NOTE
[facility] [address], [address]                                OPERATIVE REPORT    PATIENT NAME: Kailee Costa                    :        1945  MED REC NO:   [id number]                          ROOM:       9068  ACCOUNT NO:   [de-identified]                           ADMIT DATE: 10/01/2020  PROVIDER:     Marya Cannon MD    DATE OF PROCEDURE:  10/01/2020    SURGEON:  Marya Cannon MD    SECOND SURGEON:  Aparna Ventura PA-C    PREOPERATIVE DIAGNOSES:  1. Left ankle arthritis. 2.  Left equinus contracture. POSTOPERATIVE DIAGNOSES:  1. Left ankle arthritis. 2.  Left equinus contracture. OPERATIONS:  1. Left total ankle replacement using Lexus Talaris size 1 tibia, 13 mm  polyethylene, size 1 talus (flat-top). 2.  Gastrocnemius recession. 3.  Hardware removal.    ANESTHETIC:  General.    INDICATIONS:  This is a 81-year-old gentleman with history of  significant pain and discomfort in his left ankle. The patient has  continued to have intractable pain despite conservative management. The  risks and potential benefits of the procedures were discussed with the  patient. He understands these, was given the opportunity to ask  questions. His questions were answered to his satisfaction. He has  given consent to proceed with above outlined procedures. OPERATIVE PROCEDURE:  The patient was brought to the operating room,  placed in the supine position on the operating table. After induction  of general anesthetic, a pneumatic tourniquet was placed on the  patient's left proximal thigh and set to 350 mmHg. The left leg was  then prepped and draped free in the usual sterile fashion. A second surgeon was necessary throughout the procedure to aid with  appropriate dissection of neurologic and vascular structures.   In  addition, the patient had had previous surgery including previous  operative repair of his flexion. Excellent varus and valgus stability was noted. The wounds were again  irrigated with a sterile pulsatile lavage. The tourniquet was released  and meticulous hemostasis obtained. The wound then closed in layers. The extensor retinaculum was reapproximated with 2-0 PDS suture. The  subcutaneous tissue reapproximated with 3-0 Vicryl suture. The skin  edges reapproximated with interrupted 3-0 nylon. There were no drains and no complications. Sponge and needle counts  were noted be correct at the end of the procedure by the nursing staff. Estimated blood loss approximately 100 mL.         Dary Cole MD    D: 10/02/2020 8:43:45       T: 10/02/2020 8:53:31     [email or web address]  Job#: [id number]     Doc#: [id number]    CC:

## 2020-10-26 RX ORDER — LORAZEPAM 0.5 MG/1
TABLET ORAL
Qty: 270 TABLET | Refills: 0 | Status: SHIPPED | OUTPATIENT
Start: 2020-10-26 | End: 2020-11-17

## 2020-10-26 RX ORDER — LORAZEPAM 0.5 MG/1
TABLET ORAL
Qty: 270 TABLET | Refills: 0 | Status: SHIPPED | OUTPATIENT
Start: 2020-10-26 | End: 2020-10-26 | Stop reason: SDUPTHER

## 2020-10-26 RX ORDER — ZOLPIDEM TARTRATE 12.5 MG/1
TABLET, FILM COATED, EXTENDED RELEASE ORAL
Qty: 90 TABLET | Refills: 0 | Status: SHIPPED | OUTPATIENT
Start: 2020-10-26 | End: 2021-02-03 | Stop reason: SDUPTHER

## 2020-10-26 NOTE — TELEPHONE ENCOUNTER
Patient requesting medication refill for Lorazepam sent to [facility]. He is here waiting for Ambien CR 12.5 mg to be printed out, states Dr. Bella Marie said this could not be sent to the pharmacy. Please Advise.

## 2020-10-27 RX ORDER — LORAZEPAM 0.5 MG/1
TABLET ORAL
Qty: 270 TABLET | Refills: 0 | Status: SHIPPED | OUTPATIENT
Start: 2020-10-27 | End: 2021-01-25 | Stop reason: SDUPTHER

## 2020-10-28 RX ORDER — ZOLPIDEM TARTRATE 12.5 MG/1
TABLET, FILM COATED, EXTENDED RELEASE ORAL
Qty: 90 TABLET | Refills: 0 | OUTPATIENT
Start: 2020-10-28

## 2020-11-17 ENCOUNTER — OFFICE VISIT (OUTPATIENT)
Dept: DERMATOLOGY | Age: 75
End: 2020-11-17
Payer: MEDICARE

## 2020-11-17 VITALS — TEMPERATURE: 97.3 F

## 2020-11-17 PROCEDURE — 4040F PNEUMOC VAC/ADMIN/RCVD: CPT | Performed by: DERMATOLOGY

## 2020-11-17 PROCEDURE — 17000 DESTRUCT PREMALG LESION: CPT | Performed by: DERMATOLOGY

## 2020-11-17 PROCEDURE — G8484 FLU IMMUNIZE NO ADMIN: HCPCS | Performed by: DERMATOLOGY

## 2020-11-17 PROCEDURE — 11102 TANGNTL BX SKIN SINGLE LES: CPT | Performed by: DERMATOLOGY

## 2020-11-17 PROCEDURE — G8427 DOCREV CUR MEDS BY ELIG CLIN: HCPCS | Performed by: DERMATOLOGY

## 2020-11-17 PROCEDURE — 17260 DSTRJ MAL LES T/A/L 0.5 CM/<: CPT | Performed by: DERMATOLOGY

## 2020-11-17 PROCEDURE — 3017F COLORECTAL CA SCREEN DOC REV: CPT | Performed by: DERMATOLOGY

## 2020-11-17 PROCEDURE — 1036F TOBACCO NON-USER: CPT | Performed by: DERMATOLOGY

## 2020-11-17 PROCEDURE — G8417 CALC BMI ABV UP PARAM F/U: HCPCS | Performed by: DERMATOLOGY

## 2020-11-17 PROCEDURE — 99213 OFFICE O/P EST LOW 20 MIN: CPT | Performed by: DERMATOLOGY

## 2020-11-17 PROCEDURE — 1123F ACP DISCUSS/DSCN MKR DOCD: CPT | Performed by: DERMATOLOGY

## 2020-11-17 PROCEDURE — 17003 DESTRUCT PREMALG LES 2-14: CPT | Performed by: DERMATOLOGY

## 2020-11-17 NOTE — PROGRESS NOTES
[facility]  Holly Yousif MD  [phone]      Russ Juarez  1945    69 y.o. male     Date of Visit: 11/17/2020    Chief Complaint: skin lesions    History of Present Illness:    1. He complains of few persistent scaly lesions on the scalp and face. He's used Efudex on and off with improvement. 2.  Unknown duration of an asymptomatic lesion on the right shoulder. 3.  Has hx of multiple BCCs - denies any signs of recurrence. Nodular BCC of the right mid vertex scalp-treated with Mohs by Dr. Flavio Ma on 3/11/2020. Small nodular BCCs on the right anterior upper arm and right mid arm-treated with curettage at the time of biopsy on 2/26/2019. Nodular BCC on the left upper chest history with curettage at the time of biopsy in October 2018. Superficial BCC of the right supraclavicular region-treated with curettage at time of biopsy on 1/3/2018.     Nodular BCC on the right thigh - excised on 6/23/17.    Superficial BCC on the right central upper back - treated with curettage on 3/28/17. Nodular BCC on the left central lower back - treated with curettage on 3/28/17. Nodular BCC on the right mid thigh-treated with curettage on 10/7/2016. Superficial basal cell carcinoma left upper chest-treated with curettage on 9/21/2016. Nodular BCC of the right mid central back-treated with curettage on 9/21/2016.     Superficial BCC of the left central lower back-treated with curettage on 3/3/2016.     4 BCCs treated with ED&C on 9/2015.    Nodular BCC on the right lateral shoulder, superior  Nodular basal cell carcinoma on the right lateral shoulder, inferior  Superficial basal cell carcinoma of the right upper back  Superficial basal cell carcinoma of the central lower back      Bowen's disease of the left temple-treated with Efudex cream b.i.d. for 4 weeks.    Superficial BCC, right posterior lateral arm-treated with curettage 3/7/14. BCC, right upper arm-excised 3/7/2014.   [facility] on the nasal dorsum (biopsy 10/2013) status post Mohs micrographic surgery in December 2013. Interval PMHx:  Had left ankle replacement. Review of Systems:  Skin: No new or changing moles. Past Medical History, Family History, Surgical History, Medications and Allergies reviewed. Past Medical History:   Diagnosis Date    Anxiety     BPH (benign prostatic hyperplasia) 5/31/2012    CAD (coronary artery disease)     Cancer ([facility] [address]     SKIN    Hyperlipidemia     LBBB (left bundle branch block) 5/31/2012    Primary insomnia 10/31/2019    Sleep apnea 2011    USE CPAP     Past Surgical History:   Procedure Laterality Date    ANKLE FRACTURE SURGERY      ANKLE SURGERY Left 10/1/2020    REMOVAL OF HARDWARE LEFT ANKLE performed by Mary Andres MD at [address]  2006    MOHS SURGERY Right 03/11/2020    Right Mid Vertex Scalp    TOTAL ANKLE ARTHROPLASTY Left 10/1/2020    LEFT ANKLE REPLACEMENT, GASTROCNEMIUS RECESSION; performed by Mary Andres MD at [address]       No Known Allergies  Outpatient Medications Marked as Taking for the 11/17/20 encounter (Office Visit) with Sangita Mathis MD   Medication Sig Dispense Refill    LORazepam (ATIVAN) 0.5 MG tablet TAKE 1 TABLET BY MOUTH EVERY 8 HOURS AS NEEDED 270 tablet 0    zolpidem (AMBIEN CR) 12.5 MG extended release tablet TAKE ONE TABLET BY MOUTH NIGHTLY AS NEEDED FOR SLEEP- KAMARI 90 tablet 0    olmesartan (BENICAR) 20 MG tablet Take 1 tablet by mouth      rosuvastatin (CRESTOR) 20 MG tablet TAKE 1 TABLET BY MOUTH  DAILY 90 tablet 3    clopidogrel (PLAVIX) 75 MG tablet TAKE 1 TABLET BY MOUTH  DAILY 90 tablet 3    omeprazole (PRILOSEC) 20 MG delayed release capsule TAKE 1 CAPSULE BY MOUTH  DAILY (Patient taking differently: Take 20 mg by mouth as needed ) 90 capsule 3    fluorouracil (EFUDEX) 5 % cream Apply to the forehead, temples and sides of the cheeks 2 times daily for 14 days.  40 g 0    tamsulosin (FLOMAX) 0.4 MG capsule TAKE 1 CAPSULE BY MOUTH  EVERY DAY 90 capsule 2    JUBLIA 10 % SOLN APPLY TO THE RIGHT BIG TONAIL AND 4TH TOENAIL ONCE A DAY AFTER BATHING 4 mL 0    Vitamin D (CHOLECALCIFEROL) 1000 UNITS CAPS capsule Take 1,000 Units by mouth daily.  aspirin 81 MG EC tablet Take 81 mg by mouth M,W,F only           Physical Examination       The following were examined and determined to be normal: Psych/Neuro, Conjunctivae/eyelids, Gums/teeth/lips, Neck, Breast/axilla/chest, Abdomen, Back and LUE. The following were examined and determined to be abnormal: Scalp/hair, Head/face and RUE. Well-appearing. 1.  Crown of scalp-5 keratotic erythematous macules. Upper aspect of the forehead and left lower cheek with several ill-defined scaly pink macules and patches. 2.  Right superior shoulder with a 3 mm telangiectatic pink-brown papule. 3.  Right forehead with an ill-defined telangiectatic pearly pink papule. Assessment and Plan     1. Actinic keratoses - multiple    2 cycles of liquid nitrogen applied to 5 AKs on the crown of the scalp. Patient was educated regarding the potential risks of blister formation, discomfort, hypopigmentation, and scar. Wound care was discussed. Efudex cream twice daily to the forehead and left cheek for 14 days. We discussed the likely side effects of treatment including redness, crusting, itching and burning. 2. Small nodular BCC (basal cell carcinoma), shoulder, right - 3 mm    Discussed likely diagnosis; patient agreeable to biopsy and curettage (verbal consent obtained). The area(s) to be biopsied were marked with a surgical pen. Alcohol was used to cleanse the site. Local anesthesia was acheived with 1% lidocaine with epinephrine. Shave biopsy was performed using a razor blade followed by sharp curettage in multiple directions. Hemostasis was achieved with aluminum chloride.  The wound(s) were dressed with petrolatum and covered with a bandage. Wound care instructions were reviewed. 1 Specimen (s) sent to pathology. The specimen bottles were appropriately labeled. We also reviewed the risks of bleeding, scar, and infection. 3. Neoplasm of uncertain behavior of skin, right forehead-rule out [address] [facility] [facility]    Discussed possible diagnosis; patient agreeable to biopsy (verbal consent obtained). The area(s) to be biopsied were marked with a surgical pen. Alcohol was used to cleanse the site. Local anesthesia was acheived with 1% lidocaine with epinephrine. Shave biopsy was performed using a razor blade. Hemostasis was achieved with aluminum chloride. The wound(s) were dressed with petrolatum and covered with a bandage. Wound care instructions were reviewed. 1 Specimen (s) sent to pathology. The specimen bottles were appropriately labeled. We also reviewed the risks of bleeding, scar, and infection. Return in about 3 months (around 2/17/2021).     --Era Carpenter MD

## 2020-11-17 NOTE — PATIENT INSTRUCTIONS

## 2020-11-19 LAB — DERMATOLOGY PATHOLOGY REPORT: ABNORMAL

## 2021-01-12 RX ORDER — TAMSULOSIN HYDROCHLORIDE 0.4 MG/1
CAPSULE ORAL
Qty: 90 CAPSULE | Refills: 3 | Status: SHIPPED | OUTPATIENT
Start: 2021-01-12 | End: 2021-12-08

## 2021-01-25 DIAGNOSIS — F51.01 PRIMARY INSOMNIA: ICD-10-CM

## 2021-01-27 ENCOUNTER — PROCEDURE VISIT (OUTPATIENT)
Dept: SURGERY | Age: 76
End: 2021-01-27
Payer: MEDICARE

## 2021-01-27 VITALS — HEART RATE: 81 BPM | SYSTOLIC BLOOD PRESSURE: 144 MMHG | TEMPERATURE: 97.7 F | DIASTOLIC BLOOD PRESSURE: 89 MMHG

## 2021-01-27 DIAGNOSIS — C44.319 BASAL CELL CARCINOMA OF RIGHT FOREHEAD: Primary | ICD-10-CM

## 2021-01-27 PROCEDURE — 17312 MOHS ADDL STAGE: CPT | Performed by: DERMATOLOGY

## 2021-01-27 PROCEDURE — 12052 INTMD RPR FACE/MM 2.6-5.0 CM: CPT | Performed by: DERMATOLOGY

## 2021-01-27 PROCEDURE — 17311 MOHS 1 STAGE H/N/HF/G: CPT | Performed by: DERMATOLOGY

## 2021-01-27 NOTE — PROGRESS NOTES
was excised with a narrow margin of normal-appearing skin, using the technique of Mohs. A map was prepared to correspond to the area of skin from which it was excised. Hemostasis was achieved using electrosurgery. The wound was bandaged. The tissue was prepared for the cryostat and sectioned. 1 section(s) prepared. Each section was coded, cut, and stained for microscopic examination. The entire base and margins of the excised piece of tissue were examined by the surgeon. The tissue was examined to the level of subcut fat. Stage I:  [facility] with focal squamoid differentiation in the dermis. The remaining tumor was noted and the next stage was performed. Stage II:  A thin layer of tissue was removed at the histologically-identified sites of remaining tumor. The entire procedure as described in stage I was repeated to process the tissue according to Mohs technique. 1 section(s) prepared for stage II. No tumor was identified at the peripheral margins of stage II of microscopically controlled surgery. DEFECT MANAGEMENT:    REPAIR DESCRIPTION:  Various closure modalities were discussed with the patient, and it was decided that an intermediate layered repair would best preserve normal anatomic and functional relationships. Additional risk of wound dehiscence was discussed. The area was anesthetized with 1% lidocaine with epinephrine 1:100,000 buffered, was given a sterile prep using Chlorhexidine gluconate 4% solution and draped in the usual sterile fashion. Recreation and enlargement of the wound was performed by excising cones of tissue via the triangulation technique. The final incision lines were placed with respect for the patient's natural skin tension lines in a linear configuration to avoid functional and aesthetic distortion of adjacent free margins. Following minimal undermining, meticulous hemostasis was obtained with spot monopolar electrocoagulation. Subcutaneous dead space and dermis were closed using 5-0 Vicryl buried subcutaneous interrupted suture and the epidermis was approximated with 5-0 Fast absorbing gut running epidermal sutures. WOUND COVERAGE:  The wound was cleaned with normal saline solution, dried off, Aquaphor ointment was applied, and the wound was covered. A dressing was applied for stabilization and light pressure. The patient was given detailed oral and written instructions on postoperative care. There were no complications. The patient left the Unit in good medical condition. FOLLOW-UP:  As dissolving sutures were placed, the patient was asked to return if any questions or concerns arose, but otherwise will return to see general dermatology per their instructions.

## 2021-01-27 NOTE — PATIENT INSTRUCTIONS
[facility] Office Hours:    Monday-Thursday  7:30 AM-4:30 PM    Friday  9:00 AM-1:00 PM    POST-OPERATIVE CARE FOR DISSOLVING STICHES  Bandage change after 48 hours    During your procedure today, dissolving sutures, or stiches, were used to close the wound. You do not have to have stiches removed. They will dissolve (melt away) on their own. Please follow these instructions to help you recover from your procedure and help your wound heal.    CARING FOR YOUR SURGICAL SITE  The bandage should remain on and completely dry for 48 hours. Do NOT get the bandage wet. 1. After the first 48 hours, gently remove the remaining part of the bandage. It can be helpful to moisten the bandage edges in the shower. Steri strips may still be on the wound. It is ok, they will fall off slowly with the daily bandage changes. 2. Gently clean AROUND the wound daily with mild soap and water. Please avoid the area from getting wet. The more moisture is on the sutures the quicker they will dissolve. 3. Dry (pat) the area with a clean Q-tip or gauze. Try to clean off any debris or crust from the area. 4. Apply a layer of Vaseline/ Aquaphor (or Bacitracin if your doctor recommends) to the wound area only. 5. Cut a piece of Telfa (or any non-stick dressing) to fit just over the wound and secure it with paper tape. If the wound is small you may use a Band- Aid. Keep area covered for a total of 1 week(s). If the dressing comes off or if you have questions, or concerns about the dressing, please call the office for instructions! POST OPERATIVE INSTRUCTIONS    1. Activity: Do not lift anything heavier than a gallon of milk for 1 week. Also, avoid strenuous activity such as running, power walking or contact sports. 2. Eating and drinking: Do not drink alcohol for 48 hours after your procedure. Alcohol increases the chances of bleeding.   3. Medicines   -If you have discomfort, take Acetaminophen (Tylenol or Extra Strength Tylenol). Follow the instructions and warning on the bottle. -If your doctor has prescribed you an Aspirin daily, please keep taking it. Do not take extra Aspirin or medicines containing Aspirin (such as Emi[place] and Excedrin) for 48 hours after your procedure. Bleeding: If bleeding occurs, DO NOT remove the bandage. Put firm pressure on the area with gauze for 20 minutes without peeking. If the bleeding continues, apply pressure for another 20 minutes. If the bleeding does not stop after you apply pressure, call us right away. If you can not call, go to the nearest emergency room or urgent care facility. What to expect:  You may have these symptoms. They are normal and should get better with time:  1. Swelling. Swelling usually increases for the first 48 hours after your procedure and then begins to improve. Some soreness and redness around your wound. If we worked close to your eyes (forehead, nose, temple, or upper cheeks) your eyes may become swollen and/ or black and blue. 2. Bruising, which could last 1 week or more. 3. Pink and bumpy appearance to the scar. This may happen a few weeks after your procedure. After 4 weeks, you may gently massage the area each day with facial moisturizer or petroleum jelly (Vaseline or Aquaphor). This will help to smooth the skin and improve the appearance of the scar. The color of your scar will fade over time, but it may be pink for several months after the procedure. The scar may take 6 months to 1 year to reach its final color and appearance. 4. \"Spitting\" suture. Occasionally, an inside suture (stitch) does not completely dissolve. When this happens, (generally 4-8 weeks after surgery), it causes a bump or \"pimple\" to form on the scar. This is easily removed and is not at all serious. It does not mean the skin cancer has returned. Contact us if it happens, but do not be alarmed. Vitamin E oil is NOT necessary.  A good moisturizer is just as effective. Sunscreen IS necessary. Use at least and SPF 30 sunscreen daily- even in winter    Call us at [phone] right away if you have any of the following symptoms:  -Bleeding that you can not stop (see highlighted area above). -Pain that lasts longer than 48 hours.  -Your wound becomes more painful, red or hot. -Bruising and swelling that does not begin to improve within the 48 hours or gets worse suddenly.

## 2021-01-28 ENCOUNTER — TELEPHONE (OUTPATIENT)
Dept: SURGERY | Age: 76
End: 2021-01-28

## 2021-01-28 RX ORDER — LORAZEPAM 0.5 MG/1
0.5 TABLET ORAL EVERY 8 HOURS PRN
Qty: 270 TABLET | Refills: 0 | Status: SHIPPED | OUTPATIENT
Start: 2021-01-28 | End: 2021-06-02 | Stop reason: SDUPTHER

## 2021-01-28 NOTE — TELEPHONE ENCOUNTER
The patient was in the office on 1/27/2021 for Mohs located on the RT forehead with ILC repair. The patient tolerated the procedure well and left the office in good condition. A post-operative telephone call was placed at 12:34p, 1/28/2021, in order to check on the patient's recovery process. The patient was not able to be reached and a phone message was left. Left message to call w/any questions/concerns.

## 2021-02-03 ENCOUNTER — OFFICE VISIT (OUTPATIENT)
Dept: INTERNAL MEDICINE CLINIC | Age: 76
End: 2021-02-03
Payer: MEDICARE

## 2021-02-03 VITALS
BODY MASS INDEX: 30.46 KG/M2 | DIASTOLIC BLOOD PRESSURE: 94 MMHG | WEIGHT: 201 LBS | TEMPERATURE: 97.7 F | SYSTOLIC BLOOD PRESSURE: 142 MMHG | HEIGHT: 68 IN

## 2021-02-03 DIAGNOSIS — G47.33 OBSTRUCTIVE SLEEP APNEA SYNDROME: Primary | ICD-10-CM

## 2021-02-03 DIAGNOSIS — F51.01 PRIMARY INSOMNIA: ICD-10-CM

## 2021-02-03 PROCEDURE — 99213 OFFICE O/P EST LOW 20 MIN: CPT | Performed by: INTERNAL MEDICINE

## 2021-02-03 PROCEDURE — 4040F PNEUMOC VAC/ADMIN/RCVD: CPT | Performed by: INTERNAL MEDICINE

## 2021-02-03 PROCEDURE — G8484 FLU IMMUNIZE NO ADMIN: HCPCS | Performed by: INTERNAL MEDICINE

## 2021-02-03 PROCEDURE — 1036F TOBACCO NON-USER: CPT | Performed by: INTERNAL MEDICINE

## 2021-02-03 PROCEDURE — 1123F ACP DISCUSS/DSCN MKR DOCD: CPT | Performed by: INTERNAL MEDICINE

## 2021-02-03 PROCEDURE — 3017F COLORECTAL CA SCREEN DOC REV: CPT | Performed by: INTERNAL MEDICINE

## 2021-02-03 PROCEDURE — G8427 DOCREV CUR MEDS BY ELIG CLIN: HCPCS | Performed by: INTERNAL MEDICINE

## 2021-02-03 PROCEDURE — G8417 CALC BMI ABV UP PARAM F/U: HCPCS | Performed by: INTERNAL MEDICINE

## 2021-02-03 RX ORDER — ZOLPIDEM TARTRATE 12.5 MG/1
TABLET, FILM COATED, EXTENDED RELEASE ORAL
Qty: 90 TABLET | Refills: 0 | Status: SHIPPED | OUTPATIENT
Start: 2021-02-03 | End: 2021-05-03 | Stop reason: SDUPTHER

## 2021-02-03 ASSESSMENT — PATIENT HEALTH QUESTIONNAIRE - PHQ9
1. LITTLE INTEREST OR PLEASURE IN DOING THINGS: 0
SUM OF ALL RESPONSES TO PHQ QUESTIONS 1-9: 0
SUM OF ALL RESPONSES TO PHQ QUESTIONS 1-9: 0
SUM OF ALL RESPONSES TO PHQ9 QUESTIONS 1 & 2: 0

## 2021-02-03 NOTE — PROGRESS NOTES
CHIEF COMPLAINT: Dalhia Hill is a 70 y.o. male who presents for : Follow-up insomnia    HPI: Patient presented with follow-up of insomnia he has been doing okay with the Ambien but has been off of it for about 10 days with some sleep disturbance he is trying to wean himself off is much as possible he also notes that his CPAP machine has caused indentation in hisperioral area and therefore he wants to see the sleep doctor again    Review of Systems:   Constitutional:  Denies fever or chills   Eyes:  Denies change in visual acuity   HENT:  Denies nasal congestion or sore throat   Respiratory:  Denies cough or shortness of breath   Cardiovascular:  Denies chest pain or edema   GI:  Denies abdominal pain, nausea, vomiting, bloody stools or diarrhea   :  Denies dysuria   Musculoskeletal:  Denies back pain or joint pain   Integument:  Denies rash   Neurologic:  Denies headache, focal weakness or sensory changes   Endocrine:  Denies polyuria or polydipsia   Lymphatic:  Denies swollen glands   Psychiatric:  Denies depression or anxiety     Past Medical History:        Diagnosis Date    Anxiety     BPH (benign prostatic hyperplasia) 5/31/2012    CAD (coronary artery disease)     Cancer ([address]     SKIN    Hyperlipidemia     LBBB (left bundle branch block) 5/31/2012    Primary insomnia 10/31/2019    Sleep apnea 2011    USE CPAP       Past Surgical History:        Procedure Laterality Date    ANKLE FRACTURE SURGERY      ANKLE SURGERY Left 10/1/2020    REMOVAL OF HARDWARE LEFT ANKLE performed by Freda Chaudhry MD at [address] [facility] Right 03/11/2020    Right Mid Vertex Scalp    MOHS SURGERY  01/27/2021    R Forehead    TOTAL ANKLE ARTHROPLASTY Left 10/1/2020    LEFT ANKLE REPLACEMENT, GASTROCNEMIUS RECESSION; performed by Freda Chaudhry MD at [facility]       Family History:  Family History   Problem Relation Age of Onset    Cancer Mother     Heart Disease Father     Thyroid Disease Brother        Social History:  Social History     Socioeconomic History    Marital status:      Spouse name: Not on file    Number of children: Not on file    Years of education: Not on file    Highest education level: Not on file   Occupational History    Not on file   Social Needs    Financial resource strain: Not on file    Food insecurity     Worry: Not on file     Inability: Not on file   [employer] needs     Medical: Not on file     Non-medical: Not on file   Tobacco Use    Smoking status: Never Smoker    Smokeless tobacco: Never Used   Substance and Sexual Activity    Alcohol use: Yes     Alcohol/week: 1.7 standard drinks     Types: 2 Standard drinks or equivalent per week     Comment: OCC    Drug use: Never    Sexual activity: Not on file   Lifestyle    Physical activity     Days per week: Not on file     Minutes per session: Not on file    Stress: Not on file   Relationships    Social connections     Talks on phone: Not on file     Gets together: Not on file     Attends [employer] service: Not on file     Active member of club or organization: Not on file     Attends meetings of clubs or organizations: Not on file     Relationship status: Not on file    Intimate partner violence     Fear of current or ex partner: Not on file     Emotionally abused: Not on file     Physically abused: Not on file     Forced sexual activity: Not on file   Other Topics Concern    Not on file   Social History Narrative    Not on file         Allergies:  Patient has no known allergies. Current Medications:    Prior to Admission medications    Medication Sig Start Date End Date Taking? Authorizing Provider   zolpidem (AMBIEN CR) 12.5 MG extended release tablet TAKE ONE TABLET BY MOUTH NIGHTLY AS NEEDED FOR SLEEP- KAMARI 2/3/21 5/14/21 Yes Beverly Umaña MD   LORazepam (ATIVAN) 0.5 MG tablet Take 1 tablet by mouth every 8 hours as needed for Anxiety for up to 90 days. 1/28/21 4/28/21 Yes Jossie Maldonado MD   tamsulosin (FLOMAX) 0.4 MG capsule TAKE 1 CAPSULE BY MOUTH  DAILY 1/12/21  Yes Jossie Maldonado MD   rosuvastatin (CRESTOR) 20 MG tablet TAKE 1 TABLET BY MOUTH  DAILY 8/18/20  Yes Jossie Maldonado MD   clopidogrel (PLAVIX) 75 MG tablet TAKE 1 TABLET BY MOUTH  DAILY 8/18/20  Yes Jossie Maldonado MD   omeprazole (PRILOSEC) 20 MG delayed release capsule TAKE 1 CAPSULE BY MOUTH  DAILY  Patient taking differently: Take 20 mg by mouth as needed  8/18/20  Yes Jossie Maldonado MD   fluorouracil (EFUDEX) 5 % cream Apply to the forehead, temples and sides of the cheeks 2 times daily for 14 days. 8/10/20  Yes Brian Balderas MD   JUBLIA 10 % SOLN APPLY TO THE RIGHT BIG TONAIL AND 4TH TOENAIL ONCE A DAY AFTER BATHING 8/16/18  Yes Brian Balderas MD   Vitamin D (CHOLECALCIFEROL) 1000 UNITS CAPS capsule Take 1,000 Units by mouth daily. Yes Historical Provider, MD   aspirin 81 MG EC tablet Take 81 mg by mouth M,W,F only   Yes Historical Provider, MD   olmesartan (BENICAR) 20 MG tablet Take 1 tablet by mouth 9/23/20   Historical Provider, MD       Physical Exam:  Vital Signs: BP (!) 142/94 (Site: Right Upper Arm)   Temp 97.7 °F (36.5 °C)   Ht 5' 8\" (1.727 m)   Wt 201 lb (91.2 kg)   BMI 30.56 kg/m²   General: Patient appears  non-toxic  HENT: Atraumatic, normocephalic, oral mucosa moist  Lungs:  Clear bilaterally  Heart: Regular rate and rhythm  Abdomen: Non-distended, soft, non-tender  Extremities: No edema  Neuro: Nonfocal    Medical Decision Making and Plan:  Pertinent Labs & Imaging studies reviewed. (See chart for details)      1. Obstructive sleep apnea syndrome  To refer back to sleep medicine Dr. Ghassan Schaefer  - External Referral To Sleep Medicine    2.  Primary insomnia  Refill Ambien strongly encouraged to try to DC use is much as possible and follow-up in 3 months  - zolpidem (AMBIEN CR) 12.5 MG extended release tablet; TAKE ONE TABLET BY MOUTH NIGHTLY AS NEEDED FOR SLEEP- KAMARI  Dispense: 90 tablet; Refill: 0

## 2021-02-04 ENCOUNTER — IMMUNIZATION (OUTPATIENT)
Dept: FAMILY MEDICINE CLINIC | Age: 76
End: 2021-02-04
Payer: MEDICARE

## 2021-02-04 PROCEDURE — 91301 COVID-19, MODERNA VACCINE 100MCG/0.5ML DOSE: CPT | Performed by: NURSE PRACTITIONER

## 2021-02-04 PROCEDURE — 0011A PR IMM ADMN SARSCOV2 100 MCG/0.5 ML 1ST DOSE: CPT | Performed by: NURSE PRACTITIONER

## 2021-02-23 ENCOUNTER — OFFICE VISIT (OUTPATIENT)
Dept: DERMATOLOGY | Age: 76
End: 2021-02-23
Payer: MEDICARE

## 2021-02-23 VITALS — TEMPERATURE: 97.2 F

## 2021-02-23 DIAGNOSIS — L57.0 ACTINIC KERATOSIS: Primary | ICD-10-CM

## 2021-02-23 DIAGNOSIS — D48.5 NEOPLASM OF UNCERTAIN BEHAVIOR OF SKIN: ICD-10-CM

## 2021-02-23 PROCEDURE — 11103 TANGNTL BX SKIN EA SEP/ADDL: CPT | Performed by: DERMATOLOGY

## 2021-02-23 PROCEDURE — 17000 DESTRUCT PREMALG LESION: CPT | Performed by: DERMATOLOGY

## 2021-02-23 PROCEDURE — 11102 TANGNTL BX SKIN SINGLE LES: CPT | Performed by: DERMATOLOGY

## 2021-02-23 PROCEDURE — 17003 DESTRUCT PREMALG LES 2-14: CPT | Performed by: DERMATOLOGY

## 2021-02-23 NOTE — PROGRESS NOTES
[facility]  Charlene Nayak MD  [phone]      Leah Cue  1945    70 y.o. male     Date of Visit: 2/23/2021    Chief Complaint: skin cancer follow up     History of Present Illness:    1. Follow-up for history of multiple actinic keratoses-complains of several persistent scaly lesions on the hands and scalp today. 2.  Unknown duration of persistent pink lesions on the left upper chest and shoulder. He has a history of multiple BCCs. Metatypical BCC on the right forehead-treated with Mohs by Dr. Bhupinder Tai on 1/27/2021. Small nodular BCC on the right superior shoulder-treated with curettage at the time of biopsy on 11/17/2020. Nodular BCC of the right mid vertex scalp-treated with Mohs by Dr. Bhupinder Tai on 3/11/2020. Small nodular BCCs on the right anterior upper arm and right mid arm-treated with curettage at the time of biopsy on 2/26/2019. Nodular BCC on the left upper chest history with curettage at the time of biopsy in October 2018. Superficial BCC of the right supraclavicular region-treated with curettage at time of biopsy on 1/3/2018.     Nodular BCC on the right thigh - excised on 6/23/17.    Superficial BCC on the right central upper back - treated with curettage on 3/28/17. Nodular BCC on the left central lower back - treated with curettage on 3/28/17. Nodular BCC on the right mid thigh-treated with curettage on 10/7/2016. Superficial basal cell carcinoma left upper chest-treated with curettage on 9/21/2016.   Nodular BCC of the right mid central back-treated with curettage on 9/21/2016.     Superficial BCC of the left central lower back-treated with curettage on 3/3/2016.     4 BCCs treated with ED&C on 9/2015.    Nodular BCC on the right lateral shoulder, superior  Nodular basal cell carcinoma on the right lateral shoulder, inferior  Superficial basal cell carcinoma of the right upper back  Superficial basal cell carcinoma of the central lower back      Bowen's disease of the left temple-treated with Efudex cream b.i.d. for 4 weeks.    Superficial BCC, right posterior lateral arm-treated with curettage 3/7/14. BCC, right upper arm-excised 3/7/2014. BCC on the nasal dorsum (biopsy 10/2013) status post Mohs micrographic surgery in 2013. Father in law recently . Had melanoma. Review of Systems:  Gen: Feels well, good sense of health. Past Medical History, Family History, Surgical History, Medications and Allergies reviewed. Past Medical History:   Diagnosis Date    Anxiety     BPH (benign prostatic hyperplasia) 2012    CAD (coronary artery disease)     Cancer (HCC)     SKIN    Hyperlipidemia     LBBB (left bundle branch block) 2012    Primary insomnia 10/31/2019    Sleep apnea 2011    USE CPAP     Past Surgical History:   Procedure Laterality Date    ANKLE FRACTURE SURGERY      ANKLE SURGERY Left 10/1/2020    REMOVAL OF HARDWARE LEFT ANKLE performed by Collette Nine, MD at [address] [facility]  2006    MOHS SURGERY Right 2020    Right Mid Vertex Scalp    MOHS SURGERY  2021    R Forehead    TOTAL ANKLE ARTHROPLASTY Left 10/1/2020    LEFT ANKLE REPLACEMENT, GASTROCNEMIUS RECESSION; performed by Collette Nine, MD at [address]       No Known Allergies  No outpatient medications have been marked as taking for the 21 encounter (Office Visit) with Kathy Renner MD.         Physical Examination       The following were examined and determined to be normal: Psych/Neuro, Conjunctivae/eyelids, Gums/teeth/lips, Neck, Abdomen and Back. The following were examined and determined to be abnormal: Scalp/hair, Head/face, Breast/axilla/chest, RUE and LUE. Well-appearing. 1.  Vertex scalp-5, dorsal right hand-3, dorsal left hand-2, left side of forehead-4: Multiple hyperkeratotic erythematous macules. 2.  A.  Left upper chest with a 1.5 cm round pink patch.   B. Left anterior shoulder with a 4 mm crusted pink papule. Assessment and Plan     1. Actinic keratoses - multiple    2 cycles of liquid nitrogen applied to 14 AKs: Vertex scalp-5, dorsal right hand-3, dorsal left hand-2, left side of forehead-4. Patient was educated regarding the potential risks of blister formation, discomfort. Wound care was discussed. 2. Neoplasm of uncertain behavior of skin,   A. Left upper chest - r/o BCC  B. Left anterior shoulder - r/o BCC    Discussed possible diagnosis; patient agreeable to biopsies (verbal consent obtained). The area(s) to be biopsied were marked with a surgical pen. Alcohol was used to cleanse the site. Local anesthesia was acheived with 1% lidocaine with epinephrine. Shave biopsy was performed x 2 using a razor blade. Hemostasis was achieved with aluminum chloride. The wound(s) were dressed with petrolatum and covered with a bandage. Wound care instructions were reviewed. 2 Specimen (s) sent to pathology. The specimen bottles were appropriately labeled. We also reviewed the risks of bleeding, scar, and infection. Return in about 3 months (around 5/23/2021).     --Surya Wilson MD

## 2021-02-23 NOTE — PATIENT INSTRUCTIONS

## 2021-02-25 LAB — DERMATOLOGY PATHOLOGY REPORT: ABNORMAL

## 2021-03-04 ENCOUNTER — IMMUNIZATION (OUTPATIENT)
Dept: FAMILY MEDICINE CLINIC | Age: 76
End: 2021-03-04
Payer: MEDICARE

## 2021-03-04 PROCEDURE — 91301 COVID-19, MODERNA VACCINE 100MCG/0.5ML DOSE: CPT | Performed by: FAMILY MEDICINE

## 2021-03-04 PROCEDURE — 0012A COVID-19, MODERNA VACCINE 100MCG/0.5ML DOSE: CPT | Performed by: FAMILY MEDICINE

## 2021-03-05 ENCOUNTER — PROCEDURE VISIT (OUTPATIENT)
Dept: DERMATOLOGY | Age: 76
End: 2021-03-05
Payer: MEDICARE

## 2021-03-05 VITALS — TEMPERATURE: 97.2 F

## 2021-03-05 DIAGNOSIS — C44.619 BCC (BASAL CELL CARCINOMA), SHOULDER, LEFT: ICD-10-CM

## 2021-03-05 DIAGNOSIS — C44.519 BCC (BASAL CELL CARCINOMA), CHEST: Primary | ICD-10-CM

## 2021-03-05 PROCEDURE — 17261 DSTRJ MAL LES T/A/L .6-1.0CM: CPT | Performed by: DERMATOLOGY

## 2021-03-05 PROCEDURE — 17262 DSTRJ MAL LES T/A/L 1.1-2.0: CPT | Performed by: DERMATOLOGY

## 2021-03-05 NOTE — PROGRESS NOTES
capsule 3    olmesartan (BENICAR) 20 MG tablet Take 1 tablet by mouth      rosuvastatin (CRESTOR) 20 MG tablet TAKE 1 TABLET BY MOUTH  DAILY 90 tablet 3    clopidogrel (PLAVIX) 75 MG tablet TAKE 1 TABLET BY MOUTH  DAILY 90 tablet 3    omeprazole (PRILOSEC) 20 MG delayed release capsule TAKE 1 CAPSULE BY MOUTH  DAILY (Patient taking differently: Take 20 mg by mouth as needed ) 90 capsule 3    fluorouracil (EFUDEX) 5 % cream Apply to the forehead, temples and sides of the cheeks 2 times daily for 14 days. 40 g 0    JUBLIA 10 % SOLN APPLY TO THE RIGHT BIG TONAIL AND 4TH TOENAIL ONCE A DAY AFTER BATHING 4 mL 0    Vitamin D (CHOLECALCIFEROL) 1000 UNITS CAPS capsule Take 1,000 Units by mouth daily.  aspirin 81 MG EC tablet Take 81 mg by mouth M,W,F only         Physical Examination     Well appearing. 1. Left upper chest - 1.5 cm focally eroded erythematous patch. 2.  Left anterior shoulder - 6 mm crusted pink papule. Assessment and Plan     1. Superficial BCC (basal cell carcinoma) on the left upper chest - 1.5 cm    The area to be treated with cleansed with alcohol and marked with surgical marking pen. Local anesthesia was acheived with 1% lidocaine with epinephrine. Sharp curettage was performed in 3 different directions. Hemostasis was obtained with aluminum chloride. The wound was dressed with petrolatum and a bandage. Patient was educated regarding risks including bleeding, discomfort, and risk of recurrence. 2. Small nodular BCC (basal cell carcinoma), left anterior shoulder - 6 mm    The area to be treated with cleansed with alcohol and marked with surgical marking pen. Local anesthesia was acheived with 1% lidocaine with epinephrine. Sharp curettage was performed in multiple directions followed 3 times by electrodessication. Hemostasis was obtained with aluminum chloride. The wound was dressed with petrolatum and a bandage.     Patient was educated regarding risks including bleeding, discomfort, and risk of recurrence.           --Chon Kaiser MD

## 2021-05-03 ENCOUNTER — OFFICE VISIT (OUTPATIENT)
Dept: INTERNAL MEDICINE CLINIC | Age: 76
End: 2021-05-03
Payer: MEDICARE

## 2021-05-03 VITALS
WEIGHT: 190 LBS | HEIGHT: 68 IN | TEMPERATURE: 97.8 F | OXYGEN SATURATION: 92 % | SYSTOLIC BLOOD PRESSURE: 128 MMHG | HEART RATE: 85 BPM | BODY MASS INDEX: 28.79 KG/M2 | DIASTOLIC BLOOD PRESSURE: 66 MMHG

## 2021-05-03 DIAGNOSIS — E78.00 PURE HYPERCHOLESTEROLEMIA: ICD-10-CM

## 2021-05-03 DIAGNOSIS — F51.01 PRIMARY INSOMNIA: ICD-10-CM

## 2021-05-03 DIAGNOSIS — R73.9 HYPERGLYCEMIA: ICD-10-CM

## 2021-05-03 DIAGNOSIS — R73.9 HYPERGLYCEMIA: Primary | ICD-10-CM

## 2021-05-03 DIAGNOSIS — I25.119 CORONARY ARTERY DISEASE INVOLVING NATIVE CORONARY ARTERY WITH ANGINA PECTORIS, UNSPECIFIED WHETHER NATIVE OR TRANSPLANTED HEART (HCC): ICD-10-CM

## 2021-05-03 PROCEDURE — 99213 OFFICE O/P EST LOW 20 MIN: CPT | Performed by: INTERNAL MEDICINE

## 2021-05-03 PROCEDURE — 3017F COLORECTAL CA SCREEN DOC REV: CPT | Performed by: INTERNAL MEDICINE

## 2021-05-03 PROCEDURE — G8417 CALC BMI ABV UP PARAM F/U: HCPCS | Performed by: INTERNAL MEDICINE

## 2021-05-03 PROCEDURE — G8427 DOCREV CUR MEDS BY ELIG CLIN: HCPCS | Performed by: INTERNAL MEDICINE

## 2021-05-03 PROCEDURE — 4040F PNEUMOC VAC/ADMIN/RCVD: CPT | Performed by: INTERNAL MEDICINE

## 2021-05-03 PROCEDURE — 1036F TOBACCO NON-USER: CPT | Performed by: INTERNAL MEDICINE

## 2021-05-03 PROCEDURE — 1123F ACP DISCUSS/DSCN MKR DOCD: CPT | Performed by: INTERNAL MEDICINE

## 2021-05-03 RX ORDER — ZOLPIDEM TARTRATE 12.5 MG/1
TABLET, FILM COATED, EXTENDED RELEASE ORAL
Qty: 90 TABLET | Refills: 0 | Status: SHIPPED | OUTPATIENT
Start: 2021-05-03 | End: 2021-08-03 | Stop reason: SDUPTHER

## 2021-05-03 ASSESSMENT — PATIENT HEALTH QUESTIONNAIRE - PHQ9
SUM OF ALL RESPONSES TO PHQ QUESTIONS 1-9: 0
SUM OF ALL RESPONSES TO PHQ QUESTIONS 1-9: 0
2. FEELING DOWN, DEPRESSED OR HOPELESS: 0
1. LITTLE INTEREST OR PLEASURE IN DOING THINGS: 0

## 2021-05-03 NOTE — PROGRESS NOTES
History     Chief Complaint   Patient presents with     Medication Reaction     RXN to methadone does this morning     HPI  Elaina Lin is a 39 year old female who presents to the Emergency Department with uncontrolled movements. The patient is unable to give much history herself and her significant other states every time after she takes her methadone she has been developing abnormal movements, the last approximate 10--12 hours. The significant other states this has been going on the past 2 weeks and the patient does not have a primary care physician to go to anymore. The significant other also states that they went to [facility] [place] patient was given a medication and tied down which was a traumatic experience for the patient. The significant other and the patient do not want that to happen this time. Patient is unable to give much history and continues to walk and pace and spin around the room with not totally uncontrolled movements.     I have reviewed the Medications, Allergies, Past Medical and Surgical History, and Social History in the [employer] system.    Past Medical History:   Diagnosis Date     Genital herpes 7/19/2012     IMO update changed this record. Please review for accuracy     H/O: substance abuse 8/31/2013     Postpartum depression 10/21/2011     Recur major depress, severe 8/1/2012     Substance abuse      Previous Medications    BUSPIRONE HCL (BUSPAR PO)    Patient unsure of dosage    DIVALPROEX SODIUM (DEPAKOTE PO)    250 mg in the AM and 500 mg at night    EPINEPHRINE (EPIPEN/ADRENACLICK/OR ANY BX GENERIC EQUIV) 0.3 MG/0.3ML INJECTION 2-PACK    Inject 0.3 mLs (0.3 mg) into the muscle once as needed for anaphylaxis    NALOXONE (NARCAN) 1 MG/ML FOR INTRANASAL KIT (2 SYRINGES WITH 2 MUCOSAL ATOMIZER DEVICE)    In opioid overdose put cone in nostril and push 1/2 of contents into each nostril.  Repeat every 3 min if no response until help arrives.    NIFEDIPINE OSMOTIC (PROCARDIA XL) 30 MG 24 HR  CHIEF COMPLAINT: Yris Looney is a 70 y.o. male who presents for : Aloe up insomnia hyperglycemia    HPI: Patient presented with all of the above his major complaint now is his ankle pain she is being followed by orthopedic surgery he denies any chest pain shortness of breath or any other problems or is no evidence of addiction or dependency with regard to his Ambien    Review of Systems:   Constitutional:  Denies fever or chills   Eyes:  Denies change in visual acuity   HENT:  Denies nasal congestion or sore throat   Respiratory:  Denies cough or shortness of breath   Cardiovascular:  Denies chest pain or edema   GI:  Denies abdominal pain, nausea, vomiting, bloody stools or diarrhea   :  Denies dysuria   Musculoskeletal:  Denies back pain or joint pain   Integument:  Denies rash   Neurologic:  Denies headache, focal weakness or sensory changes   Endocrine:  Denies polyuria or polydipsia   Lymphatic:  Denies swollen glands   Psychiatric:  Denies depression or anxiety     Past Medical History:        Diagnosis Date    Anxiety     BPH (benign prostatic hyperplasia) 5/31/2012    CAD (coronary artery disease)     Cancer ([facility] [address]     SKIN    Hyperlipidemia     LBBB (left bundle branch block) 5/31/2012    Primary insomnia 10/31/2019    Sleep apnea 2011    USE CPAP       Past Surgical History:        Procedure Laterality Date    ANKLE FRACTURE SURGERY      ANKLE SURGERY Left 10/1/2020    REMOVAL OF HARDWARE LEFT ANKLE performed by Saima Penn MD at [address] [facility] Right 03/11/2020    Right Mid Vertex Scalp    MOHS SURGERY  01/27/2021    R Forehead    TOTAL ANKLE ARTHROPLASTY Left 10/1/2020    LEFT ANKLE REPLACEMENT, GASTROCNEMIUS RECESSION; performed by Saima Penn MD at [address]       Family History:  Family History   Problem Relation Age of Onset    Cancer Mother     Heart Disease Father     Thyroid Disease Brother        Social History:  Social History     Socioeconomic History    Marital status:      Spouse name: None    Number of children: None    Years of education: None    Highest education level: None   Occupational History    None   Social Needs    Financial resource strain: None    Food insecurity     Worry: None     Inability: None    Transportation needs     Medical: None     Non-medical: None   Tobacco Use    Smoking status: Never Smoker    Smokeless tobacco: Never Used   Substance and Sexual Activity    Alcohol use: Yes     Alcohol/week: 1.7 standard drinks     Types: 2 Standard drinks or equivalent per week     Comment: OCC    Drug use: Never    Sexual activity: None   Lifestyle    Physical activity     Days per week: None     Minutes per session: None    Stress: None   Relationships    Social connections     Talks on phone: None     Gets together: None     Attends Hindu service: None     Active member of club or organization: None     Attends meetings of clubs or organizations: None     Relationship status: None    Intimate partner violence     Fear of current or ex partner: None     Emotionally abused: None     Physically abused: None     Forced sexual activity: None   Other Topics Concern    None   Social History Narrative    None         Allergies:  Patient has no known allergies. Current Medications:    Prior to Admission medications    Medication Sig Start Date End Date Taking?  Authorizing Provider   zolpidem (AMBIEN CR) 12.5 MG extended release tablet TAKE ONE TABLET BY MOUTH NIGHTLY AS NEEDED FOR SLEEP- KAMARI 5/3/21 8/11/21 Yes Trista Hardwick MD   tamsulosin (FLOMAX) 0.4 MG capsule TAKE 1 CAPSULE BY MOUTH  DAILY 1/12/21  Yes Trista Hardwick MD   olmesartan (BENICAR) 20 MG tablet Take 1 tablet by mouth 9/23/20  Yes Historical Provider, MD   rosuvastatin (CRESTOR) 20 MG tablet TAKE 1 TABLET BY MOUTH  DAILY 8/18/20  Yes Trista Hardwick MD   clopidogrel (PLAVIX) 75 MG tablet TAKE 1 TABLET BY MOUTH TABLET    Take 1 tablet (30 mg) by mouth daily    QUETIAPINE (SEROQUEL) 100 MG TABLET    Take by mouth At Bedtime   Methadone      Allergies   Allergen Reactions     Tomato Anaphylaxis     Compazine [Prochlorperazine]      Social History     Social History     Marital status: Single     Spouse name: N/A     Number of children: 3     Years of education: N/A     Occupational History     Not on file.     Social History Main Topics     Smoking status: Current Every Day Smoker     Packs/day: 1.00     Types: Cigarettes     Smokeless tobacco: Never Used      Comment: about 1 cigarettes per day     Alcohol use Yes      Comment: 1 liter vodka per day     Drug use: Yes     Special: Other      Comment: Heroin whatever way available,  last used 24 hours ago     Sexual activity: Yes     Partners: Male     Other Topics Concern      Service No     Blood Transfusions No     Caffeine Concern No     Occupational Exposure No     Hobby Hazards No     Sleep Concern Yes     having some trouble sleeping - can't fall asleep     Stress Concern Yes     job/life related     Weight Concern No     Special Diet No     Back Care No     Exercise No     Bike Helmet No     Seat Belt Yes     Self-Exams No     Social History Narrative     Past Surgical History:   Procedure Laterality Date      SECTION  2011 and 13     HERNIA REPAIR       REPAIR TENDON HAND       Family History   Problem Relation Age of Onset     Asthma Mother      DIABETES Mother      Allergies Mother      Psychotic Disorder Mother      schitzophrenic     DIABETES Maternal Grandmother      HEART DISEASE Maternal Grandmother      triple bypass     Eye Disorder Maternal Grandmother      cataracts     Hypertension Maternal Grandmother      Unknown/Adopted Maternal Grandfather      Unknown/Adopted Paternal Grandmother      Unknown/Adopted Paternal Grandfather      CANCER Sister      leukemia       Review of Systems   Unable to perform ROS: Mental status change        Physical Exam   BP: 111/68  Pulse: 103  Heart Rate: 90  Temp: 98.6  F (37  C)  Resp: 14  Weight: 71.2 kg (157 lb)  SpO2: 93 %      Physical Exam   Constitutional:   Patient moving about the bed contorting her body and twisting, pulling at her clothes and at her significant other's, then standing and walking around the room twisting her body in different directions.   HENT:   Head: Atraumatic.   Eyes: EOM are normal. Pupils are equal, round, and reactive to light.   Neck: Neck supple.   Airway patent   Cardiovascular: Normal heart sounds.    Pulmonary/Chest: No respiratory distress.   Abdominal: Soft.   Musculoskeletal: She exhibits no deformity.   Neurological: She is alert.   Grossly symmetric with equal strength bilaterally; no obvious dystonia   Skin: Skin is warm.   Psychiatric:   Agitated       ED Course     ED Course     Procedures         In trying to work with the significant other and the patient, Ativan orally was chosen initially to try to sedate the patient enough so that an IV could be placed and blood drawn for the beginning of her medical evaluation.  This was unsuccessful and ketamine IM was subsequently used to sedate the patient so that an IV could be placed blood drawn the patient catheterized for urine and her head scanned.    Results for orders placed or performed during the hospital encounter of 05/31/18   Head CT w/o contrast    Narrative    CT OF THE HEAD WITHOUT CONTRAST 5/31/2018 3:14 PM     COMPARISON: None.    HISTORY: Altered mental status.     TECHNIQUE: Axial CT images of the head from the skull base to the  vertex were acquired without IV contrast.    FINDINGS: The ventricles and basal cisterns are within normal limits  in configuration. There is no midline shift. There are no extra-axial  fluid collections.  Gray-white differentiation is well maintained.    No intracranial hemorrhage, mass or recent infarct.    The visualized paranasal sinuses are well-aerated. There is  DAILY 8/18/20  Yes Penny Dempsey MD   omeprazole (PRILOSEC) 20 MG delayed release capsule TAKE 1 CAPSULE BY MOUTH  DAILY  Patient taking differently: Take 20 mg by mouth as needed  8/18/20  Yes Penny Dempsey MD   fluorouracil (EFUDEX) 5 % cream Apply to the forehead, temples and sides of the cheeks 2 times daily for 14 days. 8/10/20  Yes Dina Stone MD   JUBLIA 10 % SOLN APPLY TO THE RIGHT BIG TONAIL AND 4TH TOENAIL ONCE A DAY AFTER BATHING 8/16/18  Yes Dina Stone MD   Vitamin D (CHOLECALCIFEROL) 1000 UNITS CAPS capsule Take 1,000 Units by mouth daily. Yes Historical Provider, MD   aspirin 81 MG EC tablet Take 81 mg by mouth M,W,F only   Yes Historical Provider, MD       Physical Exam:  Vital Signs: /66 (Site: Left Upper Arm)   Pulse 85   Temp 97.8 °F (36.6 °C)   Ht 5' 8\" (1.727 m)   Wt 190 lb (86.2 kg)   SpO2 92%   BMI 28.89 kg/m²   General: Patient appears  non-toxic  HENT: Atraumatic, normocephalic, oral mucosa moist  Lungs:  Clear bilaterally  Heart: Regular rate and rhythm  Abdomen: Non-distended, soft, non-tender  Extremities: No edema  Neuro: Nonfocal    Medical Decision Making and Plan:  Pertinent Labs & Imaging studies reviewed. (See chart for details)  Nonfasting blood tests    1. Coronary artery disease involving native coronary artery with angina pectoris, unspecified whether native or transplanted heart [facility])  This problem is stable continue present meds followed by cardiology    2. Pure hypercholesterolemia  Problem is stable continue present meds    3. Hyperglycemia  Elevated blood sugar will check nonfasting blood test today  - CBC Auto Differential; Future  - Comprehensive Metabolic Panel; Future  - Hemoglobin A1C; Future    4. Primary insomnia  This problem is stable continue present meds no evidence of addiction or dependency  - zolpidem (AMBIEN CR) 12.5 MG extended release tablet; TAKE ONE TABLET BY MOUTH NIGHTLY AS NEEDED FOR SLEEP- KAMARI  Dispense: 90 tablet;  Refill: 0 no  mastoiditis. There are no fractures of the visualized bones.      Impression    IMPRESSION: Normal head CT.      Radiation dose for this scan was reduced using automated exposure  control, adjustment of the mA and/or kV according to patient size, or  iterative reconstruction technique    MICHAEL STEELE MD   CBC with platelets differential   Result Value Ref Range    WBC 15.7 (H) 4.0 - 11.0 10e9/L    RBC Count 4.11 3.8 - 5.2 10e12/L    Hemoglobin 11.4 (L) 11.7 - 15.7 g/dL    Hematocrit 36.4 35.0 - 47.0 %    MCV 89 78 - 100 fl    MCH 27.7 26.5 - 33.0 pg    MCHC 31.3 (L) 31.5 - 36.5 g/dL    RDW 16.5 (H) 10.0 - 15.0 %    Platelet Count 341 150 - 450 10e9/L    Diff Method Automated Method     % Neutrophils 73.3 %    % Lymphocytes 19.5 %    % Monocytes 6.3 %    % Eosinophils 0.4 %    % Basophils 0.2 %    % Immature Granulocytes 0.3 %    Nucleated RBCs 0 0 /100    Absolute Neutrophil 11.5 (H) 1.6 - 8.3 10e9/L    Absolute Lymphocytes 3.1 0.8 - 5.3 10e9/L    Absolute Monocytes 1.0 0.0 - 1.3 10e9/L    Absolute Eosinophils 0.1 0.0 - 0.7 10e9/L    Absolute Basophils 0.0 0.0 - 0.2 10e9/L    Abs Immature Granulocytes 0.1 0 - 0.4 10e9/L    Absolute Nucleated RBC 0.0    INR   Result Value Ref Range    INR 1.16 (H) 0.86 - 1.14   Comprehensive metabolic panel   Result Value Ref Range    Sodium 145 (H) 133 - 144 mmol/L    Potassium 3.7 3.4 - 5.3 mmol/L    Chloride 108 94 - 109 mmol/L    Carbon Dioxide 27 20 - 32 mmol/L    Anion Gap 10 3 - 14 mmol/L    Glucose 66 (L) 70 - 99 mg/dL    Urea Nitrogen 6 (L) 7 - 30 mg/dL    Creatinine 0.98 0.52 - 1.04 mg/dL    GFR Estimate 64 >60 mL/min/1.7m2    GFR Estimate If Black 78 >60 mL/min/1.7m2    Calcium 8.2 (L) 8.5 - 10.1 mg/dL    Bilirubin Total 0.3 0.2 - 1.3 mg/dL    Albumin 3.0 (L) 3.4 - 5.0 g/dL    Protein Total 6.8 6.8 - 8.8 g/dL    Alkaline Phosphatase 53 40 - 150 U/L    ALT 34 0 - 50 U/L    AST 50 (H) 0 - 45 U/L   Ammonia   Result Value Ref Range    Ammonia 17 10 - 50 umol/L   HCG  qualitative Blood   Result Value Ref Range    HCG Qualitative Serum Negative NEG^Negative   Alcohol ethyl   Result Value Ref Range    Ethanol g/dL <0.01 <0.01 g/dL   Valproic acid (Depakote level)   Result Value Ref Range    Valproic Acid Level <3 (L) 50 - 100 mg/L   CK total   Result Value Ref Range    CK Total 639 (H) 30 - 225 U/L   Drug abuse screen 6 urine (tox)   Result Value Ref Range    Amphetamine Qual Urine Negative NEG^Negative    Barbiturates Qual Urine Negative NEG^Negative    Benzodiazepine Qual Urine Negative NEG^Negative    Cannabinoids Qual Urine Negative NEG^Negative    Cocaine Qual Urine Positive (A) NEG^Negative    Ethanol Qual Urine Negative NEG^Negative    Opiates Qualitative Urine Negative NEG^Negative   UA with Microscopic reflex to Culture   Result Value Ref Range    Color Urine Yellow     Appearance Urine Clear     Glucose Urine Negative NEG^Negative mg/dL    Bilirubin Urine Negative NEG^Negative    Ketones Urine Negative NEG^Negative mg/dL    Specific Gravity Urine 1.013 1.003 - 1.035    Blood Urine Negative NEG^Negative    pH Urine 5.5 5.0 - 7.0 pH    Protein Albumin Urine 10 (A) NEG^Negative mg/dL    Urobilinogen mg/dL Normal 0.0 - 2.0 mg/dL    Nitrite Urine Negative NEG^Negative    Leukocyte Esterase Urine Negative NEG^Negative    Source Catheterized Urine     WBC Urine 3 0 - 5 /HPF    RBC Urine <1 0 - 2 /HPF    Mucous Urine Present (A) NEG^Negative /LPF    Hyaline Casts 40 (H) 0 - 2 /LPF   Glucose by meter   Result Value Ref Range    Glucose 59 (L) 70 - 99 mg/dL   Glucose by meter   Result Value Ref Range    Glucose 103 (H) 70 - 99 mg/dL     Patient was eventually given an IV banana bag with D5 normal saline for her relative hypoglycemia.    Medications   sodium chloride (PF) 0.9% PF flush 3 mL (not administered)   sodium chloride (PF) 0.9% PF flush 3 mL (3 mLs Intracatheter Not Given 5/31/18 9256)   flumazenil (ROMAZICON) injection 0.2 mg (not administered)   haloperidol lactate  (HALDOL) injection 2 mg (2 mg Intravenous Given 5/31/18 1444)   LORazepam (ATIVAN) tablet 2 mg (2 mg Oral Given 5/31/18 1056)   ketamine (KETALAR) 100 mg/mL (high concentration) IM ADULT 200 mg (200 mg Intramuscular Given 5/31/18 1258)   haloperidol lactate (HALDOL) injection 2-4 mg (2 mg Intravenous Given 5/31/18 1411)   dextrose 5% and 0.9% NaCl 1,000 mL with INFUVITE 10 mL, thiamine 100 mg, folic acid 1 mg infusion ( Intravenous New Bag 5/31/18 8022)       Critical Care time:  was within 90 minutes for this patient excluding procedures.      Labs Ordered and Resulted from Time of ED Arrival Up to the Time of Departure from the ED   CBC WITH PLATELETS DIFFERENTIAL - Abnormal; Notable for the following:        Result Value    WBC 15.7 (*)     Hemoglobin 11.4 (*)     MCHC 31.3 (*)     RDW 16.5 (*)     Absolute Neutrophil 11.5 (*)     All other components within normal limits   INR - Abnormal; Notable for the following:     INR 1.16 (*)     All other components within normal limits   COMPREHENSIVE METABOLIC PANEL - Abnormal; Notable for the following:     Sodium 145 (*)     Glucose 66 (*)     Urea Nitrogen 6 (*)     Calcium 8.2 (*)     Albumin 3.0 (*)     AST 50 (*)     All other components within normal limits   VALPROIC ACID - Abnormal; Notable for the following:     Valproic Acid Level <3 (*)     All other components within normal limits   CK TOTAL - Abnormal; Notable for the following:     CK Total 639 (*)     All other components within normal limits   DRUG ABUSE SCREEN 6 CHEM DEP URINE ([facility]) - Abnormal; Notable for the following:     Cocaine Qual Urine Positive (*)     All other components within normal limits   ROUTINE UA WITH MICROSCOPIC REFLEX TO CULTURE - Abnormal; Notable for the following:     Protein Albumin Urine 10 (*)     Mucous Urine Present (*)     Hyaline Casts 40 (*)     All other components within normal limits   GLUCOSE BY METER - Abnormal; Notable for the following:     Glucose 59 (*)     All  other components within normal limits   GLUCOSE BY METER - Abnormal; Notable for the following:     Glucose 103 (*)     All other components within normal limits   GLUCOSE MONITOR NURSING POCT   AMMONIA   HCG QUALITATIVE   ALCOHOL ETHYL   PERIPHERAL IV CATHETER   PULSE OXIMETRY NURSING   STRAIGHT CATH FOR URINE   CARDIAC CONTINUOUS MONITORING         Assessments & Plan (with Medical Decision Making)     I have reviewed the nursing notes.    Patient was heavily sedated for a medical evaluation including blood draw, urine testing and head CT.  The patient at this point is still sedated with the Ativan, ketamine and Haldol used to complete her medical evaluation.  At this point the case was discussed with the hospitalist service who requested neurology evaluation prior to being admitted to the [facility].  Because temporal lobe seizure is part of the differential diagnosis, neurology was contacted and requested the patient be transferred over to the [facility] for evaluation by them.  Case was discussed with neurology and with Dr. Hall in the [facility] as well as the [facility] charge nurse.    I have reviewed the findings, diagnosis, and plan with the patient's significant other.    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   Opioid dependence with intoxication with complication (H)   Substance abuse - cocaine     Ciaran Pressley MD    I, Adelfo Truong, am serving as a trained medical scribe to document services personally performed by Ciaran Pressley MD, based on the provider's statements to me.   I, Ciaran Pressley MD, was physically present and have reviewed and verified the accuracy of this note documented by Adelfo Truong.    5/31/2018   [facility], [place], EMERGENCY DEPARTMENT     Ciaran Pressley MD  05/31/18 0251

## 2021-05-04 LAB
A/G RATIO: 1.9 (ref 1.1–2.2)
ALBUMIN SERPL-MCNC: 4.5 G/DL (ref 3.4–5)
ALP BLD-CCNC: 63 U/L (ref 40–129)
ALT SERPL-CCNC: 17 U/L (ref 10–40)
ANION GAP SERPL CALCULATED.3IONS-SCNC: 14 MMOL/L (ref 3–16)
AST SERPL-CCNC: 20 U/L (ref 15–37)
BASOPHILS ABSOLUTE: 0.1 K/UL (ref 0–0.2)
BASOPHILS RELATIVE PERCENT: 0.7 %
BILIRUB SERPL-MCNC: 0.3 MG/DL (ref 0–1)
BUN BLDV-MCNC: 15 MG/DL (ref 7–20)
CALCIUM SERPL-MCNC: 8.9 MG/DL (ref 8.3–10.6)
CHLORIDE BLD-SCNC: 105 MMOL/L (ref 99–110)
CO2: 21 MMOL/L (ref 21–32)
CREAT SERPL-MCNC: 0.9 MG/DL (ref 0.8–1.3)
EOSINOPHILS ABSOLUTE: 0.2 K/UL (ref 0–0.6)
EOSINOPHILS RELATIVE PERCENT: 2.2 %
ESTIMATED AVERAGE GLUCOSE: 148.5 MG/DL
GFR AFRICAN AMERICAN: >60
GFR NON-AFRICAN AMERICAN: >60
GLOBULIN: 2.4 G/DL
GLUCOSE BLD-MCNC: 143 MG/DL (ref 70–99)
HBA1C MFR BLD: 6.8 %
HCT VFR BLD CALC: 41.9 % (ref 40.5–52.5)
HEMOGLOBIN: 14.1 G/DL (ref 13.5–17.5)
LYMPHOCYTES ABSOLUTE: 1.7 K/UL (ref 1–5.1)
LYMPHOCYTES RELATIVE PERCENT: 23.9 %
MCH RBC QN AUTO: 31 PG (ref 26–34)
MCHC RBC AUTO-ENTMCNC: 33.6 G/DL (ref 31–36)
MCV RBC AUTO: 92.4 FL (ref 80–100)
MONOCYTES ABSOLUTE: 0.5 K/UL (ref 0–1.3)
MONOCYTES RELATIVE PERCENT: 6.7 %
NEUTROPHILS ABSOLUTE: 4.6 K/UL (ref 1.7–7.7)
NEUTROPHILS RELATIVE PERCENT: 66.5 %
PDW BLD-RTO: 13.7 % (ref 12.4–15.4)
PLATELET # BLD: 241 K/UL (ref 135–450)
PMV BLD AUTO: 7.9 FL (ref 5–10.5)
POTASSIUM SERPL-SCNC: 4.1 MMOL/L (ref 3.5–5.1)
RBC # BLD: 4.54 M/UL (ref 4.2–5.9)
SODIUM BLD-SCNC: 140 MMOL/L (ref 136–145)
TOTAL PROTEIN: 6.9 G/DL (ref 6.4–8.2)
WBC # BLD: 7 K/UL (ref 4–11)

## 2021-05-06 DIAGNOSIS — E11.9 TYPE 2 DIABETES MELLITUS TREATED WITHOUT INSULIN (HCC): Primary | ICD-10-CM

## 2021-05-06 DIAGNOSIS — E11.9 TYPE 2 DIABETES MELLITUS TREATED WITHOUT INSULIN (HCC): ICD-10-CM

## 2021-05-06 RX ORDER — METFORMIN HYDROCHLORIDE 500 MG/1
TABLET, EXTENDED RELEASE ORAL
Qty: 90 TABLET | Refills: 0 | Status: SHIPPED | OUTPATIENT
Start: 2021-05-06 | End: 2021-08-02

## 2021-05-06 RX ORDER — METFORMIN HYDROCHLORIDE 500 MG/1
500 TABLET, EXTENDED RELEASE ORAL
Qty: 30 TABLET | Refills: 1 | Status: SHIPPED | OUTPATIENT
Start: 2021-05-06 | End: 2021-05-06

## 2021-05-26 ENCOUNTER — OFFICE VISIT (OUTPATIENT)
Dept: DERMATOLOGY | Age: 76
End: 2021-05-26
Payer: MEDICARE

## 2021-05-26 VITALS — TEMPERATURE: 98.1 F

## 2021-05-26 DIAGNOSIS — L57.0 ACTINIC KERATOSIS: Primary | ICD-10-CM

## 2021-05-26 PROCEDURE — 17004 DESTROY PREMAL LESIONS 15/>: CPT | Performed by: DERMATOLOGY

## 2021-05-26 NOTE — PROGRESS NOTES
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 for 4 weeks.    Superficial BCC, right posterior lateral arm-treated with curettage 3/7/14. BCC, right upper arm-excised 3/7/2014. BCC on the nasal dorsum (biopsy 10/2013) status post Mohs micrographic surgery in December 2013. Review of Systems:  Gen: Feels well, good sense of health. Past Medical History, Family History, Surgical History, Medications and Allergies reviewed.     Past Medical History:   Diagnosis Date    Anxiety     BPH (benign prostatic hyperplasia) 5/31/2012    CAD (coronary artery disease)     Cancer ([facility] [address]     SKIN    Hyperlipidemia     LBBB (left bundle branch block) 5/31/2012    Primary insomnia 10/31/2019    Sleep apnea 2011    USE CPAP     Past Surgical History:   Procedure Laterality Date    ANKLE FRACTURE SURGERY      ANKLE SURGERY Left 10/1/2020    REMOVAL OF HARDWARE LEFT ANKLE performed by Marciano Goff MD at [address] [facility]  2006    MOHS SURGERY Right 03/11/2020    Right Mid Vertex Scalp    MOHS SURGERY  01/27/2021    R Forehead    TOTAL ANKLE ARTHROPLASTY Left 10/1/2020    LEFT ANKLE REPLACEMENT, GASTROCNEMIUS RECESSION; performed by Marciano Goff MD at [address] [place] [address] [address]       No Known Allergies  Outpatient Medications Marked as Taking for the 5/26/21 encounter (Office Visit) with Aayush Bunch MD   Medication Sig Dispense Refill    metFORMIN (GLUCOPHAGE-XR) 500 MG extended release tablet TAKE 1 TABLET BY MOUTH DAILY WITH BREAKFAST 90 tablet 0    zolpidem (AMBIEN CR) 12.5 MG extended release tablet TAKE ONE TABLET BY MOUTH NIGHTLY AS NEEDED FOR SLEEP- KAMARI 90 tablet 0    tamsulosin (FLOMAX) 0.4 MG capsule TAKE 1 CAPSULE BY MOUTH  DAILY 90 capsule 3    olmesartan (BENICAR) 20 MG tablet Take 1 tablet by mouth      rosuvastatin (CRESTOR) 20 MG tablet TAKE 1 TABLET BY MOUTH  DAILY 90 tablet 3    clopidogrel (PLAVIX) 75 MG tablet TAKE 1 TABLET BY MOUTH  DAILY 90 tablet 3    omeprazole (PRILOSEC) 20 MG delayed release capsule TAKE 1 CAPSULE BY MOUTH  DAILY (Patient taking differently: Take 20 mg by mouth as needed ) 90 capsule 3    fluorouracil (EFUDEX) 5 % cream Apply to the forehead, temples and sides of the cheeks 2 times daily for 14 days. 40 g 0    JUBLIA 10 % SOLN APPLY TO THE RIGHT BIG TONAIL AND 4TH TOENAIL ONCE A DAY AFTER BATHING 4 mL 0    Vitamin D (CHOLECALCIFEROL) 1000 UNITS CAPS capsule Take 1,000 Units by mouth daily.  aspirin 81 MG EC tablet Take 81 mg by mouth M,W,F only             Physical Examination       The following were examined and determined to be normal: Psych/Neuro, Conjunctivae/eyelids, Gums/teeth/lips, Neck, Breast/axilla/chest, Abdomen, RUE and LUE. The following were examined and determined to be abnormal: Scalp/hair, Head/face and Back. Well appearing. 1.  Crown of the scalp - 4, right earlobe - 1, forehead - 9 , left lateral brow - 1, left central cheek - 1, right upper back - 1: ill defined keratotic pink macules and patches. Assessment and Plan     1. Actinic keratoses - multiple    2 cycles of liquid nitrogen applied to 17 AKs: Crown of the scalp - 4, right earlobe - 1, forehead - 9 , left lateral brow - 1, left central cheek - 1, right upper back - 1. Patient was educated regarding the potential risks of blister formation and discomfort. Wound care was discussed. Return in about 3 months (around 8/26/2021).     --Aayush Bunch MD

## 2021-06-02 DIAGNOSIS — F51.01 PRIMARY INSOMNIA: ICD-10-CM

## 2021-06-02 RX ORDER — LORAZEPAM 0.5 MG/1
0.5 TABLET ORAL EVERY 8 HOURS PRN
Qty: 270 TABLET | Refills: 0 | Status: SHIPPED | OUTPATIENT
Start: 2021-06-02 | End: 2021-09-16 | Stop reason: SDUPTHER

## 2021-07-07 DIAGNOSIS — Z95.5 S/P CORONARY ARTERY STENT PLACEMENT: ICD-10-CM

## 2021-07-07 DIAGNOSIS — E78.5 HYPERLIPIDEMIA, UNSPECIFIED HYPERLIPIDEMIA TYPE: ICD-10-CM

## 2021-07-07 DIAGNOSIS — I45.10 RBBB (RIGHT BUNDLE BRANCH BLOCK): ICD-10-CM

## 2021-07-07 DIAGNOSIS — R55 SYNCOPE, NEAR: ICD-10-CM

## 2021-07-07 DIAGNOSIS — E78.5 HYPERLIPEMIA: ICD-10-CM

## 2021-07-07 DIAGNOSIS — I25.10 CAD (CORONARY ARTERY DISEASE): ICD-10-CM

## 2021-07-08 RX ORDER — CLOPIDOGREL BISULFATE 75 MG/1
75 TABLET ORAL DAILY
Qty: 90 TABLET | Refills: 3 | Status: SHIPPED | OUTPATIENT
Start: 2021-07-08 | End: 2022-06-10

## 2021-07-08 RX ORDER — ROSUVASTATIN CALCIUM 20 MG/1
20 TABLET, COATED ORAL DAILY
Qty: 90 TABLET | Refills: 3 | Status: SHIPPED | OUTPATIENT
Start: 2021-07-08 | End: 2022-06-10

## 2021-07-30 DIAGNOSIS — E11.9 TYPE 2 DIABETES MELLITUS TREATED WITHOUT INSULIN (HCC): ICD-10-CM

## 2021-08-02 RX ORDER — METFORMIN HYDROCHLORIDE 500 MG/1
TABLET, EXTENDED RELEASE ORAL
Qty: 90 TABLET | Refills: 0 | Status: SHIPPED | OUTPATIENT
Start: 2021-08-02 | End: 2021-08-03 | Stop reason: SDUPTHER

## 2021-08-03 ENCOUNTER — OFFICE VISIT (OUTPATIENT)
Dept: INTERNAL MEDICINE CLINIC | Age: 76
End: 2021-08-03
Payer: MEDICARE

## 2021-08-03 ENCOUNTER — TELEPHONE (OUTPATIENT)
Dept: INTERNAL MEDICINE CLINIC | Age: 76
End: 2021-08-03

## 2021-08-03 VITALS
WEIGHT: 180.5 LBS | DIASTOLIC BLOOD PRESSURE: 74 MMHG | HEIGHT: 68 IN | BODY MASS INDEX: 27.35 KG/M2 | SYSTOLIC BLOOD PRESSURE: 134 MMHG

## 2021-08-03 DIAGNOSIS — E11.9 TYPE 2 DIABETES MELLITUS TREATED WITHOUT INSULIN (HCC): Primary | ICD-10-CM

## 2021-08-03 DIAGNOSIS — F51.01 PRIMARY INSOMNIA: ICD-10-CM

## 2021-08-03 DIAGNOSIS — E78.00 PURE HYPERCHOLESTEROLEMIA: ICD-10-CM

## 2021-08-03 DIAGNOSIS — E11.9 TYPE 2 DIABETES MELLITUS TREATED WITHOUT INSULIN (HCC): ICD-10-CM

## 2021-08-03 DIAGNOSIS — I25.119 CORONARY ARTERY DISEASE INVOLVING NATIVE CORONARY ARTERY WITH ANGINA PECTORIS, UNSPECIFIED WHETHER NATIVE OR TRANSPLANTED HEART (HCC): ICD-10-CM

## 2021-08-03 LAB
A/G RATIO: 1.7 (ref 1.1–2.2)
ALBUMIN SERPL-MCNC: 4.6 G/DL (ref 3.4–5)
ALP BLD-CCNC: 63 U/L (ref 40–129)
ALT SERPL-CCNC: 14 U/L (ref 10–40)
ANION GAP SERPL CALCULATED.3IONS-SCNC: 15 MMOL/L (ref 3–16)
AST SERPL-CCNC: 17 U/L (ref 15–37)
BASOPHILS ABSOLUTE: 0.1 K/UL (ref 0–0.2)
BASOPHILS RELATIVE PERCENT: 0.9 %
BILIRUB SERPL-MCNC: <0.2 MG/DL (ref 0–1)
BUN BLDV-MCNC: 15 MG/DL (ref 7–20)
CALCIUM SERPL-MCNC: 9.3 MG/DL (ref 8.3–10.6)
CHLORIDE BLD-SCNC: 104 MMOL/L (ref 99–110)
CO2: 23 MMOL/L (ref 21–32)
CREAT SERPL-MCNC: 0.8 MG/DL (ref 0.8–1.3)
EOSINOPHILS ABSOLUTE: 0.2 K/UL (ref 0–0.6)
EOSINOPHILS RELATIVE PERCENT: 2.6 %
GFR AFRICAN AMERICAN: >60
GFR NON-AFRICAN AMERICAN: >60
GLOBULIN: 2.7 G/DL
GLUCOSE BLD-MCNC: 99 MG/DL (ref 70–99)
HCT VFR BLD CALC: 39.8 % (ref 40.5–52.5)
HEMOGLOBIN: 13.4 G/DL (ref 13.5–17.5)
LYMPHOCYTES ABSOLUTE: 1.5 K/UL (ref 1–5.1)
LYMPHOCYTES RELATIVE PERCENT: 23 %
MCH RBC QN AUTO: 31 PG (ref 26–34)
MCHC RBC AUTO-ENTMCNC: 33.7 G/DL (ref 31–36)
MCV RBC AUTO: 91.8 FL (ref 80–100)
MONOCYTES ABSOLUTE: 0.6 K/UL (ref 0–1.3)
MONOCYTES RELATIVE PERCENT: 8.7 %
NEUTROPHILS ABSOLUTE: 4.2 K/UL (ref 1.7–7.7)
NEUTROPHILS RELATIVE PERCENT: 64.8 %
PDW BLD-RTO: 13.2 % (ref 12.4–15.4)
PLATELET # BLD: 266 K/UL (ref 135–450)
PMV BLD AUTO: 7.7 FL (ref 5–10.5)
POTASSIUM SERPL-SCNC: 4.5 MMOL/L (ref 3.5–5.1)
RBC # BLD: 4.33 M/UL (ref 4.2–5.9)
SODIUM BLD-SCNC: 142 MMOL/L (ref 136–145)
TOTAL PROTEIN: 7.3 G/DL (ref 6.4–8.2)
WBC # BLD: 6.5 K/UL (ref 4–11)

## 2021-08-03 PROCEDURE — 3017F COLORECTAL CA SCREEN DOC REV: CPT | Performed by: INTERNAL MEDICINE

## 2021-08-03 PROCEDURE — G8427 DOCREV CUR MEDS BY ELIG CLIN: HCPCS | Performed by: INTERNAL MEDICINE

## 2021-08-03 PROCEDURE — 1036F TOBACCO NON-USER: CPT | Performed by: INTERNAL MEDICINE

## 2021-08-03 PROCEDURE — 1123F ACP DISCUSS/DSCN MKR DOCD: CPT | Performed by: INTERNAL MEDICINE

## 2021-08-03 PROCEDURE — 2022F DILAT RTA XM EVC RTNOPTHY: CPT | Performed by: INTERNAL MEDICINE

## 2021-08-03 PROCEDURE — 4040F PNEUMOC VAC/ADMIN/RCVD: CPT | Performed by: INTERNAL MEDICINE

## 2021-08-03 PROCEDURE — 3044F HG A1C LEVEL LT 7.0%: CPT | Performed by: INTERNAL MEDICINE

## 2021-08-03 PROCEDURE — G8417 CALC BMI ABV UP PARAM F/U: HCPCS | Performed by: INTERNAL MEDICINE

## 2021-08-03 PROCEDURE — 99214 OFFICE O/P EST MOD 30 MIN: CPT | Performed by: INTERNAL MEDICINE

## 2021-08-03 RX ORDER — ZOLPIDEM TARTRATE 12.5 MG/1
TABLET, FILM COATED, EXTENDED RELEASE ORAL
Qty: 90 TABLET | Refills: 3 | Status: SHIPPED | OUTPATIENT
Start: 2021-08-03 | End: 2021-08-03 | Stop reason: SDUPTHER

## 2021-08-03 RX ORDER — ZOLPIDEM TARTRATE 12.5 MG/1
TABLET, FILM COATED, EXTENDED RELEASE ORAL
Qty: 90 TABLET | Refills: 3 | Status: SHIPPED | OUTPATIENT
Start: 2021-08-03 | End: 2021-11-01 | Stop reason: SDUPTHER

## 2021-08-03 RX ORDER — METFORMIN HYDROCHLORIDE 500 MG/1
TABLET, EXTENDED RELEASE ORAL
Qty: 90 TABLET | Refills: 3 | Status: SHIPPED | OUTPATIENT
Start: 2021-08-03 | End: 2022-04-19 | Stop reason: SDUPTHER

## 2021-08-03 SDOH — ECONOMIC STABILITY: FOOD INSECURITY: WITHIN THE PAST 12 MONTHS, THE FOOD YOU BOUGHT JUST DIDN'T LAST AND YOU DIDN'T HAVE MONEY TO GET MORE.: NEVER TRUE

## 2021-08-03 SDOH — ECONOMIC STABILITY: FOOD INSECURITY: WITHIN THE PAST 12 MONTHS, YOU WORRIED THAT YOUR FOOD WOULD RUN OUT BEFORE YOU GOT MONEY TO BUY MORE.: NEVER TRUE

## 2021-08-03 ASSESSMENT — SOCIAL DETERMINANTS OF HEALTH (SDOH): HOW HARD IS IT FOR YOU TO PAY FOR THE VERY BASICS LIKE FOOD, HOUSING, MEDICAL CARE, AND HEATING?: NOT HARD AT ALL

## 2021-08-03 NOTE — TELEPHONE ENCOUNTER
Patient is requesting the following medication to be filled and sent his local pharmacy:        zolpidem (AMBIEN CR) 12.5 MG extended release tablet      [facility] [id number]f, [address] [place] [address] [phone]      Please call for questions

## 2021-08-03 NOTE — PROGRESS NOTES
CHIEF COMPLAINT: Luis Manuel Pedro is a 70 y.o. male who presents for : Follow-up diabetes coronary artery disease    HPI: Patient presented with above he has been losing weight and watching his diet  Has not seen the dietitian yet  Review of Systems:   Constitutional:  Denies fever or chills   Eyes:  Denies change in visual acuity   HENT:  Denies nasal congestion or sore throat   Respiratory:  Denies cough or shortness of breath   Cardiovascular:  Denies chest pain or edema   GI:  Denies abdominal pain, nausea, vomiting, bloody stools or diarrhea   :  Denies dysuria   Musculoskeletal:  Denies back pain or joint pain   Integument:  Denies rash   Neurologic:  Denies headache, focal weakness or sensory changes   Endocrine:  Denies polyuria or polydipsia   Lymphatic:  Denies swollen glands   Psychiatric:  Denies depression or anxiety     Past Medical History:        Diagnosis Date    Anxiety     BPH (benign prostatic hyperplasia) 5/31/2012    CAD (coronary artery disease)     Cancer ([facility] [address]     SKIN    Hyperlipidemia     LBBB (left bundle branch block) 5/31/2012    Primary insomnia 10/31/2019    Sleep apnea 2011    USE CPAP       Past Surgical History:        Procedure Laterality Date    ANKLE FRACTURE SURGERY      ANKLE SURGERY Left 10/1/2020    REMOVAL OF HARDWARE LEFT ANKLE performed by Elmo Koroma MD at [place] [address]  2006    MOHS SURGERY Right 03/11/2020    Right Mid Vertex Scalp    MOHS SURGERY  01/27/2021    R Forehead    TOTAL ANKLE ARTHROPLASTY Left 10/1/2020    LEFT ANKLE REPLACEMENT, GASTROCNEMIUS RECESSION; performed by Elmo Koroma MD at [address] [place] [address] History:  Family History   Problem Relation Age of Onset    Cancer Mother     Heart Disease Father     Thyroid Disease Brother        Social History:  Social History     Socioeconomic History    Marital status:      Spouse name: None    Number of children: None    Years of education: None    Highest education level: None   Occupational History    None   Tobacco Use    Smoking status: Never Smoker    Smokeless tobacco: Never Used   Vaping Use    Vaping Use: Never used   Substance and Sexual Activity    Alcohol use: Yes     Alcohol/week: 1.7 standard drinks     Types: 2 Standard drinks or equivalent per week     Comment: OCC    Drug use: Never    Sexual activity: None   Other Topics Concern    None   Social History Narrative    None     Social Determinants of Health     Financial Resource Strain: Low Risk     Difficulty of Paying Living Expenses: Not hard at all   Food Insecurity: No Food Insecurity    Worried About Running Out of Food in the Last Year: Never true    Elias of Food in the Last Year: Never true   Transportation Needs:     Lack of Transportation (Medical):  Lack of Transportation (Non-Medical):    Physical Activity:     Days of Exercise per Week:     Minutes of Exercise per Session:    Stress:     Feeling of Stress :    Social Connections:     Frequency of Communication with Friends and Family:     Frequency of Social Gatherings with Friends and Family:     Attends Cheondoism Services:     Active Member of Clubs or Organizations:     Attends Club or Organization Meetings:     Marital Status:    Intimate Partner Violence:     Fear of Current or Ex-Partner:     Emotionally Abused:     Physically Abused:     Sexually Abused: Allergies:  Patient has no known allergies. Current Medications:    Prior to Admission medications    Medication Sig Start Date End Date Taking?  Authorizing Provider   metFORMIN (GLUCOPHAGE-XR) 500 MG extended release tablet TAKE 1 TABLET BY MOUTH DAILY WITH BREAKFAST 8/3/21  Yes Jose Zuleta MD   zolpidem (AMBIEN CR) 12.5 MG extended release tablet TAKE ONE TABLET BY MOUTH NIGHTLY AS NEEDED FOR SLEEP- KAMARI 8/3/21 11/11/21 Yes Jose Zuleta MD   rosuvastatin (CRESTOR) 20 MG tablet TAKE 1 TABLET BY MOUTH  DAILY 7/8/21  Yes Mariana Higginbotham MD   clopidogrel (PLAVIX) 75 MG tablet TAKE 1 TABLET BY MOUTH  DAILY 7/8/21  Yes Mariana Higginbotham MD   LORazepam (ATIVAN) 0.5 MG tablet Take 1 tablet by mouth every 8 hours as needed for Anxiety for up to 90 days. 6/2/21 8/31/21 Yes Mariana Higginbotham MD   tamsulosin (FLOMAX) 0.4 MG capsule TAKE 1 CAPSULE BY MOUTH  DAILY 1/12/21  Yes Mariana Higginbotham MD   olmesartan (BENICAR) 20 MG tablet Take 1 tablet by mouth 9/23/20  Yes Historical Provider, MD   omeprazole (PRILOSEC) 20 MG delayed release capsule TAKE 1 CAPSULE BY MOUTH  DAILY  Patient taking differently: Take 20 mg by mouth as needed  8/18/20  Yes Mariana Higginbotham MD   fluorouracil (EFUDEX) 5 % cream Apply to the forehead, temples and sides of the cheeks 2 times daily for 14 days. 8/10/20  Yes Fabiola Juan MD   JUBLIA 10 % SOLN APPLY TO THE RIGHT BIG TONAIL AND 4TH TOENAIL ONCE A DAY AFTER BATHING 8/16/18  Yes Fabiola Juan MD   Vitamin D (CHOLECALCIFEROL) 1000 UNITS CAPS capsule Take 1,000 Units by mouth daily. Yes Historical Provider, MD   aspirin 81 MG EC tablet Take 81 mg by mouth M,W,F only   Yes Historical Provider, MD       Physical Exam:  Vital Signs: /74 (Site: Left Upper Arm)   Ht 5' 8\" (1.727 m)   Wt 180 lb 8 oz (81.9 kg)   BMI 27.44 kg/m²   General: Patient appears  non-toxic  HENT: Atraumatic, normocephalic, oral mucosa moist  Lungs:  Clear bilaterally  Heart: Regular rate and rhythm  Abdomen: Non-distended, soft, non-tender  Extremities: No edema  Neuro: Nonfocal    Medical Decision Making and Plan:  Pertinent Labs & Imaging studies reviewed. (See chart for details)  Studies are pending    1. Type 2 diabetes mellitus treated without insulin (HCC)  This problem is stable check above labs continue present meds furl to dietitian  - metFORMIN (GLUCOPHAGE-XR) 500 MG extended release tablet; TAKE 1 TABLET BY MOUTH DAILY WITH BREAKFAST  Dispense: 90 tablet;  Refill: 3  - CBC Auto Differential; Future  - Comprehensive Metabolic Panel;

## 2021-08-04 LAB
ESTIMATED AVERAGE GLUCOSE: 139.9 MG/DL
HBA1C MFR BLD: 6.5 %

## 2021-08-12 ENCOUNTER — OFFICE VISIT (OUTPATIENT)
Dept: INTERNAL MEDICINE CLINIC | Age: 76
End: 2021-08-12

## 2021-08-12 DIAGNOSIS — E11.9 TYPE 2 DIABETES MELLITUS TREATED WITHOUT INSULIN (HCC): Primary | ICD-10-CM

## 2021-08-12 NOTE — PATIENT INSTRUCTIONS
BEHAVIOR GOALS     When to eat: Eat first meal within an hour of waking, then have meal or snack every five hours while awake.     What and how much to eat:   1) Plan meals to follow Plate Guide, with 1/2 plate vegetables, 1/4 plate protein, and 1/4 plate starch or fruit  2) Aim for 30 - 45 g Carb per meal; 15 - 20 g Carb per snack    Physical Activity: All or most days of the week Pt up to commode with assist of one

## 2021-08-12 NOTE — PROGRESS NOTES
Medical Nutrition Therapy for Diabetes    Chana Hart  August 12, 2021      Patient Care Team:  Jeannine Callaway MD as PCP - General (Internal Medicine)  Jeannine Callaway MD as PCP - [facility] Empaneled Provider    Reason for visit: New Patient - DM    ASSESSMENT/PLAN:   NUTRITION DIAGNOSIS    #1 Problem: Altered Nutrition-Related Laboratory Values (NC-2.2)  Related to: Endocrine/Diabetes   As Evidenced by: Elevated Plasma glucose and/or HgbA1c levels           #2 Problem: Overweight/Obesity (NC-3.3)  Related to: Excessive energy intake or physical inactivity  As Evidenced by: BMI more than normative standard for age and sex (BMI=27.44)        NUTRITION INTERVENTION  Nutrition Prescription: 30 - 45 g Carbohydrate per meal, 15 - 20 g per snack      Diabetes Education/Counseling included:  Carbohydrate Control, Activity/exercise, Label-reading and Medications    Interventions:  Control Carbohydrate Intake using Plate Guide and Control Carbohydrate Intake using Carb Counting    Where Do I Begin? Living with Type 2 Diabetes - ADA      NUTRITION MONITORING AND EVALUATION    Patient Instructions   BEHAVIOR GOALS     When to eat: Eat first meal within an hour of waking, then have meal or snack every five hours while awake.     What and how much to eat:   1) Plan meals to follow Plate Guide, with 1/2 plate vegetables, 1/4 plate protein, and 1/4 plate starch or fruit  2) Aim for 30 - 45 g Carb per meal; 15 - 20 g Carb per snack    Physical Activity: All or most days of the week               Patient Active Problem List   Diagnosis    Hyperlipemia    CAD (coronary artery disease)    S/P coronary artery stent placement    Sleep apnea    LBBB (left bundle branch block)    BPH (benign prostatic hyperplasia)    Vitamin D deficiency    AK (actinic keratosis)    History of nonmelanoma skin cancer    Primary insomnia    Visit for suture removal    Arthritis of left ankle    Basal cell carcinoma of right forehead    Type 2 diabetes mellitus treated without insulin (HCC)       Current Outpatient Medications   Medication Sig Dispense Refill    metFORMIN (GLUCOPHAGE-XR) 500 MG extended release tablet TAKE 1 TABLET BY MOUTH DAILY WITH BREAKFAST 90 tablet 3    zolpidem (AMBIEN CR) 12.5 MG extended release tablet TAKE ONE TABLET BY MOUTH NIGHTLY AS NEEDED FOR SLEEP- KAMARI 90 tablet 3    rosuvastatin (CRESTOR) 20 MG tablet TAKE 1 TABLET BY MOUTH  DAILY 90 tablet 3    clopidogrel (PLAVIX) 75 MG tablet TAKE 1 TABLET BY MOUTH  DAILY 90 tablet 3    LORazepam (ATIVAN) 0.5 MG tablet Take 1 tablet by mouth every 8 hours as needed for Anxiety for up to 90 days. 270 tablet 0    tamsulosin (FLOMAX) 0.4 MG capsule TAKE 1 CAPSULE BY MOUTH  DAILY 90 capsule 3    olmesartan (BENICAR) 20 MG tablet Take 1 tablet by mouth      omeprazole (PRILOSEC) 20 MG delayed release capsule TAKE 1 CAPSULE BY MOUTH  DAILY (Patient taking differently: Take 20 mg by mouth as needed ) 90 capsule 3    fluorouracil (EFUDEX) 5 % cream Apply to the forehead, temples and sides of the cheeks 2 times daily for 14 days. 40 g 0    JUBLIA 10 % SOLN APPLY TO THE RIGHT BIG TONAIL AND 4TH TOENAIL ONCE A DAY AFTER BATHING 4 mL 0    Vitamin D (CHOLECALCIFEROL) 1000 UNITS CAPS capsule Take 1,000 Units by mouth daily.  aspirin 81 MG EC tablet Take 81 mg by mouth M,W,F only       No current facility-administered medications for this visit.          NUTRITION ASSESSMENT    Biochemical Data:    Lab Results   Component Value Date    LABA1C 6.5 08/03/2021     Lab Results   Component Value Date    .9 08/03/2021       Lab Results   Component Value Date    CHOL 145 09/10/2020    CHOL 141 07/25/2019    CHOL 132 08/27/2015     Lab Results   Component Value Date    TRIG 101 09/10/2020    TRIG 130 07/25/2019    TRIG 98 08/27/2015     Lab Results   Component Value Date    HDL 52 09/10/2020    HDL 49 07/25/2019    HDL 59 08/27/2015     Lab Results   Component Value Date    LDLCALC 73 09/10/2020    LDLCALC 66 07/25/2019    LDLCALC 53 08/27/2015     Lab Results   Component Value Date    LABVLDL 20 09/10/2020    LABVLDL 26 07/25/2019    LABVLDL 20 08/27/2015     No results found for: [facility]    Lab Results   Component Value Date    WBC 6.5 08/03/2021    HGB 13.4 (L) 08/03/2021    HCT 39.8 (L) 08/03/2021    MCV 91.8 08/03/2021     08/03/2021       Lab Results   Component Value Date    CREATININE 0.8 08/03/2021    BUN 15 08/03/2021     08/03/2021    K 4.5 08/03/2021     08/03/2021    CO2 23 08/03/2021       Diabetes Medications: No  Knows name and dose of prescribed medications No  Knows prescribed schedule for medicationsYes  Recent change in medication type/dosage: Metformin and Lisinopril both new  Stores  medications properlyYes  Comments:     Monitoring:   Has BG meter: No  Hypoglycemia? No    Anthropometric Measurements: Wt:   Wt Readings from Last 3 Encounters:   08/03/21 180 lb 8 oz (81.9 kg)   05/03/21 190 lb (86.2 kg)   02/03/21 201 lb (91.2 kg)      BMI:   BMI Readings from Last 3 Encounters:   08/03/21 27.44 kg/m²   05/03/21 28.89 kg/m²   02/03/21 30.56 kg/m²   Down 20 lb in past 6 months  Patient's stated goal weight: unable to state  7% Weight loss goal weight: 167 lb  Physical Activity History:   Physical activity: strength training, yard work  Frequency of activity: daily  Duration of activity: varies  Obstacles to activity: pain caused by ankle replacement in October 2020 - tried PT    Sleep: 7 hours nightly - takes Ambien CR    Food and Nutrition History:   Nutrition Awareness/Previous DSMES: no  Number of people in household: 2 - patient does shopping - not much cooking  Frequency of Meals Eaten away from home: most  Food Availability Problems  Within the past 12 months, have you worried that your food would run out before you got money to buy more? No  Within the past 12 months, has the food you bought not lasted till the end of the month and you didn't

## 2021-08-19 ENCOUNTER — OFFICE VISIT (OUTPATIENT)
Dept: DERMATOLOGY | Age: 76
End: 2021-08-19
Payer: MEDICARE

## 2021-08-19 VITALS — TEMPERATURE: 98.8 F

## 2021-08-19 DIAGNOSIS — L57.0 ACTINIC KERATOSIS: Primary | ICD-10-CM
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
## 2021-08-19 DIAGNOSIS — S00.86XA INSECT BITE OF FACE WITH LOCAL REACTION, INITIAL ENCOUNTER: ICD-10-CM

## 2021-08-19 PROCEDURE — 17003 DESTRUCT PREMALG LES 2-14: CPT | Performed by: DERMATOLOGY

## 2021-08-19 PROCEDURE — 17000 DESTRUCT PREMALG LESION: CPT | Performed by: DERMATOLOGY

## 2021-08-19 PROCEDURE — 1036F TOBACCO NON-USER: CPT | Performed by: DERMATOLOGY

## 2021-08-19 PROCEDURE — G8417 CALC BMI ABV UP PARAM F/U: HCPCS | Performed by: DERMATOLOGY

## 2021-08-19 PROCEDURE — 1123F ACP DISCUSS/DSCN MKR DOCD: CPT | Performed by: DERMATOLOGY

## 2021-08-19 PROCEDURE — 99212 OFFICE O/P EST SF 10 MIN: CPT | Performed by: DERMATOLOGY

## 2021-08-19 PROCEDURE — 4040F PNEUMOC VAC/ADMIN/RCVD: CPT | Performed by: DERMATOLOGY

## 2021-08-19 PROCEDURE — 3017F COLORECTAL CA SCREEN DOC REV: CPT | Performed by: DERMATOLOGY

## 2021-08-19 PROCEDURE — G8427 DOCREV CUR MEDS BY ELIG CLIN: HCPCS | Performed by: DERMATOLOGY

## 2021-08-19 RX ORDER — LISINOPRIL 10 MG/1
TABLET ORAL
COMMUNITY
Start: 2021-08-05

## 2021-08-19 NOTE — PROGRESS NOTES
[facility]  Gorge Leon MD  [phone]      Rivasrosalind Campos  1945    70 y.o. male     Date of Visit: 8/19/2021    Chief Complaint: skin lesions    History of Present Illness:    1. Follow up for a hx of AKs - just completed a 2 week course of Efudex on the forehead. Has a couple of asymptomatic lesions on the scalp and face today. 2.  He also complains of an acute onset tender and pruritic lesion on the right cheek. He has a history of numerous BCCs     Superficial basal cell carcinoma of the left upper chest-treated with curettage on 3/5/2021. Nodular BCC left anterior shoulder-treated with electrodesiccation and curettage on 3/5/2021. Metatypical BCC on the right forehead-treated with Mohs by Dr. Ranjan Young on 1/27/2021.     Small nodular BCC on the right superior shoulder-treated with curettage at the time of biopsy on 11/17/2020. Nodular BCC of the right mid vertex scalp-treated with Mohs by Dr. Ranjan Young on 3/11/2020.     Small nodular BCCs on the right anterior upper arm and right mid arm-treated with curettage at the time of biopsy on 2/26/2019.     Nodular BCC on the left upper chest history with curettage at the time of biopsy in October 2018. Superficial BCC of the right supraclavicular region-treated with curettage at time of biopsy on 1/3/2018.     Nodular BCC on the right thigh - excised on 6/23/17.    Superficial BCC on the right central upper back - treated with curettage on 3/28/17. Nodular BCC on the left central lower back - treated with curettage on 3/28/17.     Nodular BCC on the right mid thigh-treated with curettage on 10/7/2016. Superficial basal cell carcinoma left upper chest-treated with curettage on 9/21/2016. Nodular BCC of the right mid central back-treated with curettage on 9/21/2016.   Superficial BCC of the left central lower back-treated with curettage on 3/3/2016.     4 BCCs treated with ED&C on 9/2015.    Nodular BCC on the right lateral shoulder, superior  Nodular basal cell carcinoma on the right lateral shoulder, inferior  Superficial basal cell carcinoma of the right upper back  Superficial basal cell carcinoma of the central lower back      Bowen's disease of the left temple-treated with Efudex cream b.i.d. for 4 weeks.    Superficial BCC, right posterior lateral arm-treated with curettage 3/7/14. BCC, right upper arm-excised 3/7/2014. BCC on the nasal dorsum (biopsy 10/2013) status post Mohs micrographic surgery in December 2013. Review of Systems:  Gen: Feels well, good sense of health. Past Medical History, Family History, Surgical History, Medications and Allergies reviewed.     Past Medical History:   Diagnosis Date    Anxiety     BPH (benign prostatic hyperplasia) 5/31/2012    CAD (coronary artery disease)     Cancer (HCC)     SKIN    Hyperlipidemia     LBBB (left bundle branch block) 5/31/2012    Primary insomnia 10/31/2019    Sleep apnea 2011    USE CPAP     Past Surgical History:   Procedure Laterality Date    ANKLE FRACTURE SURGERY      ANKLE SURGERY Left 10/1/2020    REMOVAL OF HARDWARE LEFT ANKLE performed by Iliana Nettles MD at [address] [place] [address]  2006    MOHS SURGERY Right 03/11/2020    Right Mid Vertex Scalp    MOHS SURGERY  01/27/2021    R Forehead    TOTAL ANKLE ARTHROPLASTY Left 10/1/2020    LEFT ANKLE REPLACEMENT, GASTROCNEMIUS RECESSION; performed by Iliana Nettles MD at [address] [place] [address] [facility]       No Known Allergies  Outpatient Medications Marked as Taking for the 8/19/21 encounter (Office Visit) with Rj Lopez MD   Medication Sig Dispense Refill    lisinopril (PRINIVIL;ZESTRIL) 10 MG tablet TAKE 1 TABLET BY MOUTH EVERY MORNING      metFORMIN (GLUCOPHAGE-XR) 500 MG extended release tablet TAKE 1 TABLET BY MOUTH DAILY WITH BREAKFAST 90 tablet 3    zolpidem (AMBIEN CR) 12.5 MG extended release tablet TAKE ONE TABLET BY MOUTH NIGHTLY AS NEEDED FOR SLEEP- KAMARI 90 tablet 3    rosuvastatin (CRESTOR) 20 MG tablet TAKE 1 TABLET BY MOUTH  DAILY 90 tablet 3    clopidogrel (PLAVIX) 75 MG tablet TAKE 1 TABLET BY MOUTH  DAILY 90 tablet 3    LORazepam (ATIVAN) 0.5 MG tablet Take 1 tablet by mouth every 8 hours as needed for Anxiety for up to 90 days. 270 tablet 0    tamsulosin (FLOMAX) 0.4 MG capsule TAKE 1 CAPSULE BY MOUTH  DAILY 90 capsule 3    olmesartan (BENICAR) 20 MG tablet Take 1 tablet by mouth      omeprazole (PRILOSEC) 20 MG delayed release capsule TAKE 1 CAPSULE BY MOUTH  DAILY (Patient taking differently: Take 20 mg by mouth as needed ) 90 capsule 3    fluorouracil (EFUDEX) 5 % cream Apply to the forehead, temples and sides of the cheeks 2 times daily for 14 days. 40 g 0    JUBLIA 10 % SOLN APPLY TO THE RIGHT BIG TONAIL AND 4TH TOENAIL ONCE A DAY AFTER BATHING 4 mL 0    Vitamin D (CHOLECALCIFEROL) 1000 UNITS CAPS capsule Take 1,000 Units by mouth daily.  aspirin 81 MG EC tablet Take 81 mg by mouth M,W,F only           Physical Examination       The following were examined and determined to be normal: Psych/Neuro, Conjunctivae/eyelids, Gums/teeth/lips, Neck, Breast/axilla/chest, Abdomen, Back, RUE, LUE, RLE, LLE and Nails/digits. The following were examined and determined to be abnormal: Scalp/hair and Head/face. Well appearing. 1.  Crown of the scalp - 2, left lateral brow - 1: few keratotic pink macules. 2.  Right superolateral cheek - crusted erythematous edematous papule. Assessment and Plan     1. Actinic keratoses - few, improved with Efudex    2 cycles of liquid nitrogen applied to 3 AKs: Crown of the scalp - 2, left lateral brow - 1. Patient was educated regarding the potential risks of blister formation and discomfort. Wound care was discussed. 2. Insect bite of face with local reaction, initial encounter     Eucrisa ointment twice daily until improved. Return in about 3 months (around 11/19/2021).     --Notwane Reed Do Perkins MD

## 2021-09-16 DIAGNOSIS — F51.01 PRIMARY INSOMNIA: ICD-10-CM

## 2021-09-17 DIAGNOSIS — F41.9 ANXIETY: Primary | ICD-10-CM

## 2021-09-17 RX ORDER — LORAZEPAM 0.5 MG/1
TABLET ORAL
Qty: 270 TABLET | Refills: 0 | Status: SHIPPED | OUTPATIENT
Start: 2021-09-17 | End: 2021-10-17

## 2021-09-17 RX ORDER — LORAZEPAM 0.5 MG/1
0.5 TABLET ORAL EVERY 8 HOURS PRN
Qty: 270 TABLET | Refills: 0 | Status: SHIPPED | OUTPATIENT
Start: 2021-09-17 | End: 2021-11-01 | Stop reason: SDUPTHER

## 2021-11-01 ENCOUNTER — OFFICE VISIT (OUTPATIENT)
Dept: INTERNAL MEDICINE CLINIC | Age: 76
End: 2021-11-01
Payer: MEDICARE

## 2021-11-01 VITALS — BODY MASS INDEX: 28.13 KG/M2 | WEIGHT: 185 LBS | DIASTOLIC BLOOD PRESSURE: 86 MMHG | SYSTOLIC BLOOD PRESSURE: 134 MMHG

## 2021-11-01 DIAGNOSIS — Z95.5 S/P CORONARY ARTERY STENT PLACEMENT: ICD-10-CM

## 2021-11-01 DIAGNOSIS — E11.9 TYPE 2 DIABETES MELLITUS TREATED WITHOUT INSULIN (HCC): ICD-10-CM

## 2021-11-01 DIAGNOSIS — F51.01 PRIMARY INSOMNIA: ICD-10-CM

## 2021-11-01 DIAGNOSIS — F41.9 ANXIETY: ICD-10-CM

## 2021-11-01 DIAGNOSIS — E11.9 TYPE 2 DIABETES MELLITUS WITHOUT COMPLICATION, WITHOUT LONG-TERM CURRENT USE OF INSULIN (HCC): Primary | ICD-10-CM

## 2021-11-01 PROCEDURE — 3017F COLORECTAL CA SCREEN DOC REV: CPT | Performed by: INTERNAL MEDICINE

## 2021-11-01 PROCEDURE — 1123F ACP DISCUSS/DSCN MKR DOCD: CPT | Performed by: INTERNAL MEDICINE

## 2021-11-01 PROCEDURE — 3044F HG A1C LEVEL LT 7.0%: CPT | Performed by: INTERNAL MEDICINE

## 2021-11-01 PROCEDURE — 99213 OFFICE O/P EST LOW 20 MIN: CPT | Performed by: INTERNAL MEDICINE

## 2021-11-01 PROCEDURE — 2022F DILAT RTA XM EVC RTNOPTHY: CPT | Performed by: INTERNAL MEDICINE

## 2021-11-01 PROCEDURE — 1036F TOBACCO NON-USER: CPT | Performed by: INTERNAL MEDICINE

## 2021-11-01 PROCEDURE — 4040F PNEUMOC VAC/ADMIN/RCVD: CPT | Performed by: INTERNAL MEDICINE

## 2021-11-01 PROCEDURE — G8428 CUR MEDS NOT DOCUMENT: HCPCS | Performed by: INTERNAL MEDICINE

## 2021-11-01 PROCEDURE — G8484 FLU IMMUNIZE NO ADMIN: HCPCS | Performed by: INTERNAL MEDICINE

## 2021-11-01 PROCEDURE — G8417 CALC BMI ABV UP PARAM F/U: HCPCS | Performed by: INTERNAL MEDICINE

## 2021-11-01 RX ORDER — LORAZEPAM 0.5 MG/1
0.5 TABLET ORAL EVERY 8 HOURS PRN
Qty: 270 TABLET | Refills: 0 | Status: SHIPPED | OUTPATIENT
Start: 2021-11-01 | End: 2022-03-15

## 2021-11-01 RX ORDER — ZOLPIDEM TARTRATE 12.5 MG/1
TABLET, FILM COATED, EXTENDED RELEASE ORAL
Qty: 90 TABLET | Refills: 3 | Status: SHIPPED | OUTPATIENT
Start: 2021-11-01 | End: 2022-02-01 | Stop reason: SDUPTHER

## 2021-11-01 NOTE — PROGRESS NOTES
CHIEF COMPLAINT: Jakub Conti is a 70 y.o. male who presents for : Follow-up anxiety insomnia diabetes    HPI: Patient presented with follow-up of the above his blood sugars have been well controlled there is no evidence of addiction or dependency with regard to his Ativan or his Ambien    Review of Systems:   Constitutional:  Denies fever or chills   Eyes:  Denies change in visual acuity   HENT:  Denies nasal congestion or sore throat   Respiratory:  Denies cough or shortness of breath   Cardiovascular:  Denies chest pain or edema   GI:  Denies abdominal pain, nausea, vomiting, bloody stools or diarrhea   :  Denies dysuria   Musculoskeletal:  Denies back pain or joint pain   Integument:  Denies rash   Neurologic:  Denies headache, focal weakness or sensory changes   Endocrine:  Denies polyuria or polydipsia   Lymphatic:  Denies swollen glands   Psychiatric:  Denies depression or anxiety     Past Medical History:        Diagnosis Date    Anxiety     BPH (benign prostatic hyperplasia) 5/31/2012    CAD (coronary artery disease)     Cancer ([facility] [address]     SKIN    Hyperlipidemia     LBBB (left bundle branch block) 5/31/2012    Primary insomnia 10/31/2019    Sleep apnea 2011    USE CPAP       Past Surgical History:        Procedure Laterality Date    ANKLE FRACTURE SURGERY      ANKLE SURGERY Left 10/1/2020    REMOVAL OF HARDWARE LEFT ANKLE performed by Radha Aguirre MD at [address]  2006    MOHS SURGERY Right 03/11/2020    Right Mid Vertex Scalp    MOHS SURGERY  01/27/2021    R Forehead    TOTAL ANKLE ARTHROPLASTY Left 10/1/2020    LEFT ANKLE REPLACEMENT, GASTROCNEMIUS RECESSION; performed by Radha Aguirre MD at [address] History:  Family History   Problem Relation Age of Onset    Cancer Mother     Heart Disease Father     Thyroid Disease Brother        Social History:  Social History     Socioeconomic History    Marital status:      Spouse name: Not on file    Number of children: Not on file    Years of education: Not on file    Highest education level: Not on file   Occupational History    Not on file   Tobacco Use    Smoking status: Never Smoker    Smokeless tobacco: Never Used   Vaping Use    Vaping Use: Never used   Substance and Sexual Activity    Alcohol use: Yes     Alcohol/week: 1.7 standard drinks     Types: 2 Standard drinks or equivalent per week     Comment: OCC    Drug use: Never    Sexual activity: Not on file   Other Topics Concern    Not on file   Social History Narrative    Not on file     Social Determinants of Health     Financial Resource Strain: Low Risk     Difficulty of Paying Living Expenses: Not hard at all   Food Insecurity: No Food Insecurity    Worried About [address] in the Last Year: Never true    [facility] [employer] in the Last Year: Never true   Transportation Needs:     Lack of Transportation (Medical):  Lack of Transportation (Non-Medical):    Physical Activity:     Days of Exercise per Week:     Minutes of Exercise per Session:    Stress:     Feeling of Stress :    Social Connections:     Frequency of Communication with Friends and Family:     Frequency of Social Gatherings with Friends and Family:     Attends Evangelical Services:     Active Member of Clubs or Organizations:     Attends Club or Organization Meetings:     Marital Status:    Intimate Partner Violence:     Fear of Current or Ex-Partner:     Emotionally Abused:     Physically Abused:     Sexually Abused: Allergies:  Patient has no known allergies. Current Medications:    Prior to Admission medications    Medication Sig Start Date End Date Taking? Authorizing Provider   LORazepam (ATIVAN) 0.5 MG tablet Take 1 tablet by mouth every 8 hours as needed for Anxiety for up to 90 days.  11/1/21 1/30/22 Yes Willi Saucedo MD   zolpidem (AMBIEN CR) 12.5 MG extended release tablet TAKE ONE TABLET BY MOUTH NIGHTLY AS NEEDED FOR SLEEP- KAMARI 11/1/21 2/9/22 Yes Casimiro Cervantes MD   lisinopril (PRINIVIL;ZESTRIL) 10 MG tablet TAKE 1 TABLET BY MOUTH EVERY MORNING 8/5/21   Historical Provider, MD   metFORMIN (GLUCOPHAGE-XR) 500 MG extended release tablet TAKE 1 TABLET BY MOUTH DAILY WITH BREAKFAST 8/3/21   Casimiro Cervantes MD   rosuvastatin (CRESTOR) 20 MG tablet TAKE 1 TABLET BY MOUTH  DAILY 7/8/21   Casimiro Cervantes MD   clopidogrel (PLAVIX) 75 MG tablet TAKE 1 TABLET BY MOUTH  DAILY 7/8/21   Casimiro Cervantes MD   tamsulosin [facility]) 0.4 MG capsule TAKE 1 CAPSULE BY MOUTH  DAILY 1/12/21   Casimiro Cervantes MD   olmesartan (BENICAR) 20 MG tablet Take 1 tablet by mouth 9/23/20   Historical Provider, MD   omeprazole (PRILOSEC) 20 MG delayed release capsule TAKE 1 CAPSULE BY MOUTH  DAILY  Patient taking differently: Take 20 mg by mouth as needed  8/18/20   Casimiro Cervantes MD   fluorouracil (EFUDEX) 5 % cream Apply to the forehead, temples and sides of the cheeks 2 times daily for 14 days. 8/10/20   Joey Garcia MD   JUBLIA 10 % SOLN APPLY TO THE RIGHT BIG TONAIL AND 4TH TOENAIL ONCE A DAY AFTER BATHING 8/16/18   Joey Garcia MD   Vitamin D (CHOLECALCIFEROL) 1000 UNITS CAPS capsule Take 1,000 Units by mouth daily. Historical Provider, MD   aspirin 81 MG EC tablet Take 81 mg by mouth M,W,F only    Historical Provider, MD       Physical Exam:  Vital Signs: /86 (Site: Left Upper Arm, Position: Sitting, Cuff Size: Large Adult)   Wt 185 lb (83.9 kg)   BMI 28.13 kg/m²   General: Patient appears  non-toxic  HENT: Atraumatic, normocephalic, oral mucosa moist  Lungs:  Clear bilaterally  Heart: Regular rate and rhythm  Abdomen: Non-distended, soft, non-tender  Extremities: No edema  Neuro: Nonfocal    Medical Decision Making and Plan:  Pertinent Labs & Imaging studies reviewed. (See chart for details)      1.  Type 2 diabetes mellitus without complication, without long-term current use of insulin (HCC)  Problem is stable continue Metformin and follow-up in 3 months for labs    2. S/P coronary artery stent placement  Problem is stable followed by cardiology    3. Anxiety  Problem is stable no evidence of addiction dependency will continue present meds    4. Primary insomnia  This problem is stable there is no evidence of addiction or dependency  - LORazepam (ATIVAN) 0.5 MG tablet; Take 1 tablet by mouth every 8 hours as needed for Anxiety for up to 90 days. Dispense: 270 tablet; Refill: 0  - zolpidem (AMBIEN CR) 12.5 MG extended release tablet; TAKE ONE TABLET BY MOUTH NIGHTLY AS NEEDED FOR SLEEP- KAMARI  Dispense: 90 tablet;  Refill: 3

## 2021-11-22 ENCOUNTER — OFFICE VISIT (OUTPATIENT)
Dept: DERMATOLOGY | Age: 76
End: 2021-11-22
Payer: MEDICARE

## 2021-11-22 VITALS — TEMPERATURE: 97.2 F

## 2021-11-22 DIAGNOSIS — L82.1 SK (SEBORRHEIC KERATOSIS): ICD-10-CM

## 2021-11-22 DIAGNOSIS — L24.9 IRRITANT DERMATITIS: Primary | ICD-10-CM

## 2021-11-22 DIAGNOSIS — L57.0 AK (ACTINIC KERATOSIS): ICD-10-CM

## 2021-11-22 DIAGNOSIS — Z85.828 HISTORY OF BASAL CELL CARCINOMA: ICD-10-CM

## 2021-11-22 DIAGNOSIS — L81.4 SOLAR LENTIGO: ICD-10-CM

## 2021-11-22 PROCEDURE — 1123F ACP DISCUSS/DSCN MKR DOCD: CPT | Performed by: DERMATOLOGY

## 2021-11-22 PROCEDURE — 17000 DESTRUCT PREMALG LESION: CPT | Performed by: DERMATOLOGY

## 2021-11-22 PROCEDURE — 4040F PNEUMOC VAC/ADMIN/RCVD: CPT | Performed by: DERMATOLOGY

## 2021-11-22 PROCEDURE — G8484 FLU IMMUNIZE NO ADMIN: HCPCS | Performed by: DERMATOLOGY

## 2021-11-22 PROCEDURE — G8417 CALC BMI ABV UP PARAM F/U: HCPCS | Performed by: DERMATOLOGY

## 2021-11-22 PROCEDURE — 1036F TOBACCO NON-USER: CPT | Performed by: DERMATOLOGY

## 2021-11-22 PROCEDURE — 3017F COLORECTAL CA SCREEN DOC REV: CPT | Performed by: DERMATOLOGY

## 2021-11-22 PROCEDURE — 99213 OFFICE O/P EST LOW 20 MIN: CPT | Performed by: DERMATOLOGY

## 2021-11-22 PROCEDURE — G8427 DOCREV CUR MEDS BY ELIG CLIN: HCPCS | Performed by: DERMATOLOGY

## 2021-11-22 RX ORDER — TRIAMCINOLONE ACETONIDE 1 MG/G
CREAM TOPICAL
Qty: 30 G | Refills: 2 | Status: SHIPPED | OUTPATIENT
Start: 2021-11-22

## 2021-12-08 RX ORDER — TAMSULOSIN HYDROCHLORIDE 0.4 MG/1
CAPSULE ORAL
Qty: 90 CAPSULE | Refills: 3 | Status: SHIPPED | OUTPATIENT
Start: 2021-12-08

## 2021-12-28 NOTE — PROGRESS NOTES
Consults    History Of Present Illness  Patient presents hospital shortness of breath.  Patient is a 66-year-old female with history of coronary artery disease, and chronic diastolic congestive heart failure who presents to the hospital with shortness of breath.  She states her shortness of breath started 4 days ago and progressively gotten worse.  She is not been compliant with fluid restriction.  Over the weekend her Bumex was increased to 2 mg, but she states that did not help with her situation.  She does state some increasing abdominal bloating, increasing lower extremity edema.  She then presented to the emergency room and her BNP is elevated at 16,000.  Her EKG did not show any acute changes, and her troponin was negative.  She did have some increasing shortness of breath, she was started on BiPAP.  In addition she is currently on diuretic 3 mg IV push every 6 hours x4 doses.    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   • Blood clot associated with vein wall inflammation    • Chronic kidney disease    • Chronic pain     BLE   • Congestive cardiac failure (CMS/[facility])    • Coronary artery disease    • Depression    • Essential (primary) hypertension    • Failed moderate sedation during procedure    • High cholesterol    • History of blood transfusion 01/2021   • History of renal dialysis 01/2021   • Kidney failure    • Malignant neoplasm (CMS/[facility])     found lung nodule in sept; rescan in December   • Myocardial infarction (CMS/[facility])    • Pulmonary edema    • Shortness of breath    • Wears dentures    • Wears eyeglasses    COPD  Hypertension  Hyperlipidemia  Coronary artery disease status post coronary artery bypass  MOHS PROCEDURE NOTE    PHYSICIAN:  Lucia Gupta. Dashawn Peralta MD    ASSISTANT: Caron Espinosa RN     REFERRING PROVIDER:   Kannan Cerda MD      PREOPERATIVE DIAGNOSIS: Nodular Basal Cell Carcinoma     SPECIFIC MOHS INDICATIONS:  location    AUC SCORIN/9    POSTOPERATIVE DIAGNOSIS: SAME    LOCATION: right mid vertex scalp    OPERATIVE PROCEDURE:  MOHS MICROGRAPHIC SURGERY    RECONSTRUCTION OF DEFECT: Intermediate layered closure    PREOPERATIVE SIZE: 8x6 MM    DEFECT SIZE: 14x9 MM    LENGTH OF REPAIRED WOUND/SIZE OF FLAP/SIZE OF GRAFT:  31 MM    ANESTHESIA:  5mL 1% lidocaine with epinephrine 1:100,000 buffered. EBL:  MINIMAL    DURATION OF PROCEDURE:  1 HOURS    POSTOPERATIVE OBSERVATION: 0.5 HOUR    SPECIMENS:  SEE MOHS MAP    COMPLICATIONS:  NONE    DESCRIPTION OF PROCEDURE:  The patient was given a mirror, as appropriate, and the biopsy site was identified, marked with a surgical marking pen, and verified by the patient. Options for treatment were discussed and the patient was informed that Mohs surgery was the selected treatment based on its lower recurrence rate, given the features listed above, as compared to other treatment modalities such as excision, radiation, or curettage, and agreed with this treatment plan. Risks and benefits including bruising, swelling, bleeding, infection, nerve injury, recurrence, and scarring were discussed with the patient prior to the procedure and a written consent detailing these and other risks was reviewed with the patient and signed. There was a time out for person and procedure verification. The surgical site was prepped with an antiseptic solution. Application of an antiseptic solution was repeated before each surgical stage. Stage I:  The clinically-apparent tumor was carefully defined and debulked, determining the edge of the surgical excision.     A thin layer of tumor-laden tissue was excised with a narrow margin of normal-appearing skin, using the technique grafting 2004 drug-eluting stent x2 to the saphenous vein graft of the PDA.  In addition stenting with drug-eluting stent of the saphenous vein graft to the obtuse marginal.  LIMA to the LAD was patent  Subclavian stent   Status post renal artery stenting of the left  Mitral valve clip   AVNRT status post ablation 2021  Left atrial appendage occluder-watchman  transesophageal echo showed ejection fraction 60 to 65% watchman had no leak.  Mild aortic insufficiency, mitral valve clip with mild mitral stenosis with a mean gradient of 6 mmHg.  Moderate mitral regurgitation.  Moderate tricuspid regurgitation, peak systolic pulmonary pressure 74 mmHg  GI bleeding 2021  Anemia on Procrit followed by hematology  Surgical History  Past Surgical History:   Procedure Laterality Date   • Cabg, artery-vein, three     • Cath lab case      stent x 6    • Egd  2021   • Egd  2021   • Enteroscopy  2020   • Ep study/svt ablation  2021   • Mitral valve surgery  2020    CLIP   • Renal artery stent Left 2019    twice   • Subclavian artery stent     • Watchman patch mitral valve  2021   Coronary artery bypass grafting     Social History  Social History     Tobacco Use   • Smoking status: Former Smoker     Packs/day: 1.00     Years: 40.00     Pack years: 40.00     Types: Cigarettes     Quit date:      Years since quittin.9   • Smokeless tobacco: Never Used   • Tobacco comment: quit 4.5 years ago , 40 Pk years per note May 2021   Substance Use Topics   • Alcohol use: Not Currently   • Drug use: No       Family History  Family History   Problem Relation Age of Onset   • Hypertension Mother    • Cancer Mother         brain cancer   • Hypertension Sister    • Diabetes Maternal Grandmother         Allergies  ALLERGIES:  Iodinated diagnostic agents, Morphine, and Contrast media    Medications  Medications Prior to Admission   Medication Sig  of Mohs. A map was prepared to correspond to the area of skin from which it was excised. Hemostasis was achieved using electrosurgery. The wound was bandaged. The tissue was prepared for the cryostat and sectioned. 1 section(s) prepared. Each section was coded, cut, and stained for microscopic examination. The entire base and margins of the excised piece of tissue were examined by the surgeon. No tumor was identified at the peripheral margins of stage I of microscopically controlled surgery. DEFECT MANAGEMENT:    REPAIR DESCRIPTION:  Various closure modalities were discussed with the patient, and it was decided that an intermediate layered repair would best preserve normal anatomic and functional relationships. Additional risk of wound dehiscence was discussed. The area was anesthetized with 1% lidocaine with epinephrine 1:100,000 buffered, was given a sterile prep using Chlorhexidine gluconate 4% solution and draped in the usual sterile fashion. Recreation and enlargement of the wound was performed by excising cones of tissue via the triangulation technique. The final incision lines were placed with respect for the patient's natural skin tension lines in a linear configuration to avoid functional and aesthetic distortion of adjacent free margins. Following minimal undermining, meticulous hemostasis was obtained with spot monopolar electrocoagulation. Subcutaneous dead space and dermis were closed using 4-0 Vicryl buried subcutaneous interrupted suture and the epidermis was approximated with 4-0 Prolene simple interrupted stitches. WOUND COVERAGE:  The wound was cleaned with normal saline solution, dried off, Aquaphor ointment was applied, and the wound was covered. A dressing was applied for stabilization and light pressure. The patient was given detailed oral and written instructions on postoperative care. There were no complications.   The patient left the Unit in Dispense Refill   • Acetaminophen (Tylenol) 325 MG Cap Take 650 mg by mouth every 8 hours as needed. for mild to moderate pain     • bumetanide (BUMEX) 2 MG tablet Take 0.5 tablets by mouth daily. 30 tablet 0   • ergocalciferol (DRISDOL) 1.25 mg (50,000 units) capsule Take 1 capsule by mouth every 14 days. Every other Saturday 2 capsule 0   • epoetin babar (Procrit) 99420 UNIT/ML injection Inject into the skin 1 day a week. Thursdays     • amoxicillin (AMOXIL) 500 MG capsule Take 2,000 mg by mouth as needed. Prior to dental procedures     • ezetimibe (ZETIA) 10 MG tablet Take 1 tablet by mouth nightly.     • ferrous sulfate 325 (65 FE) MG tablet Take 1 tablet by mouth 2 times daily (with meals).  0   • pantoprazole (PROTONIX) 20 MG tablet Take 1 tablet by mouth daily.     • isosorbide mononitrate (IMDUR) 30 MG 24 hr tablet Take 30 mg by mouth every morning.     • ARIPiprazole (Abilify) 15 MG tablet Take 15 mg by mouth daily.     • carvedilol (COREG) 6.25 MG tablet Take 1 tablet by mouth every 12 hours. 60 tablet 0   • nitroGLYcerin (NITROSTAT) 0.4 MG sublingual tablet Place 0.4 mg under the tongue every 5 minutes as needed for Chest pain.     • rosuvastatin (CRESTOR) 40 MG tablet Take 40 mg by mouth at bedtime.      • sertraline (ZOLOFT) 100 MG tablet Take 100 mg by mouth daily.     • spironolactone (ALDACTONE) 25 MG tablet Take 25 mg by mouth 2 times daily.         REVIEW OF SYSTEMS:    Constitutional:  Patient denies fever, chills, tiredness or malaise.    Eyes:  Denies change in visual acuity, pain, burning or itching.    HENT:  Denies sinus problems, ear infections, nasal congestion or sore throat.    Respiratory:  See HPI  Cardiovascular:  See HPI  Gastrointestinal:  Denies abdominal pain, nausea, vomiting, bloody stools or diarrhea.    Genitourinary:  Denies urine retention, urinary frequency, blood in urine or nocturia.    Musculoskeletal:  Denies back pain, neck pain, joint pain or leg swelling.     Integument:  Denies rash  Neurologic:  Denies headache, focal weakness or sensory changes.    Endocrine:  Denies  temperature intolerance.    Lymphatic:  Denies  weight loss.            Last Recorded Vitals  VITAL SIGNS:     Vital Last Value 24 Hour Range   Temperature 98.6 °F (37 °C) (12/28/21 0700) Temp  Min: 97.5 °F (36.4 °C)  Max: 98.6 °F (37 °C)   Pulse 99 (12/28/21 0831) Pulse  Min: 76  Max: 99   Respiratory 17 (12/28/21 0831) Resp  Min: 16  Max: 30   Non-Invasive  Blood Pressure 113/51 (12/28/21 0800) BP  Min: 113/51  Max: 145/58   Pulse Oximetry 95 % (12/28/21 0831) SpO2  Min: 88 %  Max: 100 %     Vital Today Admitted   Weight 96.8 kg (213 lb 6.5 oz) (12/28/21 0200) Weight: 90.7 kg (200 lb) (12/27/21 2230)   Height N/A Height: 5' 7\" (170.2 cm) (12/27/21 2230)   Body Mass Index N/A BMI (Calculated): 31.32 (12/27/21 2230)       PHYSICAL EXAM:    Constitutional:  In no acute distress.  Head & Face: Conjunctiva pink, sclera white. Eyelids normal. Oral mucosa pink. No JVD.   Cardiovascular: RRR, S1 & S2 present, no murmurs, rubs or gallops. PMI patent. No carotid bruit bilaterally. Abdominal aorta non-palpable. Femoral pulses +2 bilaterally. DP/PT pulses unable to palpate bilaterally.   Respiratory: Even, unlabored respirations. Lungs decreased breath sounds bilaterally   abdomen: Non-tender. No hepatomegaly.   Extremities: No nail clubbing or cyanosis.  1-2+ bilateral lower extremity edema.  Positive thigh edema   Skin: Pink, warm and dry.   Positive chronic venous skin changes  Musculoskeletal: Appropriate muscle strength and tone.   Neuro: Alert and oriented x 3. Appropriate mood and affect.      EKG shows normal sinus rhythm with nonspecific ST-T wave abnormalities       Assessment & Plan   1.  Acute on chronic diastolic congestive heart failure currently on IV diuretic therapy which we will continue.  She will need fluid and salt restrictions.  She is noncompliant at home  2.  Cor pulmonale with  right-sided congestive heart failure secondary to pulmonary hypertension  3.  Coronary artery disease-status post coronary artery bypass grafting and stenting on medical therapy  4.  Paroxysmal atrial fibrillation-currently not on anticoagulation, she is status post watchman procedure  5.  Chronic kidney disease-we will have nephrology on consultation to help with diuretic therapy.  6.  Hypertension-on therapy  7.  Hyperlipidemia-on therapy  8.  Pulmonary pretension-severe  9.  Anemia-she gets Procrit as an outpatient    Thank you very much, any questions please do not hesitate to call

## 2022-01-06 ENCOUNTER — TELEPHONE (OUTPATIENT)
Dept: DERMATOLOGY | Age: 77
End: 2022-01-06

## 2022-01-06 NOTE — TELEPHONE ENCOUNTER
Pt stopped by scheduled on 2/22 states he has a spot on his left leg have some concern states the color is navy blackish blue in color pls return call back @ [phone] to discuss

## 2022-01-07 ENCOUNTER — OFFICE VISIT (OUTPATIENT)
Dept: DERMATOLOGY | Age: 77
End: 2022-01-07
Payer: MEDICARE

## 2022-01-07 VITALS — TEMPERATURE: 97.4 F

## 2022-01-07 DIAGNOSIS — D69.2 PURPURA (HCC): Primary | ICD-10-CM

## 2022-01-07 DIAGNOSIS — D48.5 NEOPLASM OF UNCERTAIN BEHAVIOR OF SKIN: ICD-10-CM

## 2022-01-07 DIAGNOSIS — L57.0 AK (ACTINIC KERATOSIS): ICD-10-CM

## 2022-01-07 DIAGNOSIS — L81.4 SOLAR LENTIGO: ICD-10-CM

## 2022-01-07 PROCEDURE — 3017F COLORECTAL CA SCREEN DOC REV: CPT | Performed by: DERMATOLOGY

## 2022-01-07 PROCEDURE — G8427 DOCREV CUR MEDS BY ELIG CLIN: HCPCS | Performed by: DERMATOLOGY

## 2022-01-07 PROCEDURE — G8484 FLU IMMUNIZE NO ADMIN: HCPCS | Performed by: DERMATOLOGY

## 2022-01-07 PROCEDURE — 99213 OFFICE O/P EST LOW 20 MIN: CPT | Performed by: DERMATOLOGY

## 2022-01-07 PROCEDURE — 1036F TOBACCO NON-USER: CPT | Performed by: DERMATOLOGY

## 2022-01-07 PROCEDURE — 1123F ACP DISCUSS/DSCN MKR DOCD: CPT | Performed by: DERMATOLOGY

## 2022-01-07 PROCEDURE — 4040F PNEUMOC VAC/ADMIN/RCVD: CPT | Performed by: DERMATOLOGY

## 2022-01-07 PROCEDURE — G8417 CALC BMI ABV UP PARAM F/U: HCPCS | Performed by: DERMATOLOGY

## 2022-01-07 PROCEDURE — 11102 TANGNTL BX SKIN SINGLE LES: CPT | Performed by: DERMATOLOGY

## 2022-01-07 NOTE — PATIENT INSTRUCTIONS
DRY SKIN CARE    1. Do not take more than 1 shower or bath each day. Try to spend 10 minutes or less in the shower/bath. Avoid hot showers as this can damage the skin and make the dryness worse. 2. Use a moisturizing or mild soap such Dove (for sensitive skin), Cetaphil (Cleansing Bar,Gentle Body Wash, Restoraderm Soothing Wash), CeraVe Hydrating Body Wash, Eucerin Skin Calming Body Wash, or Aveeno Daily Moisturizing Body Wash. Antibacterial soaps can be too drying. 3. Use soap only on limited areas (face, underarms, groin and buttocks). Try to avoid using soap on the arms, legs, trunk and back. 4. After showering, pat dry with a towel and generously apply a thick moisturizer all over. Use a moisturizer every day even if you are not showering that particular day. 5. If you are able, apply the moisturizer a second time during the day as well. 6. If a prescription cream or ointment has been ordered for you, apply the prescription medication to affected areas after your bath/shower while the skin is still damp, then apply the moisturizer to the remainder of the skin. 7. When it comes to moisturizers, the thicker the better. Ointments (such as vaseline) are thicker than creams, and creams are thicker than lotions. Suggested over-the-counter moisturizers include:    · CeraVe Lotion or Cream  · Cetaphil Lotion or Cream  · Eucerin Advanced Repair Cream, Advanced Repair Lotion, Eczema Relief Cream or Intensive Repair Lotion. · Lubriderm Lotion  · Vaseline (petroleum jelly)  · Aquaphor  · Nabila moisturizing lotion or cream  · Neutrogena Hand Cream  · Aveeno Moisturizing Cream or Lotion  · Curel Ultra Healing   · Vanicream Moisturizing Skin Cream        Biopsy Wound Care Instructions    · Keep the bandage in place for 24 hours. · Cleanse the wound with mild soapy water daily   Gently dry the area.  Apply Vaseline or petroleum jelly to the wound using a cotton tipped applicator.    Cover with a clean bandage.  Repeat this process until the biopsy site is healed.  If you had stitches placed, continue treating the site until the stitches are removed. Remember to make an appointment to return to have your stitches removed by our staff.  You may shower and bathe as usual.       ** Biopsy results generally take around 7 business days to come back. If you have not heard from us by then, please call the office at [phone]. *Please note that biopsy results are released to both the patient and physician at the same time in [address] Please allow time for your physician to review the results. One of our staff members will reach out to you with the results and plan.

## 2022-01-07 NOTE — PROGRESS NOTES
[facility]  Shahab Silveira MD  [phone]      Francy Bowens  1945    78 y.o. male     Date of Visit: 1/7/2022    Chief Complaint: left leg lesion    History of Present Illness:    1. He presents today for a few day history of a newly noted dark lesion on the left thigh. 2.  Follow-up for history of AK's on the upper forehead-improved with use of Efudex cream.  Reports few residual lesions on the left upper forehead. 3.  He has stable pigmented lesions on the extremities-not aware of any changes. 4.  Unknown duration of a persistent pink lesion on the right lower shin. Dermatologic history:    Superficial basal cell carcinoma of the left upper chest-treated with curettage on 3/5/2021. Nodular BCC left anterior shoulder-treated with electrodesiccation and curettage on 3/5/2021. Metatypical BCC on the right forehead-treated with Mohs by Dr. Rosalia Diane on 1/27/2021.     Small nodular BCC on the right superior shoulder-treated with curettage at the time of biopsy on 11/17/2020. Nodular BCC of the right mid vertex scalp-treated with Mohs by Dr. Rosalia Diane on 3/11/2020.     Small nodular BCCs on the right anterior upper arm and right mid arm-treated with curettage at the time of biopsy on 2/26/2019.     Nodular BCC on the left upper chest history with curettage at the time of biopsy in October 2018. Superficial BCC of the right supraclavicular region-treated with curettage at time of biopsy on 1/3/2018.     Nodular BCC on the right thigh - excised on 6/23/17.    Superficial BCC on the right central upper back - treated with curettage on 3/28/17. Nodular BCC on the left central lower back - treated with curettage on 3/28/17.     Nodular BCC on the right mid thigh-treated with curettage on 10/7/2016. Superficial basal cell carcinoma left upper chest-treated with curettage on 9/21/2016. Nodular BCC of the right mid central back-treated with curettage on 9/21/2016.   Superficial BCC of the left central lower back-treated with curettage on 3/3/2016.     4 BCCs treated with ED&C on 9/2015.    Nodular BCC on the right lateral shoulder, superior  Nodular basal cell carcinoma on the right lateral shoulder, inferior  Superficial basal cell carcinoma of the right upper back  Superficial basal cell carcinoma of the central lower back      Bowen's disease of the left temple-treated with Efudex cream b.i.d. for 4 weeks.    Superficial BCC, right posterior lateral arm-treated with curettage 3/7/14. BCC, right upper arm-excised 3/7/2014. BCC on the nasal dorsum (biopsy 10/2013) status post Mohs micrographic surgery in December 2013.         Review of Systems:  Gen: Feels well, good sense of health. Skin: No new or changing moles. Past Medical History, Family History, Surgical History, Medications and Allergies reviewed.     Past Medical History:   Diagnosis Date    Anxiety     BPH (benign prostatic hyperplasia) 5/31/2012    CAD (coronary artery disease)     Cancer (HCC)     SKIN    Hyperlipidemia     LBBB (left bundle branch block) 5/31/2012    Primary insomnia 10/31/2019    Sleep apnea 2011    USE CPAP     Past Surgical History:   Procedure Laterality Date    ANKLE FRACTURE SURGERY      ANKLE SURGERY Left 10/1/2020    REMOVAL OF HARDWARE LEFT ANKLE performed by García Mejia MD at [facility]  2006    MOHS SURGERY Right 03/11/2020    Right Mid Vertex Scalp    MOHS SURGERY  01/27/2021    R Forehead    TOTAL ANKLE ARTHROPLASTY Left 10/1/2020    LEFT ANKLE REPLACEMENT, GASTROCNEMIUS RECESSION; performed by García Mejia MD at [address] [facility] [address]       No Known Allergies  Outpatient Medications Marked as Taking for the 1/7/22 encounter (Office Visit) with Eusebio Turner MD   Medication Sig Dispense Refill    tamsulosin (FLOMAX) 0.4 MG capsule TAKE 1 CAPSULE BY MOUTH  DAILY 90 capsule 3    triamcinolone (KENALOG) 0.1 % cream Apply to affected aresa on the right chest and right thigh twice daily for up to 2 weeks or until improved. 30 g 2    LORazepam (ATIVAN) 0.5 MG tablet Take 1 tablet by mouth every 8 hours as needed for Anxiety for up to 90 days. 270 tablet 0    zolpidem (AMBIEN CR) 12.5 MG extended release tablet TAKE ONE TABLET BY MOUTH NIGHTLY AS NEEDED FOR SLEEP- KAMARI 90 tablet 3    lisinopril (PRINIVIL;ZESTRIL) 10 MG tablet TAKE 1 TABLET BY MOUTH EVERY MORNING      metFORMIN (GLUCOPHAGE-XR) 500 MG extended release tablet TAKE 1 TABLET BY MOUTH DAILY WITH BREAKFAST 90 tablet 3    rosuvastatin (CRESTOR) 20 MG tablet TAKE 1 TABLET BY MOUTH  DAILY 90 tablet 3    clopidogrel (PLAVIX) 75 MG tablet TAKE 1 TABLET BY MOUTH  DAILY 90 tablet 3    olmesartan (BENICAR) 20 MG tablet Take 1 tablet by mouth      omeprazole (PRILOSEC) 20 MG delayed release capsule TAKE 1 CAPSULE BY MOUTH  DAILY (Patient taking differently: Take 20 mg by mouth as needed ) 90 capsule 3    fluorouracil (EFUDEX) 5 % cream Apply to the forehead, temples and sides of the cheeks 2 times daily for 14 days. 40 g 0    JUBLIA 10 % SOLN APPLY TO THE RIGHT BIG TONAIL AND 4TH TOENAIL ONCE A DAY AFTER BATHING 4 mL 0    Vitamin D (CHOLECALCIFEROL) 1000 UNITS CAPS capsule Take 1,000 Units by mouth daily.  aspirin 81 MG EC tablet Take 81 mg by mouth M,W,F only           Physical Examination       The following were examined and determined to be normal: Psych/Neuro, Scalp/hair, Conjunctivae/eyelids, Gums/teeth/lips, Neck, Breast/axilla/chest, Abdomen, Back, RUE, LUE, LLE and Nails/digits. The following were examined and determined to be abnormal: Head/face and RLE. Well-appearing. 1.  Left mid thigh with a round purpuric patch. 2.  Left upper forehead with few keratotic skin colored to pink macules. 3.  Extensor surfaces of the extremities with scattered round to oval smooth brown macules and patches. 4.  Right lower shin with a 7 mm focally crusted pink macule. Assessment and Plan     1. Purpura     Reassurance. 2. AK (actinic keratosis) - few remaining    Efudex cream twice daily to the residual lesions on the left upper forehead for 14 days. We discussed the likely side effects of treatment including redness, crusting, itching and burning. 3. Solar lentigines    Monitor for change. 4. Neoplasm of uncertain behavior of skin, right lower shin - ? BCC    Discussed possible diagnosis; patient agreeable to biopsy (consent obtained). Risks reviewed including discomfort, bleeding, scar and infection. The area(s) to be biopsied were marked with a surgical pen. Alcohol was used to cleanse the site. Local anesthesia was acheived with 1% lidocaine with epinephrine. Shave biopsy was performed using a razor blade. Hemostasis was achieved with aluminum chloride. The wound(s) were dressed with petrolatum and covered with a bandage. Wound care instructions were reviewed. 1 Specimen (s) sent to pathology. The specimen bottles were appropriately labeled.              --Becky Velasquez MD

## 2022-01-11 ENCOUNTER — TELEPHONE (OUTPATIENT)
Dept: DERMATOLOGY | Age: 77
End: 2022-01-11

## 2022-01-11 LAB — DERMATOLOGY PATHOLOGY REPORT: ABNORMAL

## 2022-01-11 NOTE — TELEPHONE ENCOUNTER
Date of biopsy: 1/7/22  Site of biopsy: Right lower shin  Result: Basal cell carcinoma, superficial     Plan: Return for curettage  Patient scheduled on 1/21/22 at [address] [place] [address].     Reviewed results of the biopsy with the patient. The patient expressed understanding of the plan. Pathology report scanned into patient's chart.

## 2022-01-21 ENCOUNTER — PROCEDURE VISIT (OUTPATIENT)
Dept: DERMATOLOGY | Age: 77
End: 2022-01-21
Payer: MEDICARE

## 2022-01-21 VITALS — TEMPERATURE: 97 F

## 2022-01-21 DIAGNOSIS — C44.712 BCC (BASAL CELL CARCINOMA), LEG, RIGHT: Primary | ICD-10-CM

## 2022-01-21 PROCEDURE — 17261 DSTRJ MAL LES T/A/L .6-1.0CM: CPT | Performed by: DERMATOLOGY

## 2022-01-21 NOTE — PROGRESS NOTES
[facility]  Hans Vick MD  [phone]      Rosario Roberts  1945    78 y.o. male     Date of Visit: 1/21/2022    Chief Complaint: [address]    History of Present Illness:    Here today for treatment of a superficial BCC on the right lower shin. DIAGNOSIS:     RIGHT LOWER SHIN-     Basal Cell Carcinoma, Superficial Type       RESULTS SIGNATURE   Regan Barber MD   Electronic Signature: 11 JAN 2022 08:02 AM       Review of Systems:  Gen: Feels well, good sense of health. Past Medical History, Family History, Surgical History, Medications and Allergies reviewed. Past Medical History:   Diagnosis Date    Anxiety     BPH (benign prostatic hyperplasia) 5/31/2012    CAD (coronary artery disease)     Cancer (HCC)     SKIN    Hyperlipidemia     LBBB (left bundle branch block) 5/31/2012    Primary insomnia 10/31/2019    Sleep apnea 2011    USE CPAP     Past Surgical History:   Procedure Laterality Date    ANKLE FRACTURE SURGERY      ANKLE SURGERY Left 10/1/2020    REMOVAL OF HARDWARE LEFT ANKLE performed by Beni Richard MD at [address]  2006    MOHS SURGERY Right 03/11/2020    Right Mid Vertex Scalp    MOHS SURGERY  01/27/2021    R Forehead    TOTAL ANKLE ARTHROPLASTY Left 10/1/2020    LEFT ANKLE REPLACEMENT, GASTROCNEMIUS RECESSION; performed by Beni Richard MD [address]       No Known Allergies  Outpatient Medications Marked as Taking for the 1/21/22 encounter (Procedure visit) with Carolyne Tesfaye MD   Medication Sig Dispense Refill    tamsulosin (FLOMAX) 0.4 MG capsule TAKE 1 CAPSULE BY MOUTH  DAILY 90 capsule 3    triamcinolone (KENALOG) 0.1 % cream Apply to affected aresa on the right chest and right thigh twice daily for up to 2 weeks or until improved. 30 g 2    LORazepam (ATIVAN) 0.5 MG tablet Take 1 tablet by mouth every 8 hours as needed for Anxiety for up to 90 days.  270 tablet 0    zolpidem (AMBIEN CR) 12.5 MG extended release tablet TAKE ONE TABLET BY MOUTH NIGHTLY AS NEEDED FOR SLEEP- KAMARI 90 tablet 3    lisinopril (PRINIVIL;ZESTRIL) 10 MG tablet TAKE 1 TABLET BY MOUTH EVERY MORNING      metFORMIN (GLUCOPHAGE-XR) 500 MG extended release tablet TAKE 1 TABLET BY MOUTH DAILY WITH BREAKFAST 90 tablet 3    rosuvastatin (CRESTOR) 20 MG tablet TAKE 1 TABLET BY MOUTH  DAILY 90 tablet 3    clopidogrel (PLAVIX) 75 MG tablet TAKE 1 TABLET BY MOUTH  DAILY 90 tablet 3    olmesartan (BENICAR) 20 MG tablet Take 1 tablet by mouth      omeprazole (PRILOSEC) 20 MG delayed release capsule TAKE 1 CAPSULE BY MOUTH  DAILY (Patient taking differently: Take 20 mg by mouth as needed ) 90 capsule 3    fluorouracil (EFUDEX) 5 % cream Apply to the forehead, temples and sides of the cheeks 2 times daily for 14 days. 40 g 0    JUBLIA 10 % SOLN APPLY TO THE RIGHT BIG TONAIL AND 4TH TOENAIL ONCE A DAY AFTER BATHING 4 mL 0    Vitamin D (CHOLECALCIFEROL) 1000 UNITS CAPS capsule Take 1,000 Units by mouth daily.  aspirin 81 MG EC tablet Take 81 mg by mouth M,W,F only             Physical Examination       Well appearing. 1.  Right lower shin - 7 mm focally crusted erythematous macule. Assessment and Plan     1. Superficial BCC (basal cell carcinoma), leg, right - 7 mm    The area to be treated with cleansed with alcohol and marked with surgical marking pen. Local anesthesia was acheived with 1% lidocaine with epinephrine. Sharp curettage was performed in 3 different directions. Hemostasis was obtained with aluminum chloride. The wound was dressed with petrolatum and a bandage. Patient was educated regarding risks including bleeding, discomfort, and risk of recurrence.           --Noah Fontenot MD

## 2022-01-31 ENCOUNTER — TELEPHONE (OUTPATIENT)
Dept: INTERNAL MEDICINE CLINIC | Age: 77
End: 2022-01-31

## 2022-01-31 NOTE — TELEPHONE ENCOUNTER
Patients wife called wants to know if there is a test her  can take to slow the process of his dementia?   info: Yvonne Wilson 1945    Please call and advise

## 2022-02-01 ENCOUNTER — OFFICE VISIT (OUTPATIENT)
Dept: INTERNAL MEDICINE CLINIC | Age: 77
End: 2022-02-01
Payer: MEDICARE

## 2022-02-01 VITALS
DIASTOLIC BLOOD PRESSURE: 82 MMHG | HEIGHT: 68 IN | WEIGHT: 183 LBS | SYSTOLIC BLOOD PRESSURE: 147 MMHG | BODY MASS INDEX: 27.74 KG/M2

## 2022-02-01 DIAGNOSIS — F51.01 PRIMARY INSOMNIA: ICD-10-CM

## 2022-02-01 DIAGNOSIS — E78.00 PURE HYPERCHOLESTEROLEMIA: ICD-10-CM

## 2022-02-01 DIAGNOSIS — E55.9 VITAMIN D DEFICIENCY: ICD-10-CM

## 2022-02-01 DIAGNOSIS — I25.119 CORONARY ARTERY DISEASE INVOLVING NATIVE CORONARY ARTERY WITH ANGINA PECTORIS, UNSPECIFIED WHETHER NATIVE OR TRANSPLANTED HEART (HCC): ICD-10-CM

## 2022-02-01 DIAGNOSIS — E11.9 TYPE 2 DIABETES MELLITUS TREATED WITHOUT INSULIN (HCC): Primary | ICD-10-CM

## 2022-02-01 PROCEDURE — 1123F ACP DISCUSS/DSCN MKR DOCD: CPT | Performed by: INTERNAL MEDICINE

## 2022-02-01 PROCEDURE — 99214 OFFICE O/P EST MOD 30 MIN: CPT | Performed by: INTERNAL MEDICINE

## 2022-02-01 PROCEDURE — 3046F HEMOGLOBIN A1C LEVEL >9.0%: CPT | Performed by: INTERNAL MEDICINE

## 2022-02-01 PROCEDURE — 2022F DILAT RTA XM EVC RTNOPTHY: CPT | Performed by: INTERNAL MEDICINE

## 2022-02-01 PROCEDURE — 4040F PNEUMOC VAC/ADMIN/RCVD: CPT | Performed by: INTERNAL MEDICINE

## 2022-02-01 PROCEDURE — G8417 CALC BMI ABV UP PARAM F/U: HCPCS | Performed by: INTERNAL MEDICINE

## 2022-02-01 PROCEDURE — G8427 DOCREV CUR MEDS BY ELIG CLIN: HCPCS | Performed by: INTERNAL MEDICINE

## 2022-02-01 PROCEDURE — 1036F TOBACCO NON-USER: CPT | Performed by: INTERNAL MEDICINE

## 2022-02-01 PROCEDURE — G8484 FLU IMMUNIZE NO ADMIN: HCPCS | Performed by: INTERNAL MEDICINE

## 2022-02-01 PROCEDURE — 3017F COLORECTAL CA SCREEN DOC REV: CPT | Performed by: INTERNAL MEDICINE

## 2022-02-01 RX ORDER — ZOLPIDEM TARTRATE 12.5 MG/1
TABLET, FILM COATED, EXTENDED RELEASE ORAL
Qty: 90 TABLET | Refills: 3 | Status: SHIPPED | OUTPATIENT
Start: 2022-02-01 | End: 2022-02-01 | Stop reason: SDUPTHER

## 2022-02-01 RX ORDER — ZOLPIDEM TARTRATE 12.5 MG/1
TABLET, FILM COATED, EXTENDED RELEASE ORAL
Qty: 90 TABLET | Refills: 3 | Status: SHIPPED | OUTPATIENT
Start: 2022-02-01 | End: 2022-05-02 | Stop reason: ALTCHOICE

## 2022-02-01 NOTE — PROGRESS NOTES
CHIEF COMPLAINT: Sandra Mccormack is a 78 y.o. male who presents for : Follow-up diabetes insomnia    HPI: Patient presented with follow-up of his diabetes insomnia the Ambien CR seems to be doing okay there is no evidence of addiction or dependency his blood sugars have been well controlled there is no polyuria polydipsia polyphagia    Review of Systems:   Constitutional:  Denies fever or chills   Eyes:  Denies change in visual acuity   HENT:  Denies nasal congestion or sore throat   Respiratory:  Denies cough or shortness of breath   Cardiovascular:  Denies chest pain or edema   GI:  Denies abdominal pain, nausea, vomiting, bloody stools or diarrhea   :  Denies dysuria   Musculoskeletal:  Denies back pain or joint pain   Integument:  Denies rash   Neurologic:  Denies headache, focal weakness or sensory changes   Endocrine:  Denies polyuria or polydipsia   Lymphatic:  Denies swollen glands   Psychiatric:  Denies depression or anxiety     Past Medical History:        Diagnosis Date    Anxiety     BPH (benign prostatic hyperplasia) 5/31/2012    CAD (coronary artery disease)     Cancer ([facility] [address]     SKIN    Hyperlipidemia     LBBB (left bundle branch block) 5/31/2012    Primary insomnia 10/31/2019    Sleep apnea 2011    USE CPAP       Past Surgical History:        Procedure Laterality Date    ANKLE FRACTURE SURGERY      ANKLE SURGERY Left 10/1/2020    REMOVAL OF HARDWARE LEFT ANKLE performed by Eula Young MD at [address] [address]    MOHS SURGERY Right 03/11/2020    Right Mid Vertex Scalp    MOHS SURGERY  01/27/2021    R Forehead    TOTAL ANKLE ARTHROPLASTY Left 10/1/2020    LEFT ANKLE REPLACEMENT, GASTROCNEMIUS RECESSION; performed by Eula Young MD [address]       Family History:  Family History   Problem Relation Age of Onset    Cancer Mother     Heart Disease Father     Thyroid Disease Brother        Social History:  Social History     Socioeconomic History    Marital status:      Spouse name: None    Number of children: None    Years of education: None    Highest education level: None   Occupational History    None   Tobacco Use    Smoking status: Never Smoker    Smokeless tobacco: Never Used   Vaping Use    Vaping Use: Never used   Substance and Sexual Activity    Alcohol use: Yes     Alcohol/week: 1.7 standard drinks     Types: 2 Standard drinks or equivalent per week     Comment: OCC    Drug use: Never    Sexual activity: None   Other Topics Concern    None   Social History Narrative    None     Social Determinants of Health     Financial Resource Strain: Low Risk     Difficulty of Paying Living Expenses: Not hard at all   Food Insecurity: No Food Insecurity    Worried About Running Out of Food in the Last Year: Never true    Elias of Food in the Last Year: Never true   Transportation Needs:     Lack of Transportation (Medical): Not on file    Lack of Transportation (Non-Medical): Not on file   Physical Activity:     Days of Exercise per Week: Not on file    Minutes of Exercise per Session: Not on file   Stress:     Feeling of Stress : Not on file   Social Connections:     Frequency of Communication with Friends and Family: Not on file    Frequency of Social Gatherings with Friends and Family: Not on file    Attends Orthodox Services: Not on file    Active Member of [address] [place] [address] or Organizations: Not on file    Attends Club or Organization Meetings: Not on file    Marital Status: Not on file   Intimate Partner Violence:     Fear of Current or Ex-Partner: Not on file    Emotionally Abused: Not on file    Physically Abused: Not on file    Sexually Abused: Not on file   Housing Stability:     Unable to Pay for Housing in the Last Year: Not on file    Number of [place] in the Last Year: Not on file    Unstable Housing in the Last Year: Not on file         Allergies:  Patient has no known allergies.     Current Medications:    Prior to Admission medications    Medication Sig Start Date End Date Taking? Authorizing Provider   zolpidem (AMBIEN CR) 12.5 MG extended release tablet TAKE ONE TABLET BY MOUTH NIGHTLY AS NEEDED FOR SLEEP- KAMARI 2/1/22 5/12/22 Yes Denver Mannan, MD   tamsulosin (FLOMAX) 0.4 MG capsule TAKE 1 CAPSULE BY MOUTH  DAILY 12/8/21  Yes Denver Mannan, MD   triamcinolone (KENALOG) 0.1 % cream Apply to affected aresa on the right chest and right thigh twice daily for up to 2 weeks or until improved. 11/22/21  Yes Theresa Christensen MD   lisinopril (PRINIVIL;ZESTRIL) 10 MG tablet TAKE 1 TABLET BY MOUTH EVERY MORNING 8/5/21  Yes Historical Provider, MD   metFORMIN (GLUCOPHAGE-XR) 500 MG extended release tablet TAKE 1 TABLET BY MOUTH DAILY WITH BREAKFAST 8/3/21  Yes Denver Mannan, MD   rosuvastatin (CRESTOR) 20 MG tablet TAKE 1 TABLET BY MOUTH  DAILY 7/8/21  Yes Denver Mannan, MD   clopidogrel (PLAVIX) 75 MG tablet TAKE 1 TABLET BY MOUTH  DAILY 7/8/21  Yes Denver Mannan, MD   olmesartan (BENICAR) 20 MG tablet Take 1 tablet by mouth 9/23/20  Yes Historical Provider, MD   omeprazole (PRILOSEC) 20 MG delayed release capsule TAKE 1 CAPSULE BY MOUTH  DAILY  Patient taking differently: Take 20 mg by mouth as needed  8/18/20  Yes Denver Mannan, MD   fluorouracil (EFUDEX) 5 % cream Apply to the forehead, temples and sides of the cheeks 2 times daily for 14 days. 8/10/20  Yes Theresa Christensen MD   JUBLIA 10 % SOLN APPLY TO THE RIGHT BIG TONAIL AND 4TH TOENAIL ONCE A DAY AFTER BATHING 8/16/18  Yes Theresa Christensen MD   Vitamin D (CHOLECALCIFEROL) 1000 UNITS CAPS capsule Take 1,000 Units by mouth daily.    Yes Historical Provider, MD   aspirin 81 MG EC tablet Take 81 mg by mouth M,W,F only   Yes Historical Provider, MD       Physical Exam:  Vital Signs: BP (!) 147/82   Ht 5' 8\" (1.727 m)   Wt 183 lb (83 kg)   BMI 27.83 kg/m²   General: Patient appears  non-toxic  HENT: Atraumatic, normocephalic, oral mucosa moist  Lungs:  Clear bilaterally  Heart: Regular rate and rhythm  Abdomen: Non-distended, soft, non-tender  Extremities: No edema  Neuro: Nonfocal    Medical Decision Making and Plan:  Pertinent Labs & Imaging studies reviewed. (See chart for details)  Fasting blood studies are pending    1. Type 2 diabetes mellitus treated without insulin (HCC)  Diabetes clinically controlled check above labs continue present meds  - CBC Auto Differential; Future  - Comprehensive Metabolic Panel; Future  - Hemoglobin A1C; Future  - Lipid Panel; Future  - zolpidem (AMBIEN CR) 12.5 MG extended release tablet; TAKE ONE TABLET BY MOUTH NIGHTLY AS NEEDED FOR SLEEP- KAMARI  Dispense: 90 tablet; Refill: 3    2. Coronary artery disease involving native coronary artery with angina pectoris, unspecified whether native or transplanted heart ([facility] [address]  Problem is stable continue present meds    3. Vitamin D deficiency  Check above labs    4. Pure hypercholesterolemia  Check screening labs continue diet and exercise  - CBC Auto Differential; Future  - Comprehensive Metabolic Panel; Future  - Hemoglobin A1C; Future  - Lipid Panel; Future    5. Primary insomnia  This problem is stable refill meds no evidence of addiction or dependency  - zolpidem (AMBIEN CR) 12.5 MG extended release tablet; TAKE ONE TABLET BY MOUTH NIGHTLY AS NEEDED FOR SLEEP- KAMARI  Dispense: 90 tablet;  Refill: 3

## 2022-02-09 DIAGNOSIS — E11.9 TYPE 2 DIABETES MELLITUS TREATED WITHOUT INSULIN (HCC): ICD-10-CM

## 2022-02-09 DIAGNOSIS — E78.00 PURE HYPERCHOLESTEROLEMIA: ICD-10-CM

## 2022-02-09 LAB
A/G RATIO: 1.7 (ref 1.1–2.2)
ALBUMIN SERPL-MCNC: 4.2 G/DL (ref 3.4–5)
ALP BLD-CCNC: 56 U/L (ref 40–129)
ALT SERPL-CCNC: 14 U/L (ref 10–40)
ANION GAP SERPL CALCULATED.3IONS-SCNC: 14 MMOL/L (ref 3–16)
AST SERPL-CCNC: 17 U/L (ref 15–37)
BASOPHILS ABSOLUTE: 0 K/UL (ref 0–0.2)
BASOPHILS RELATIVE PERCENT: 0.5 %
BILIRUB SERPL-MCNC: 0.5 MG/DL (ref 0–1)
BUN BLDV-MCNC: 15 MG/DL (ref 7–20)
CALCIUM SERPL-MCNC: 9.2 MG/DL (ref 8.3–10.6)
CHLORIDE BLD-SCNC: 101 MMOL/L (ref 99–110)
CHOLESTEROL, TOTAL: 143 MG/DL (ref 0–199)
CO2: 22 MMOL/L (ref 21–32)
CREAT SERPL-MCNC: 1.1 MG/DL (ref 0.8–1.3)
EOSINOPHILS ABSOLUTE: 0.2 K/UL (ref 0–0.6)
EOSINOPHILS RELATIVE PERCENT: 2.6 %
GFR AFRICAN AMERICAN: >60
GFR NON-AFRICAN AMERICAN: >60
GLUCOSE BLD-MCNC: 119 MG/DL (ref 70–99)
HCT VFR BLD CALC: 40 % (ref 40.5–52.5)
HDLC SERPL-MCNC: 59 MG/DL (ref 40–60)
HEMOGLOBIN: 13.5 G/DL (ref 13.5–17.5)
LDL CHOLESTEROL CALCULATED: 66 MG/DL
LYMPHOCYTES ABSOLUTE: 1.6 K/UL (ref 1–5.1)
LYMPHOCYTES RELATIVE PERCENT: 25.5 %
MCH RBC QN AUTO: 31 PG (ref 26–34)
MCHC RBC AUTO-ENTMCNC: 33.8 G/DL (ref 31–36)
MCV RBC AUTO: 91.6 FL (ref 80–100)
MONOCYTES ABSOLUTE: 0.5 K/UL (ref 0–1.3)
MONOCYTES RELATIVE PERCENT: 8.1 %
NEUTROPHILS ABSOLUTE: 3.9 K/UL (ref 1.7–7.7)
NEUTROPHILS RELATIVE PERCENT: 63.3 %
PDW BLD-RTO: 12.8 % (ref 12.4–15.4)
PLATELET # BLD: 215 K/UL (ref 135–450)
PMV BLD AUTO: 8 FL (ref 5–10.5)
POTASSIUM SERPL-SCNC: 4.4 MMOL/L (ref 3.5–5.1)
RBC # BLD: 4.36 M/UL (ref 4.2–5.9)
SODIUM BLD-SCNC: 137 MMOL/L (ref 136–145)
TOTAL PROTEIN: 6.7 G/DL (ref 6.4–8.2)
TRIGL SERPL-MCNC: 91 MG/DL (ref 0–150)
VLDLC SERPL CALC-MCNC: 18 MG/DL
WBC # BLD: 6.1 K/UL (ref 4–11)

## 2022-02-10 LAB
ESTIMATED AVERAGE GLUCOSE: 142.7 MG/DL
HBA1C MFR BLD: 6.6 %

## 2022-02-17 ENCOUNTER — OFFICE VISIT (OUTPATIENT)
Dept: INTERNAL MEDICINE CLINIC | Age: 77
End: 2022-02-17
Payer: MEDICARE

## 2022-02-17 VITALS
DIASTOLIC BLOOD PRESSURE: 68 MMHG | HEART RATE: 83 BPM | BODY MASS INDEX: 29.35 KG/M2 | SYSTOLIC BLOOD PRESSURE: 142 MMHG | WEIGHT: 193 LBS | OXYGEN SATURATION: 97 %

## 2022-02-17 DIAGNOSIS — N40.1 BENIGN PROSTATIC HYPERPLASIA WITH INCOMPLETE BLADDER EMPTYING: ICD-10-CM

## 2022-02-17 DIAGNOSIS — F51.01 PRIMARY INSOMNIA: ICD-10-CM

## 2022-02-17 DIAGNOSIS — R41.840 DIFFICULTY CONCENTRATING: ICD-10-CM

## 2022-02-17 DIAGNOSIS — I10 PRIMARY HYPERTENSION: Primary | ICD-10-CM

## 2022-02-17 DIAGNOSIS — R39.14 BENIGN PROSTATIC HYPERPLASIA WITH INCOMPLETE BLADDER EMPTYING: ICD-10-CM

## 2022-02-17 DIAGNOSIS — R41.3 MEMORY CHANGE: ICD-10-CM

## 2022-02-17 PROCEDURE — G8427 DOCREV CUR MEDS BY ELIG CLIN: HCPCS | Performed by: INTERNAL MEDICINE

## 2022-02-17 PROCEDURE — 1123F ACP DISCUSS/DSCN MKR DOCD: CPT | Performed by: INTERNAL MEDICINE

## 2022-02-17 PROCEDURE — 3017F COLORECTAL CA SCREEN DOC REV: CPT | Performed by: INTERNAL MEDICINE

## 2022-02-17 PROCEDURE — 99213 OFFICE O/P EST LOW 20 MIN: CPT | Performed by: INTERNAL MEDICINE

## 2022-02-17 PROCEDURE — G8417 CALC BMI ABV UP PARAM F/U: HCPCS | Performed by: INTERNAL MEDICINE

## 2022-02-17 PROCEDURE — 4040F PNEUMOC VAC/ADMIN/RCVD: CPT | Performed by: INTERNAL MEDICINE

## 2022-02-17 PROCEDURE — G8484 FLU IMMUNIZE NO ADMIN: HCPCS | Performed by: INTERNAL MEDICINE

## 2022-02-17 PROCEDURE — 1036F TOBACCO NON-USER: CPT | Performed by: INTERNAL MEDICINE

## 2022-02-17 RX ORDER — ZOLPIDEM TARTRATE 6.25 MG/1
6.25 TABLET, FILM COATED, EXTENDED RELEASE ORAL NIGHTLY PRN
Qty: 7 TABLET | Refills: 0 | Status: SHIPPED | OUTPATIENT
Start: 2022-02-17 | End: 2022-05-02 | Stop reason: ALTCHOICE

## 2022-02-17 NOTE — PROGRESS NOTES
Follow Up Visit  Established Patient Visit    Patient:  Rosalind Johnson                                               : 1945  Age: 78 y.o. MRN: <[id number]>  Date : 2022    CHIEF COMPLAINT: Rosalind Johnson is a 78 y.o. male who presents for : memory issue    1. Memory change  Memory issue with certain aspects of life, mainly remembering names and details. Per pt he has always had difficulty with names. He keeps a calender to remember daily things, and reports that if it were significant things he will remember them. Per daughter, he is ability to remember names is worsening, difficulty with short term memory, attention and focus. He repeats stories form the past. Forget details, forgets and confuses appts. No termor, no shuffling gate    Mother diet of cancer  Dad lived to 80  Sister had strokes and now had dementi    2. Primary insomnia  Been on the ambien X 20 years  It works well for him  Takes it every night  Sleeps with CPAP. Has nocturia and patient has difficulty going back to sleep with a CPAP after he wakes up to be. Ambien really helps with this. 3. Benign prostatic hyperplasia with incomplete bladder emptying  Has some nocturia  Helps with going back to sleep  Last PSA in , 1.07  On tamsulosin  Dribbling, weak stream, incomplete emptying    4.  HTN  Does not regularly check BP at home  Currently on lisinopril 10 mg  Tolerating the dose well  When he checks it at home he said his systolic blood pressure is usually less than 140    Patient Active Problem List    Diagnosis Date Noted    [facility]    Basal cell carcinoma of right forehead 2021    Arthritis of left ankle 10/01/2020    Visit for suture removal 2020    Primary insomnia 10/31/2019    AK (actinic keratosis) 10/07/2013    History of nonmelanoma skin cancer 10/07/2013    Vitamin D deficiency 2012    LBBB (left bundle branch block) 05/31/2012    Benign prostatic hyperplasia with incomplete bladder emptying 05/31/2012    Sleep apnea 11/29/2011    Hyperlipemia 05/24/2011    CAD (coronary artery disease) 05/24/2011    S/P coronary artery stent placement 05/24/2011     Constitutional:  Denies fever or chills   Eyes:  Denies change in visual acuity   HENT:  Denies nasal congestion or sore throat   Respiratory:  Denies cough or shortness of breath   Cardiovascular:  Denies chest pain or edema   GI:  Denies abdominal pain, nausea, vomiting, bloody stools or diarrhea   :  Denies dysuria   Musculoskeletal:  Denies back pain or joint pain   Integument:  Denies rash   Neurologic:  Denies headache, focal weakness or sensory changes   Endocrine:  Denies polyuria or polydipsia   Lymphatic:  Denies swollen glands   Psychiatric:  Denies depression or anxiety     Past Medical History:        Diagnosis Date    Anxiety     BPH (benign prostatic hyperplasia) 5/31/2012    CAD (coronary artery disease)     Cancer (HCC)     SKIN    Hyperlipidemia     LBBB (left bundle branch block) 5/31/2012    Primary insomnia 10/31/2019    Sleep apnea 2011    USE CPAP     Past Surgical History:        Procedure Laterality Date    ANKLE FRACTURE SURGERY      ANKLE SURGERY Left 10/1/2020    REMOVAL OF HARDWARE LEFT ANKLE performed by Beni Richard MD at [address]  2006    MOHS SURGERY Right 03/11/2020    Right Mid Vertex Scalp    MOHS SURGERY  01/27/2021    R Forehead    TOTAL ANKLE ARTHROPLASTY Left 10/1/2020    LEFT ANKLE REPLACEMENT, GASTROCNEMIUS RECESSION; performed by Beni Richard MD at [facility]         Allergies:  Patient has no known allergies. Current Medications:    Prior to Admission medications    Medication Sig Start Date End Date Taking? Authorizing Provider   zolpidem (AMBIEN CR) 6.25 MG extended release tablet Take 1 tablet by mouth nightly as needed for Sleep for up to 7 days.  2/17/22 2/24/22 Yes Chet Troy MD   zolpidem (AMBIEN CR) 12.5 MG extended release tablet TAKE ONE TABLET BY MOUTH NIGHTLY AS NEEDED FOR SLEEP- KAMARI 2/1/22 5/12/22 Yes Chet Troy MD   tamsulosin (FLOMAX) 0.4 MG capsule TAKE 1 CAPSULE BY MOUTH  DAILY 12/8/21  Yes Chet Troy MD   lisinopril (PRINIVIL;ZESTRIL) 10 MG tablet TAKE 1 TABLET BY MOUTH EVERY MORNING 8/5/21  Yes Historical Provider, MD   metFORMIN (GLUCOPHAGE-XR) 500 MG extended release tablet TAKE 1 TABLET BY MOUTH DAILY WITH BREAKFAST 8/3/21  Yes Chet Troy MD   rosuvastatin (CRESTOR) 20 MG tablet TAKE 1 TABLET BY MOUTH  DAILY 7/8/21  Yes Chet Troy MD   clopidogrel (PLAVIX) 75 MG tablet TAKE 1 TABLET BY MOUTH  DAILY 7/8/21  Yes Chet Troy MD   olmesartan (BENICAR) 20 MG tablet Take 1 tablet by mouth 9/23/20  Yes Historical Provider, MD   fluorouracil (EFUDEX) 5 % cream Apply to the forehead, temples and sides of the cheeks 2 times daily for 14 days. 8/10/20  Yes Karis Dior MD   JUBLIA 10 % SOLN APPLY TO THE RIGHT BIG TONAIL AND 4TH TOENAIL ONCE A DAY AFTER BATHING 8/16/18  Yes Karis Dior MD   Vitamin D (CHOLECALCIFEROL) 1000 UNITS CAPS capsule Take 1,000 Units by mouth daily. Yes Historical Provider, MD   aspirin 81 MG EC tablet Take 81 mg by mouth M,W,F only   Yes Historical Provider, MD   triamcinolone (KENALOG) 0.1 % cream Apply to affected aresa on the right chest and right thigh twice daily for up to 2 weeks or until improved. Patient not taking: Reported on 2/17/2022 11/22/21   Karis Dior MD   omeprazole (PRILOSEC) 20 MG delayed release capsule TAKE 1 CAPSULE BY MOUTH  DAILY  Patient not taking: Reported on 2/17/2022 8/18/20   Chet Troy MD           Physical Exam:      Constitutional:  Well developed, well nourished, no acute distress, non-toxic appearance   Eyes:  PERRL, conjunctiva normal   HENT:  Atraumatic, external ears normal, nose normal, oropharynx moist, no pharyngeal exudates.  Neck- normal range of motion, no tenderness, supple Respiratory:  No respiratory distress, normal breath sounds, no rales, no wheezing   Cardiovascular:  Normal rate, normal rhythm, no murmurs, no gallops, no rubs   GI:  Soft, nondistended, normal bowel sounds, nontender, no organomegaly, no mass, no rebound, no guarding   :  No costovertebral angle tenderness   Musculoskeletal:  No edema, no tenderness, no deformities. Back- no tenderness  Integument:  Well hydrated, no rash   Lymphatic:  No lymphadenopathy noted   Neurologic:  Alert & oriented x 3, normal motor function, normal sensory function, no focal deficits noted   Psychiatric:  Speech and behavior appropriate     Vitals: BP (!) 142/68 (Site: Left Upper Arm)   Pulse 83   Wt 193 lb (87.5 kg)   SpO2 97%   BMI 29.35 kg/m²     Body mass index is 29.35 kg/m².   Wt Readings from Last 3 Encounters:   02/17/22 193 lb (87.5 kg)   02/01/22 183 lb (83 kg)   11/01/21 185 lb (83.9 kg)         LABS:    CBC:   Lab Results   Component Value Date    WBC 6.1 02/09/2022    HGB 13.5 02/09/2022    HCT 40.0 (L) 02/09/2022    MCV 91.6 02/09/2022     02/09/2022         No results found for: IRON, TIBC, FERRITIN, FOLATE, HYFVVGYD50, PTH                                                          BMP:    Lab Results   Component Value Date     02/09/2022    K 4.4 02/09/2022     02/09/2022    CO2 22 02/09/2022       LFT's:   Lab Results   Component Value Date    ALT 14 02/09/2022    AST 17 02/09/2022    GGT 21 12/05/2011    ALKPHOS 56 02/09/2022    BILITOT 0.5 02/09/2022       Lipids:   Lab Results   Component Value Date    CHOL 143 02/09/2022    HDL 59 02/09/2022    LDLCALC 66 02/09/2022    TRIG 91 02/09/2022       INR: No results found for: INR, PROTIME    U/A:  Lab Results   Component Value Date    LABMICR Not Indicated 09/10/2020          Lab Results   Component Value Date    LABA1C 6.6 02/09/2022        Lab Results   Component Value Date    CREATININE 1.1 02/09/2022 -----------------------------------------------------------------    Assessment/Plan:   1. Primary hypertension  Was elevated in the clinic. Patient reports that it is unusual for him to come to the clinic with his daughter and hence he has some stress associated with it. Patient will check his blood pressure at home and will let us know    2. Memory change  He scored 29 out of 30 on his Mini-Mental status examination. Does not seem like patient is having any difficulty forming new short term memory, is not getting lost in familiar places does not have a resting tremor does not have any shuffling gait. Decrease in memory most likely associated with his natural aging process    3. Difficulty concentrating  From history sounds like patient has been having difficulty concentrating may have undiagnosed ADD. However cannot treat him given his age. He has been on Ambien for around 20 years we will try to wean him off Ambien. Prescription was given to take half his normal dose of Ambien for the next 7 days and stop. Patient will let us know if he has difficulty sleeping thereafter    4. Primary insomnia  Given he has been having some difficulty concentrating and per family has been having issues with memory we will try to wean him off of the Ambien. We will decrease the dose to 6.25 daily for the next week and after that he will stop. Patient will let us know if he has difficulty with falling asleep    5. Benign prostatic hyperplasia with incomplete bladder emptying  On tamsulosin 0.4 mg. Continue current dose, will monitor for worsening of sx.

## 2022-02-22 ENCOUNTER — OFFICE VISIT (OUTPATIENT)
Dept: DERMATOLOGY | Age: 77
End: 2022-02-22
Payer: MEDICARE

## 2022-02-22 VITALS — TEMPERATURE: 98.1 F

## 2022-02-22 DIAGNOSIS — L57.0 AK (ACTINIC KERATOSIS): Primary | ICD-10-CM

## 2022-02-22 DIAGNOSIS — L85.3 XEROSIS CUTIS: ICD-10-CM

## 2022-02-22 PROCEDURE — 1123F ACP DISCUSS/DSCN MKR DOCD: CPT | Performed by: DERMATOLOGY

## 2022-02-22 PROCEDURE — 17003 DESTRUCT PREMALG LES 2-14: CPT | Performed by: DERMATOLOGY

## 2022-02-22 PROCEDURE — 3017F COLORECTAL CA SCREEN DOC REV: CPT | Performed by: DERMATOLOGY

## 2022-02-22 PROCEDURE — 17000 DESTRUCT PREMALG LESION: CPT | Performed by: DERMATOLOGY

## 2022-02-22 PROCEDURE — G8417 CALC BMI ABV UP PARAM F/U: HCPCS | Performed by: DERMATOLOGY

## 2022-02-22 PROCEDURE — 99212 OFFICE O/P EST SF 10 MIN: CPT | Performed by: DERMATOLOGY

## 2022-02-22 PROCEDURE — G8427 DOCREV CUR MEDS BY ELIG CLIN: HCPCS | Performed by: DERMATOLOGY

## 2022-02-22 PROCEDURE — G8484 FLU IMMUNIZE NO ADMIN: HCPCS | Performed by: DERMATOLOGY

## 2022-02-22 PROCEDURE — 1036F TOBACCO NON-USER: CPT | Performed by: DERMATOLOGY

## 2022-02-22 PROCEDURE — 4040F PNEUMOC VAC/ADMIN/RCVD: CPT | Performed by: DERMATOLOGY

## 2022-02-22 NOTE — PROGRESS NOTES
[facility] Dermatology  Rubina Juarez MD  [phone]      Lois Cowan  1945    78 y.o. male     Date of Visit: 2/22/2022    Chief Complaint: skin lesions    History of Present Illness:    1. He presents today for persistent scaly lesions on the forehead and frontal scalp. Treated with Efudex in the past.     2.  He also complains of dry itchy skin on the extremities. Dermatologic history:     Superficial BCC on the right lower shin - treated with curettage on 1/21/22. Superficial basal cell carcinoma of the left upper chest-treated with curettage on 3/5/2021. Nodular BCC left anterior shoulder-treated with electrodesiccation and curettage on 3/5/2021. Metatypical BCC on the right forehead-treated with Mohs by Dr. Jessenia Gray on 1/27/2021.     Small nodular BCC on the right superior shoulder-treated with curettage at the time of biopsy on 11/17/2020. Nodular BCC of the right mid vertex scalp-treated with Mohs by Dr. Jessenia Gray on 3/11/2020.     Small nodular BCCs on the right anterior upper arm and right mid arm-treated with curettage at the time of biopsy on 2/26/2019.     Nodular BCC on the left upper chest history with curettage at the time of biopsy in October 2018. Superficial BCC of the right supraclavicular region-treated with curettage at time of biopsy on 1/3/2018.     Nodular BCC on the right thigh - excised on 6/23/17.    Superficial BCC on the right central upper back - treated with curettage on 3/28/17. Nodular BCC on the left central lower back - treated with curettage on 3/28/17.     Nodular BCC on the right mid thigh-treated with curettage on 10/7/2016. Superficial basal cell carcinoma left upper chest-treated with curettage on 9/21/2016. Nodular BCC of the right mid central back-treated with curettage on 9/21/2016.   Superficial BCC of the left central lower back-treated with curettage on 3/3/2016.     4 BCCs treated with ED&C on 9/2015.    Nodular BCC on the right lateral shoulder, superior  Nodular basal cell carcinoma on the right lateral shoulder, inferior  Superficial basal cell carcinoma of the right upper back  Superficial basal cell carcinoma of the central lower back      Bowen's disease of the left temple-treated with Efudex cream b.i.d. for 4 weeks.    Superficial BCC, right posterior lateral arm-treated with curettage 3/7/14. BCC, right upper arm-excised 3/7/2014. BCC on the nasal dorsum (biopsy 10/2013) status post Mohs micrographic surgery in December 2013. Review of Systems:  Gen: Feels well, good sense of health. Skin: No new or changing moles. Past Medical History, Family History, Surgical History, Medications and Allergies reviewed. Past Medical History:   Diagnosis Date    Anxiety     BPH (benign prostatic hyperplasia) 5/31/2012    CAD (coronary artery disease)     Cancer (HCC)     SKIN    Hyperlipidemia     LBBB (left bundle branch block) 5/31/2012    Primary insomnia 10/31/2019    Sleep apnea 2011    USE CPAP     Past Surgical History:   Procedure Laterality Date    ANKLE FRACTURE SURGERY      ANKLE SURGERY Left 10/1/2020    REMOVAL OF HARDWARE LEFT ANKLE performed by Joselito Ontiveros MD at [address] [place] [address]  2006    MOHS SURGERY Right 03/11/2020    Right Mid Vertex Scalp    MOHS SURGERY  01/27/2021    R Forehead    TOTAL ANKLE ARTHROPLASTY Left 10/1/2020    LEFT ANKLE REPLACEMENT, GASTROCNEMIUS RECESSION; performed by Joselito Ontiveros MD at [address] [place] [address] [address]       No Known Allergies  Outpatient Medications Marked as Taking for the 2/22/22 encounter (Office Visit) with John López MD   Medication Sig Dispense Refill    zolpidem (AMBIEN CR) 6.25 MG extended release tablet Take 1 tablet by mouth nightly as needed for Sleep for up to 7 days.  7 tablet 0    zolpidem (AMBIEN CR) 12.5 MG extended release tablet TAKE ONE TABLET BY MOUTH NIGHTLY AS NEEDED FOR SLEEP- KAMARI 90 tablet 3    tamsulosin (FLOMAX) 0.4 MG capsule TAKE 1 CAPSULE BY MOUTH  DAILY 90 capsule 3    triamcinolone (KENALOG) 0.1 % cream Apply to affected aresa on the right chest and right thigh twice daily for up to 2 weeks or until improved. 30 g 2    lisinopril (PRINIVIL;ZESTRIL) 10 MG tablet TAKE 1 TABLET BY MOUTH EVERY MORNING      metFORMIN (GLUCOPHAGE-XR) 500 MG extended release tablet TAKE 1 TABLET BY MOUTH DAILY WITH BREAKFAST 90 tablet 3    rosuvastatin (CRESTOR) 20 MG tablet TAKE 1 TABLET BY MOUTH  DAILY 90 tablet 3    clopidogrel (PLAVIX) 75 MG tablet TAKE 1 TABLET BY MOUTH  DAILY 90 tablet 3    olmesartan (BENICAR) 20 MG tablet Take 1 tablet by mouth      omeprazole (PRILOSEC) 20 MG delayed release capsule TAKE 1 CAPSULE BY MOUTH  DAILY 90 capsule 3    fluorouracil (EFUDEX) 5 % cream Apply to the forehead, temples and sides of the cheeks 2 times daily for 14 days. 40 g 0    JUBLIA 10 % SOLN APPLY TO THE RIGHT BIG TONAIL AND 4TH TOENAIL ONCE A DAY AFTER BATHING 4 mL 0    Vitamin D (CHOLECALCIFEROL) 1000 UNITS CAPS capsule Take 1,000 Units by mouth daily.  aspirin 81 MG EC tablet Take 81 mg by mouth M,W,F only           Physical Examination       The following were examined and determined to be normal: Psych/Neuro, Conjunctivae/eyelids, Gums/teeth/lips, Neck, Breast/axilla/chest, Abdomen and Back. The following were examined and determined to be abnormal: Scalp/hair, Head/face, RUE, LUE, RLE and LLE. Well appearing. 1. Right frontal scalp - 3, right forehead - 1, left frontal scalp - 4, left temple - 1: ill defined keratotic pink macules. 2.  Extremities with diffuse scaling of the skin. Assessment and Plan     1. AK (actinic keratosis) - multiple    2 cycles of liquid nitrogen applied to 9 AKs: Right frontal scalp - 3, right forehead - 1, left frontal scalp - 4, left temple - 1. Patient was educated regarding the potential risks of blister formation and discomfort. Wound care was discussed.       2. Xerosis

## 2022-02-22 NOTE — PATIENT INSTRUCTIONS
DRY SKIN CARE    1. Do not take more than 1 shower or bath each day. Try to spend 10 minutes or less in the shower/bath. Avoid hot showers as this can damage the skin and make the dryness worse. 2. Use a moisturizing or mild soap such Dove (for sensitive skin), Cetaphil (Cleansing Bar,Gentle Body Wash, Restoraderm Soothing Wash), CeraVe Hydrating Body Wash, Eucerin Skin Calming Body Wash, or Aveeno Daily Moisturizing Body Wash. Antibacterial soaps can be too drying. 3. Use soap only on limited areas (face, underarms, groin and buttocks). Try to avoid using soap on the arms, legs, trunk and back. 4. After showering, pat dry with a towel and generously apply a thick moisturizer all over. Use a moisturizer every day even if you are not showering that particular day. 5. If you are able, apply the moisturizer a second time during the day as well. 6. If a prescription cream or ointment has been ordered for you, apply the prescription medication to affected areas after your bath/shower while the skin is still damp, then apply the moisturizer to the remainder of the skin. 7. When it comes to moisturizers, the thicker the better. Ointments (such as vaseline) are thicker than creams, and creams are thicker than lotions. Suggested over-the-counter moisturizers include:    · CeraVe Lotion or Cream  · Cetaphil Lotion or Cream  · Eucerin Advanced Repair Cream, Advanced Repair Lotion, Eczema Relief Cream or Intensive Repair Lotion.    · Lubriderm Lotion  · Vaseline (petroleum jelly)  · Aquaphor  · Nabila moisturizing lotion or cream  · Neutrogena Hand Cream  · Aveeno Moisturizing Cream or Lotion  · Curel Ultra Healing   · Vanicream Moisturizing Skin Cream  ·

## 2022-03-14 DIAGNOSIS — F51.01 PRIMARY INSOMNIA: ICD-10-CM

## 2022-03-15 RX ORDER — LORAZEPAM 0.5 MG/1
TABLET ORAL
Qty: 270 TABLET | Refills: 0 | Status: SHIPPED | OUTPATIENT
Start: 2022-03-15 | End: 2022-05-02 | Stop reason: SDUPTHER

## 2022-04-19 ENCOUNTER — TELEPHONE (OUTPATIENT)
Dept: INTERNAL MEDICINE CLINIC | Age: 77
End: 2022-04-19

## 2022-04-19 DIAGNOSIS — E11.9 TYPE 2 DIABETES MELLITUS TREATED WITHOUT INSULIN (HCC): ICD-10-CM

## 2022-04-19 RX ORDER — METFORMIN HYDROCHLORIDE 500 MG/1
TABLET, EXTENDED RELEASE ORAL
Qty: 90 TABLET | Refills: 3 | Status: SHIPPED | OUTPATIENT
Start: 2022-04-19

## 2022-05-02 ENCOUNTER — OFFICE VISIT (OUTPATIENT)
Dept: INTERNAL MEDICINE CLINIC | Age: 77
End: 2022-05-02
Payer: MEDICARE

## 2022-05-02 VITALS
HEART RATE: 76 BPM | WEIGHT: 191 LBS | SYSTOLIC BLOOD PRESSURE: 132 MMHG | OXYGEN SATURATION: 98 % | HEIGHT: 68 IN | BODY MASS INDEX: 28.95 KG/M2 | DIASTOLIC BLOOD PRESSURE: 78 MMHG

## 2022-05-02 DIAGNOSIS — E11.9 TYPE 2 DIABETES MELLITUS TREATED WITHOUT INSULIN (HCC): Primary | ICD-10-CM

## 2022-05-02 DIAGNOSIS — G47.33 OBSTRUCTIVE SLEEP APNEA SYNDROME: ICD-10-CM

## 2022-05-02 DIAGNOSIS — F51.01 PRIMARY INSOMNIA: ICD-10-CM

## 2022-05-02 LAB
CHP ED QC CHECK: NORMAL
GLUCOSE BLD-MCNC: 132 MG/DL

## 2022-05-02 PROCEDURE — 1123F ACP DISCUSS/DSCN MKR DOCD: CPT | Performed by: INTERNAL MEDICINE

## 2022-05-02 PROCEDURE — 3044F HG A1C LEVEL LT 7.0%: CPT | Performed by: INTERNAL MEDICINE

## 2022-05-02 PROCEDURE — 4040F PNEUMOC VAC/ADMIN/RCVD: CPT | Performed by: INTERNAL MEDICINE

## 2022-05-02 PROCEDURE — 3017F COLORECTAL CA SCREEN DOC REV: CPT | Performed by: INTERNAL MEDICINE

## 2022-05-02 PROCEDURE — 1036F TOBACCO NON-USER: CPT | Performed by: INTERNAL MEDICINE

## 2022-05-02 PROCEDURE — G8427 DOCREV CUR MEDS BY ELIG CLIN: HCPCS | Performed by: INTERNAL MEDICINE

## 2022-05-02 PROCEDURE — G8417 CALC BMI ABV UP PARAM F/U: HCPCS | Performed by: INTERNAL MEDICINE

## 2022-05-02 PROCEDURE — 82962 GLUCOSE BLOOD TEST: CPT | Performed by: INTERNAL MEDICINE

## 2022-05-02 PROCEDURE — 2022F DILAT RTA XM EVC RTNOPTHY: CPT | Performed by: INTERNAL MEDICINE

## 2022-05-02 PROCEDURE — 99213 OFFICE O/P EST LOW 20 MIN: CPT | Performed by: INTERNAL MEDICINE

## 2022-05-02 RX ORDER — LORAZEPAM 0.5 MG/1
TABLET ORAL
Qty: 270 TABLET | Refills: 0 | Status: SHIPPED | OUTPATIENT
Start: 2022-05-02 | End: 2022-06-15 | Stop reason: SDUPTHER

## 2022-05-02 ASSESSMENT — PATIENT HEALTH QUESTIONNAIRE - PHQ9
1. LITTLE INTEREST OR PLEASURE IN DOING THINGS: 0
SUM OF ALL RESPONSES TO PHQ QUESTIONS 1-9: 0
SUM OF ALL RESPONSES TO PHQ QUESTIONS 1-9: 0
2. FEELING DOWN, DEPRESSED OR HOPELESS: 0
SUM OF ALL RESPONSES TO PHQ QUESTIONS 1-9: 0
SUM OF ALL RESPONSES TO PHQ QUESTIONS 1-9: 0
SUM OF ALL RESPONSES TO PHQ9 QUESTIONS 1 & 2: 0

## 2022-05-02 NOTE — PROGRESS NOTES
CHIEF COMPLAINT: Kingston Villalpando is a 78 y.o. male who presents for follow-up diabetes anxiety    HPI: Patient presented with follow-up of the above his blood sugars been well controlled he does not check it at home denies any polyuria polydipsia polyphagia is otherwise been feeling fine and takes the Ativan as needed    Review of Systems:   Constitutional:  Denies fever or chills   Eyes:  Denies change in visual acuity   HENT:  Denies nasal congestion or sore throat   Respiratory:  Denies cough or shortness of breath   Cardiovascular:  Denies chest pain or edema   GI:  Denies abdominal pain, nausea, vomiting, bloody stools or diarrhea   :  Denies dysuria   Musculoskeletal:  Denies back pain or joint pain   Integument:  Denies rash   Neurologic:  Denies headache, focal weakness or sensory changes   Endocrine:  Denies polyuria or polydipsia   Lymphatic:  Denies swollen glands   Psychiatric:  Denies depression or anxiety     Past Medical History:        Diagnosis Date    Anxiety     BPH (benign prostatic hyperplasia) 5/31/2012    CAD (coronary artery disease)     [facility] ([facility]     SKIN    Hyperlipidemia     LBBB (left bundle branch block) 5/31/2012    Primary insomnia 10/31/2019    Sleep apnea 2011    USE CPAP       Past Surgical History:        Procedure Laterality Date    ANKLE FRACTURE SURGERY      ANKLE SURGERY Left 10/1/2020    REMOVAL OF HARDWARE LEFT ANKLE performed by Shira Napoles MD at [facility] Right 03/11/2020    Right Mid Vertex Scalp    MOHS SURGERY  01/27/2021    R Forehead    TOTAL ANKLE ARTHROPLASTY Left 10/1/2020    LEFT ANKLE REPLACEMENT, GASTROCNEMIUS RECESSION; performed by Shira Napoles MD at [facility] History:  Family History   Problem Relation Age of Onset    Cancer Mother     Heart Disease Father     Thyroid Disease Brother        Social History:  Social History     Socioeconomic History    Marital status:      Spouse name: Not on file    Number of children: Not on file    Years of education: Not on file    Highest education level: Not on file   Occupational History    Not on file   Tobacco Use    Smoking status: Never Smoker    Smokeless tobacco: Never Used   Vaping Use    Vaping Use: Never used   Substance and Sexual Activity    Alcohol use: Yes     Alcohol/week: 1.7 standard drinks     Types: 2 Standard drinks or equivalent per week     Comment: OCC    Drug use: Never    Sexual activity: Not on file   Other Topics Concern    Not on file   Social History Narrative    Not on file     Social Determinants of Health     Financial Resource Strain: Low Risk     Difficulty of Paying Living Expenses: Not hard at all   Food Insecurity: No Food Insecurity    Worried About [address] [employer] in the Last Year: Never true    [address] [employer] in the Last Year: Never true   Transportation Needs:     Lack of Transportation (Medical): Not on file    Lack of Transportation (Non-Medical):  Not on file   Physical Activity:     Days of Exercise per Week: Not on file    Minutes of Exercise per Session: Not on file   Stress:     Feeling of Stress : Not on file   Social Connections:     Frequency of Communication with Friends and Family: Not on file    Frequency of Social Gatherings with Friends and Family: Not on file    Attends [employer] Services: Not on file    Active Member of [address] [place] [address] or Organizations: Not on file    Attends Club or Organization Meetings: Not on file    Marital Status: Not on file   Intimate Partner Violence:     Fear of Current or Ex-Partner: Not on file    Emotionally Abused: Not on file    Physically Abused: Not on file    Sexually Abused: Not on file   Housing Stability:     Unable to Pay for Housing in the Last Year: Not on file    Number of [place] in the Last Year: Not on file    Unstable Housing in the Last Year: Not on file         Allergies:  Patient has no known allergies. Current Medications:    Prior to Admission medications    Medication Sig Start Date End Date Taking? Authorizing Provider   LORazepam (ATIVAN) 0.5 MG tablet TAKE 1 TABLET BY MOUTH EVERY 8 HOURS AS NEEDED FOR ANXIETY 5/2/22 8/2/22 Yes Surjit Steele MD   metFORMIN (GLUCOPHAGE-XR) 500 MG extended release tablet TAKE 1 TABLET BY MOUTH DAILY WITH BREAKFAST 4/19/22  Yes Surjit Steele MD   tamsulosin (FLOMAX) 0.4 MG capsule TAKE 1 CAPSULE BY MOUTH  DAILY 12/8/21  Yes Surjit Steele MD   triamcinolone (KENALOG) 0.1 % cream Apply to affected aresa on the right chest and right thigh twice daily for up to 2 weeks or until improved. 11/22/21  Yes Kathya Brock MD   lisinopril (PRINIVIL;ZESTRIL) 10 MG tablet TAKE 1 TABLET BY MOUTH EVERY MORNING 8/5/21  Yes Historical Provider, MD   rosuvastatin (CRESTOR) 20 MG tablet TAKE 1 TABLET BY MOUTH  DAILY 7/8/21  Yes Surjit Steele MD   clopidogrel (PLAVIX) 75 MG tablet TAKE 1 TABLET BY MOUTH  DAILY 7/8/21  Yes Surjit Steele MD   olmesartan (BENICAR) 20 MG tablet Take 1 tablet by mouth 9/23/20  Yes Historical Provider, MD   omeprazole (PRILOSEC) 20 MG delayed release capsule TAKE 1 CAPSULE BY MOUTH  DAILY 8/18/20  Yes Surjit Steele MD   fluorouracil (EFUDEX) 5 % cream Apply to the forehead, temples and sides of the cheeks 2 times daily for 14 days. 8/10/20  Yes Kathya Brock MD   JUBLIA 10 % SOLN APPLY TO THE RIGHT BIG TONAIL AND 4TH TOENAIL ONCE A DAY AFTER BATHING 8/16/18  Yes Kathya Brock MD   Vitamin D (CHOLECALCIFEROL) 1000 UNITS CAPS capsule Take 1,000 Units by mouth daily.    Yes Historical Provider, MD       Physical Exam:  Vital Signs: /78   Pulse 76   Ht 5' 8\" (1.727 m)   Wt 191 lb (86.6 kg)   SpO2 98%   BMI 29.04 kg/m²   General: Patient appears  non-toxic  HENT: Atraumatic, normocephalic, oral mucosa moist  Lungs:  Clear bilaterally  Heart: Regular rate and rhythm  Abdomen: Non-distended, soft, non-tender  Extremities: No edema  Neuro: Nonfocal    Medical Decision Making and Plan:  Pertinent Labs & Imaging studies reviewed. (See chart for details)  Accu-Chek is pending    1. Type 2 diabetes mellitus [facility]  This problem is stable continue present med  - POCT Glucose    2. Obstructive sleep apnea syndrome  Problem stable continue CPAP    3. Primary insomnia  Refill Ativan no evidence of addiction or dependency we will continue and follow-up in 3 months  - LORazepam (ATIVAN) 0.5 MG tablet; TAKE 1 TABLET BY MOUTH EVERY 8 HOURS AS NEEDED FOR ANXIETY  Dispense: 270 tablet;  Refill: 0

## 2022-06-09 DIAGNOSIS — E78.5 HYPERLIPIDEMIA, UNSPECIFIED HYPERLIPIDEMIA TYPE: ICD-10-CM

## 2022-06-09 DIAGNOSIS — I25.10 CAD (CORONARY ARTERY DISEASE): ICD-10-CM

## 2022-06-09 DIAGNOSIS — I45.10 RBBB (RIGHT BUNDLE BRANCH BLOCK): ICD-10-CM

## 2022-06-09 DIAGNOSIS — R55 SYNCOPE, NEAR: ICD-10-CM

## 2022-06-09 DIAGNOSIS — E78.5 HYPERLIPEMIA: ICD-10-CM

## 2022-06-09 DIAGNOSIS — Z95.5 S/P CORONARY ARTERY STENT PLACEMENT: ICD-10-CM

## 2022-06-10 RX ORDER — CLOPIDOGREL BISULFATE 75 MG/1
75 TABLET ORAL DAILY
Qty: 90 TABLET | Refills: 3 | Status: SHIPPED | OUTPATIENT
Start: 2022-06-10

## 2022-06-10 RX ORDER — ROSUVASTATIN CALCIUM 20 MG/1
20 TABLET, COATED ORAL DAILY
Qty: 90 TABLET | Refills: 3 | Status: SHIPPED | OUTPATIENT
Start: 2022-06-10

## 2022-06-15 DIAGNOSIS — F51.01 PRIMARY INSOMNIA: ICD-10-CM

## 2022-06-15 RX ORDER — LORAZEPAM 0.5 MG/1
TABLET ORAL
Qty: 270 TABLET | Refills: 0 | Status: SHIPPED | OUTPATIENT
Start: 2022-06-15 | End: 2022-08-09 | Stop reason: SDUPTHER

## 2022-06-15 NOTE — TELEPHONE ENCOUNTER
Pt called and stated that he is already out of the following medication and needs a refill and pt is confused about how he is already out of the medication. And if the following medication can be refilled can it be sent to the following pharmacy listed below.       LORazepam (ATIVAN) 0.5 MG tablet       [address], [address] [place] [address], [phone]           Please call and advise

## 2022-06-17 NOTE — PROGRESS NOTES
order in effect during my hospitalization, the order may or may not be in effect during this procedure. I give my doctor permission to give me blood or blood products. I understand that there are risks with receiving blood such as hepatitis, AIDS, fever, or allergic reaction. I acknowledge that the risks, benefits, and alternatives of this treatment have been explained to me and that no express or implied warranty has been given by the hospital, any blood bank, or any person or entity as to the blood or blood components transfused. At the discretion of my doctor, I agree to allow observers, equipment/product representatives and allow photographing, and/or televising of the procedure, provided my name or identity is maintained confidentially. I agree the hospital may dispose of or use for scientific or educational purposes any tissue, fluid, or body parts which may be removed.     ________________________________Date________Time______ am/pm  ([place] [facility])  Patient or Signature of Closest Relative or Legal Guardian    ________________________________Date________Time______am/pm      Page 1 of  1  Witness None known

## 2022-08-09 ENCOUNTER — OFFICE VISIT (OUTPATIENT)
Dept: INTERNAL MEDICINE CLINIC | Age: 77
End: 2022-08-09
Payer: MEDICARE

## 2022-08-09 VITALS
BODY MASS INDEX: 29.7 KG/M2 | HEIGHT: 68 IN | WEIGHT: 196 LBS | SYSTOLIC BLOOD PRESSURE: 150 MMHG | DIASTOLIC BLOOD PRESSURE: 80 MMHG

## 2022-08-09 DIAGNOSIS — G47.33 OBSTRUCTIVE SLEEP APNEA SYNDROME: ICD-10-CM

## 2022-08-09 DIAGNOSIS — E11.9 TYPE 2 DIABETES MELLITUS TREATED WITHOUT INSULIN (HCC): Primary | ICD-10-CM

## 2022-08-09 DIAGNOSIS — I25.119 CORONARY ARTERY DISEASE INVOLVING NATIVE CORONARY ARTERY WITH ANGINA PECTORIS, UNSPECIFIED WHETHER NATIVE OR TRANSPLANTED HEART (HCC): ICD-10-CM

## 2022-08-09 DIAGNOSIS — F51.01 PRIMARY INSOMNIA: ICD-10-CM

## 2022-08-09 PROCEDURE — 3044F HG A1C LEVEL LT 7.0%: CPT | Performed by: INTERNAL MEDICINE

## 2022-08-09 PROCEDURE — 1123F ACP DISCUSS/DSCN MKR DOCD: CPT | Performed by: INTERNAL MEDICINE

## 2022-08-09 PROCEDURE — 99213 OFFICE O/P EST LOW 20 MIN: CPT | Performed by: INTERNAL MEDICINE

## 2022-08-09 RX ORDER — LORAZEPAM 0.5 MG/1
TABLET ORAL
Qty: 270 TABLET | Refills: 0 | Status: SHIPPED | OUTPATIENT
Start: 2022-08-09 | End: 2022-11-09

## 2022-08-09 RX ORDER — OMEPRAZOLE 40 MG/1
40 CAPSULE, DELAYED RELEASE ORAL
Qty: 90 CAPSULE | Refills: 1 | Status: SHIPPED | OUTPATIENT
Start: 2022-08-09

## 2022-08-09 SDOH — ECONOMIC STABILITY: FOOD INSECURITY: WITHIN THE PAST 12 MONTHS, YOU WORRIED THAT YOUR FOOD WOULD RUN OUT BEFORE YOU GOT MONEY TO BUY MORE.: NEVER TRUE

## 2022-08-09 SDOH — ECONOMIC STABILITY: FOOD INSECURITY: WITHIN THE PAST 12 MONTHS, THE FOOD YOU BOUGHT JUST DIDN'T LAST AND YOU DIDN'T HAVE MONEY TO GET MORE.: NEVER TRUE

## 2022-08-09 ASSESSMENT — SOCIAL DETERMINANTS OF HEALTH (SDOH): HOW HARD IS IT FOR YOU TO PAY FOR THE VERY BASICS LIKE FOOD, HOUSING, MEDICAL CARE, AND HEATING?: NOT HARD AT ALL

## 2022-08-09 NOTE — PROGRESS NOTES
CHIEF COMPLAINT: Billy Fraser is a 78 y.o. male who presents for : Symptoms follow-up anxiety    HPI: Patient presented with follow-up of the above he notes that he is having breakthrough with his 20 mg of Prilosec been under some stress as a result of his sister dying    Review of Systems:   Constitutional:  Denies fever or chills   Eyes:  Denies change in visual acuity   HENT:  Denies nasal congestion or sore throat   Respiratory:  Denies cough or shortness of breath   Cardiovascular:  Denies chest pain or edema   GI:  Denies abdominal pain, nausea, vomiting, bloody stools or diarrhea   :  Denies dysuria   Musculoskeletal:  Denies back pain or joint pain   Integument:  Denies rash   Neurologic:  Denies headache, focal weakness or sensory changes   Endocrine:  Denies polyuria or polydipsia   Lymphatic:  Denies swollen glands   Psychiatric:  Denies depression or anxiety     Past Medical History:        Diagnosis Date    Anxiety     BPH (benign prostatic hyperplasia) 5/31/2012    CAD (coronary artery disease)     Cancer ([facility] [address]     SKIN    Hyperlipidemia     LBBB (left bundle branch block) 5/31/2012    Primary insomnia 10/31/2019    Sleep apnea 2011    USE CPAP       Past Surgical History:        Procedure Laterality Date    ANKLE FRACTURE SURGERY      ANKLE SURGERY Left 10/1/2020    REMOVAL OF HARDWARE LEFT ANKLE performed by Kirti Puente MD at [place] [address]    MOHS SURGERY Right 03/11/2020    Right Mid Vertex Scalp    MOHS SURGERY  01/27/2021    R Forehead    TOTAL ANKLE ARTHROPLASTY Left 10/1/2020    LEFT ANKLE REPLACEMENT, GASTROCNEMIUS RECESSION; performed by Kirti Puente MD at [facility] [address] OR       Family History:  Family History   Problem Relation Age of Onset    Cancer Mother     Heart Disease Father     Thyroid Disease Brother        Social History:  Social History     Socioeconomic History    Marital status:      Spouse name: None    Number of children: None    Years of education: None    Highest education level: None   Tobacco Use    Smoking status: Never    Smokeless tobacco: Never   Vaping Use    Vaping Use: Never used   Substance and Sexual Activity    Alcohol use: Yes     Alcohol/week: 1.7 standard drinks     Types: 2 Standard drinks or equivalent per week     Comment: OCC    Drug use: Never     Social Determinants of Health     Financial Resource Strain: Low Risk     Difficulty of Paying Living Expenses: Not hard at all   Food Insecurity: No Food Insecurity    Worried About Running Out of Food in the Last Year: Never true    Ran Out of Food in the Last Year: Never true         Allergies:  Patient has no known allergies. Current Medications:    Prior to Admission medications    Medication Sig Start Date End Date Taking? Authorizing Provider   LORazepam (ATIVAN) 0.5 MG tablet TAKE 1 TABLET BY MOUTH EVERY 8 HOURS AS NEEDED FOR ANXIETY 8/9/22 11/9/22 Yes Semaj Villarreal MD   omeprazole (PRILOSEC) 40 MG delayed release capsule Take 1 capsule by mouth every morning (before breakfast) 8/9/22  Yes Semaj Villarreal MD   clopidogrel (PLAVIX) 75 MG tablet TAKE 1 TABLET BY MOUTH  DAILY 6/10/22  Yes Semaj Villarreal MD   rosuvastatin (CRESTOR) 20 MG tablet TAKE 1 TABLET BY MOUTH  DAILY 6/10/22  Yes Semaj Villarreal MD   metFORMIN (GLUCOPHAGE-XR) 500 MG extended release tablet TAKE 1 TABLET BY MOUTH DAILY WITH BREAKFAST 4/19/22  Yes Semaj Villarreal MD   tamsulosin (FLOMAX) 0.4 MG capsule TAKE 1 CAPSULE BY MOUTH  DAILY 12/8/21  Yes Semaj Villarreal MD   triamcinolone (KENALOG) 0.1 % cream Apply to affected aresa on the right chest and right thigh twice daily for up to 2 weeks or until improved.  11/22/21  Yes Landon Harp MD   lisinopril (PRINIVIL;ZESTRIL) 10 MG tablet TAKE 1 TABLET BY MOUTH EVERY MORNING 8/5/21  Yes Historical Provider, MD   olmesartan (BENICAR) 20 MG tablet Take 1 tablet by mouth 9/23/20  Yes Historical Provider, MD   fluorouracil (EFUDEX) 5 % cream Apply to the forehead, temples and sides of the cheeks 2 times daily for 14 days. 8/10/20  Yes Sam Crow MD   JUBLIA 10 % SOLN APPLY TO THE RIGHT BIG TONAIL AND 4TH TOENAIL ONCE A DAY AFTER BATHING 8/16/18  Yes Sam Crow MD   Vitamin D (CHOLECALCIFEROL) 1000 UNITS CAPS capsule Take 1,000 Units by mouth daily. Yes Historical Provider, MD       Physical Exam:  Vital Signs: BP (!) 150/80   Ht 5' 8\" (1.727 m)   Wt 196 lb (88.9 kg)   BMI 29.80 kg/m²   General: Patient appears  non-toxic  HENT: Atraumatic, normocephalic, oral mucosa moist  Lungs:  Clear bilaterally  Heart: Regular rate and rhythm  Abdomen: Non-distended, soft, non-tender  Extremities: No edema  Neuro: Nonfocal    Medical Decision Making and Plan:  Pertinent Labs & Imaging studies reviewed. (See chart for details)      1. Type 2 diabetes mellitus treated without insulin ([facility] [address]  Problem is stable continue present    2. Obstructive sleep apnea syndrome  Problem is stable continue present meds    3. Coronary artery disease involving native coronary artery with angina pectoris, unspecified whether native or transplanted heart ([address]  Problem is stable continue present meds    4. Primary insomnia  Refill medications no evidence of addiction or dependency  - LORazepam (ATIVAN) 0.5 MG tablet; TAKE 1 TABLET BY MOUTH EVERY 8 HOURS AS NEEDED FOR ANXIETY  Dispense: 270 tablet;  Refill: 0  GERD we will increase Prilosec to 40 and follow-up in 3 months if this does not work you may need an esophagogastroduodenoscopy

## 2022-08-23 ENCOUNTER — OFFICE VISIT (OUTPATIENT)
Dept: DERMATOLOGY | Age: 77
End: 2022-08-23
Payer: MEDICARE

## 2022-08-23 DIAGNOSIS — D48.5 NEOPLASM OF UNCERTAIN BEHAVIOR OF SKIN: ICD-10-CM

## 2022-08-23 DIAGNOSIS — L57.0 AK (ACTINIC KERATOSIS): Primary | ICD-10-CM

## 2022-08-23 PROCEDURE — 17004 DESTROY PREMAL LESIONS 15/>: CPT | Performed by: DERMATOLOGY

## 2022-08-23 PROCEDURE — 11102 TANGNTL BX SKIN SINGLE LES: CPT | Performed by: DERMATOLOGY

## 2022-08-23 RX ORDER — FLUOROURACIL 50 MG/G
CREAM TOPICAL
Qty: 40 G | Refills: 0 | Status: SHIPPED | OUTPATIENT
Start: 2022-08-23

## 2022-08-23 NOTE — PATIENT INSTRUCTIONS

## 2022-08-23 NOTE — PROGRESS NOTES
[facility] Dermatology  Michell Valentin MD  [phone]      Fadia Gabriel  1945    78 y.o. male     Date of Visit: 8/23/2022    Chief Complaint: skin lesions    History of Present Illness:    1. Follow-up for history of actinic keratoses-treated with both Efudex and cryotherapy in the past.  Here today for several new lesions on the scalp and face. 2.  Unknown duration of a nonhealing lesion on the left superior nasolabial fold. Dermatologic history:      Superficial BCC on the right lower shin - treated with curettage on 1/21/22. Superficial basal cell carcinoma of the left upper chest-treated with curettage on 3/5/2021. Nodular BCC left anterior shoulder-treated with electrodesiccation and curettage on 3/5/2021. Metatypical BCC on the right forehead-treated with Mohs by Dr. Mansoor Morataya on 1/27/2021. Small nodular BCC on the right superior shoulder-treated with curettage at the time of biopsy on 11/17/2020. Nodular BCC of the right mid vertex scalp-treated with Mohs by Dr. Mansoor Morataya on 3/11/2020. Small nodular BCCs on the right anterior upper arm and right mid arm-treated with curettage at the time of biopsy on 2/26/2019. Nodular BCC on the left upper chest history with curettage at the time of biopsy in October 2018. Superficial BCC of the right supraclavicular region-treated with curettage at time of biopsy on 1/3/2018. Nodular BCC on the right thigh - excised on 6/23/17. Superficial BCC on the right central upper back - treated with curettage on 3/28/17. Nodular BCC on the left central lower back - treated with curettage on 3/28/17. Nodular BCC on the right mid thigh-treated with curettage on 10/7/2016. Superficial basal cell carcinoma left upper chest-treated with curettage on 9/21/2016. Nodular BCC of the right mid central back-treated with curettage on 9/21/2016.   Superficial BCC of the left central lower back-treated with curettage on 3/3/2016.     4 BCCs treated with ED&C on 9/2015. Nodular BCC on the right lateral shoulder, superior  Nodular basal cell carcinoma on the right lateral shoulder, inferior  Superficial basal cell carcinoma of the right upper back  Superficial basal cell carcinoma of the central lower back      Bowen's disease of the left temple-treated with Efudex cream b.i.d. for 4 weeks. Superficial BCC, right posterior lateral arm-treated with curettage 3/7/14. BCC, right upper arm-excised 3/7/2014. BCC on the nasal dorsum (biopsy 10/2013) status post Mohs micrographic surgery in December 2013. Review of Systems:  Gen: Feels well, good sense of health. Skin: No new or changing moles. Past Medical History, Family History, Surgical History, Medications and Allergies reviewed. Past Medical History:   Diagnosis Date    Anxiety     BPH (benign prostatic hyperplasia) 5/31/2012    CAD (coronary artery disease)     Cancer ([facility])     SKIN    Hyperlipidemia     LBBB (left bundle branch block) 5/31/2012    Primary insomnia 10/31/2019    Sleep apnea 2011    USE CPAP     Past Surgical History:   Procedure Laterality Date    ANKLE FRACTURE SURGERY      ANKLE SURGERY Left 10/1/2020    REMOVAL OF HARDWARE LEFT ANKLE performed by Betsey King MD at [facility]  2006    MOHS SURGERY Right 03/11/2020    Right Mid Vertex Scalp    MOHS SURGERY  01/27/2021    R Forehead    TOTAL ANKLE ARTHROPLASTY Left 10/1/2020    LEFT ANKLE REPLACEMENT, GASTROCNEMIUS RECESSION; performed by Betsey King MD at [address]       No Known Allergies  Outpatient Medications Marked as Taking for the 8/23/22 encounter (Office Visit) with Joanna Talley MD   Medication Sig Dispense Refill    fluorouracil (EFUDEX) 5 % cream Apply to the forehead, temples and sides of the cheeks 2 times daily for 14 days.  40 g 0    LORazepam (ATIVAN) 0.5 MG tablet TAKE 1 TABLET BY MOUTH EVERY 8 HOURS AS NEEDED FOR ANXIETY 270 tablet 0    omeprazole (PRILOSEC) 40 MG delayed release capsule Take 1 capsule by mouth every morning (before breakfast) 90 capsule 1    clopidogrel (PLAVIX) 75 MG tablet TAKE 1 TABLET BY MOUTH  DAILY 90 tablet 3    rosuvastatin (CRESTOR) 20 MG tablet TAKE 1 TABLET BY MOUTH  DAILY 90 tablet 3    metFORMIN (GLUCOPHAGE-XR) 500 MG extended release tablet TAKE 1 TABLET BY MOUTH DAILY WITH BREAKFAST 90 tablet 3    tamsulosin (FLOMAX) 0.4 MG capsule TAKE 1 CAPSULE BY MOUTH  DAILY 90 capsule 3    triamcinolone (KENALOG) 0.1 % cream Apply to affected aresa on the right chest and right thigh twice daily for up to 2 weeks or until improved. 30 g 2    lisinopril (PRINIVIL;ZESTRIL) 10 MG tablet TAKE 1 TABLET BY MOUTH EVERY MORNING      olmesartan (BENICAR) 20 MG tablet Take 1 tablet by mouth      JUBLIA 10 % SOLN APPLY TO THE RIGHT BIG TONAIL AND 4TH TOENAIL ONCE A DAY AFTER BATHING 4 mL 0    Vitamin D (CHOLECALCIFEROL) 1000 UNITS CAPS capsule Take 1,000 Units by mouth daily. Physical Examination       The following were examined and determined to be normal: Psych/Neuro, Conjunctivae/eyelids, Gums/teeth/lips, Neck, Breast/axilla/chest, Abdomen, Back, RLE, and LLE. The following were examined and determined to be abnormal: Scalp/hair, Head/face, RUE, LUE, and Nails/digits. Well appearing. 1.  Left index finger - 1, left hand - 2, right forearm - 1, right helix - 1, crown of the scalp - 5, forehead - 4, left temple - 1: ill defined keratotic pink macules. 2.  Left superior nasolabial fold - 3 mm telangiectatic pink papule. Assessment and Plan     1. AK (actinic keratosis) - 15    Cryotherapy was discussed and patient agreed to proceed. Consent was obtained. 15 lesions were treated cryotherapy: Left index finger - 1, left hand - 2, right forearm - 1, right helix - 1, crown of the scalp - 5, forehead - 4, left temple - 1. 2 cycles of liquid nitrogen applied to each lesion for 5 seconds using a [employer] cryo spray gun. Patient was educated regarding the potential risks of blister formation and discomfort. Wound care was discussed. The patient tolerated the procedure well and there were no immediate complications. 2. Neoplasm of uncertain behavior of skin, left superior nasolabial fold - ? BCC    Discussed possible diagnosis; patient agreeable to proceed with biopsy (written consent obtained). Risks of the procedure were reviewed including discomfort, bleeding, scar and infection. The area(s) to be biopsied were marked with a surgical pen. Alcohol was used to cleanse the site. Local anesthesia was acheived with 1% lidocaine with epinephrine. Shave biopsy was performed using a razor blade. Hemostasis was achieved with aluminum chloride. The wound(s) were dressed with petrolatum and covered with a bandage. Wound care instructions were reviewed. 1 Specimen (s) sent to pathology. The specimen bottles were appropriately labeled. The patient tolerated the procedure well and there were no immediate complications. Return in about 3 months (around 11/23/2022).     --Flavio Jay MD

## 2022-08-25 DIAGNOSIS — C44.310 BCC (BASAL CELL CARCINOMA), FACE: Primary | ICD-10-CM

## 2022-08-25 LAB — DERMATOLOGY PATHOLOGY REPORT: ABNORMAL

## 2022-09-06 ENCOUNTER — PROCEDURE VISIT (OUTPATIENT)
Dept: SURGERY | Age: 77
End: 2022-09-06
Payer: MEDICARE

## 2022-09-06 VITALS — HEART RATE: 76 BPM | SYSTOLIC BLOOD PRESSURE: 171 MMHG | DIASTOLIC BLOOD PRESSURE: 97 MMHG

## 2022-09-06 DIAGNOSIS — C44.319 BASAL CELL CARCINOMA OF LEFT CHEEK: Primary | ICD-10-CM

## 2022-09-06 PROCEDURE — 17311 MOHS 1 STAGE H/N/HF/G: CPT | Performed by: DERMATOLOGY

## 2022-09-06 PROCEDURE — 12052 INTMD RPR FACE/MM 2.6-5.0 CM: CPT | Performed by: DERMATOLOGY

## 2022-09-06 RX ORDER — ASPIRIN 81 MG/1
81 TABLET ORAL DAILY
COMMUNITY

## 2022-09-06 NOTE — PROGRESS NOTES
MOHS PROCEDURE NOTE    PHYSICIAN:  Bethany Rao. Yuly Perkins MD, Who operated in two distinct and integrated capacities as the surgeon removing the tissue and as the pathologist examining the tissue. ASSISTANT: Skylar Hawley RN     REFERRING PROVIDER:   Toney Gurrola MD    PREOPERATIVE DIAGNOSIS: Nodular Basal Cell Carcinoma     SPECIFIC MOHS INDICATIONS:  location, clinically ill-defined borders, and need for tissue conservation    AUC SCORIN/9    POSTOPERATIVE DIAGNOSIS: SAME    LOCATION: Left upper nasolabial fold    OPERATIVE PROCEDURE:  MOHS MICROGRAPHIC SURGERY    RECONSTRUCTION OF DEFECT: Intermediate layered closure    PREOPERATIVE SIZE: 6x4 MM    DEFECT SIZE: 10x9 MM    LENGTH OF REPAIRED WOUND/SIZE OF FLAP/SIZE OF GRAFT:  26 MM    ANESTHESIA:  5mL 1% lidocaine with epinephrine 1:100,000 buffered. EBL:  MINIMAL    DURATION OF PROCEDURE:  40 MINUTES    POSTOPERATIVE OBSERVATION: 40 MINUTES    SPECIMENS:  SEE MOHS MAP    COMPLICATIONS:  NONE    DESCRIPTION OF PROCEDURE:  The patient was given a mirror, as appropriate, and the biopsy site was identified, marked with a surgical marking pen, and verified by the patient. Options for treatment were discussed and the patient was informed that Mohs surgery was the selected treatment based on its lower recurrence rate, given the features listed above, as compared to other treatment modalities such as excision, radiation, or curettage, and agreed with this treatment plan. Risks and benefits including bruising, swelling, bleeding, infection, nerve injury, recurrence, and scarring were discussed with the patient prior to the procedure and a written consent detailing these and other risks was reviewed with the patient and signed. There was a time out for person and procedure verification. The surgical site was prepped with an antiseptic solution. Application of an antiseptic solution was repeated before each surgical stage.       Stage I:  The clinically-apparent tumor was carefully defined and debulked, determining the edge of the surgical excision. A thin layer of tumor-laden tissue was excised with a narrow margin of normal-appearing skin, using the technique of Mohs. A map was prepared to correspond to the area of skin from which it was excised. Hemostasis was achieved using electrosurgery. The wound was bandaged. The tissue was prepared for the cryostat and sectioned. 1 section(s) prepared. Each section was coded, cut, and stained for microscopic examination. The entire base and margins of the excised piece of tissue were examined by the surgeon. The tissue was examined to the level of subcutaneous fat. Stage I:  inflammatory rxn c/w the pt's reported recent usage of efudex. No tumor was identified at the peripheral margins of stage I of microscopically controlled surgery. DEFECT MANAGEMENT:    REPAIR DESCRIPTION:  Various closure modalities were discussed with the patient, and it was decided that an intermediate layered repair would best preserve normal anatomic and functional relationships. Additional risk of wound dehiscence was discussed. The area was anesthetized with 1% lidocaine with epinephrine 1:100,000 buffered, was given a sterile prep using Chlorhexidine gluconate 4% solution and draped in the usual sterile fashion. Recreation and enlargement of the wound was performed by excising cones of tissue via the triangulation technique. The final incision lines were placed with respect for the patient's natural skin tension lines in a cresentic configuration to avoid functional and aesthetic distortion of adjacent free margins. Following minimal undermining, meticulous hemostasis was obtained with spot monopolar electrocoagulation.   Subcutaneous dead space and dermis were closed using 5-0 Vicryl buried subcutaneous interrupted suture and the epidermis was approximated with 6-0 Prolene running epidermal sutures. WOUND COVERAGE:  The wound was cleaned with normal saline solution, dried off, Aquaphor ointment was applied, and the wound was covered. A dressing was applied for stabilization and light pressure. The patient was given detailed oral and written instructions on postoperative care. There were no complications. The patient left the Unit in good medical condition. FOLLOW-UP:  The patient will return for suture removal in 7 days.

## 2022-09-06 NOTE — PATIENT INSTRUCTIONS
[facility] Office Hours:    Monday-Thursday  7:30 AM-4:30 PM    Friday  9:00 AM-1:00 PM      POST-OPERATIVE CARE FOR STICHES  Bandage change after 48 hours    CARING FOR YOUR SURGICAL SITE  The bandage should remain on and completely dry for 48 hours. Do NOT get the bandage wet. After the first 48 hours, gently remove the remaining part of the bandage. It can be helpful to moisten the bandage edges in the shower. Steri strips may still be on the wound. It is ok, they will fall off slowly with the daily bandage changes. Gently clean the wound daily with mild soap and water. Try to clean off crust and debris. Dry (pat) the area with a clean Q-tip or gauze. Apply a layer of Vaseline/ Aquaphor (or Bacitracin if your doctor recommends) to the wound area only. Cut a piece of Telfa (or any non-stick dressing) to fit just over the wound and secure it with paper tape. If the wound is small you may use a Band- Aid. Keep area covered for a total of 1 week(s). If the dressing comes off or if you have questions, or concerns about the dressing, please call the office for instructions! POST OPERATIVE INSTRUCTIONS    Activity: Do not lift anything heavier than a gallon of milk for 1 week. Also, avoid strenuous activity such as running, power walking or contact sports. Eating and drinking: Do not drink alcohol for 48 hours after your procedure. Alcohol increases the chances of bleeding. Medicines   -If you have discomfort, take Acetaminophen (Tylenol or Extra Strength Tylenol). Follow the instructions and warning on the bottle. -If your doctor has prescribed you an Aspirin daily, please keep taking it. Do not take extra Aspirin or medicines containing Aspirin (such as Emi-Medora and Excedrin) for 48 hours after your procedure. Bleeding: If bleeding occurs, DO NOT remove the bandage. Put firm pressure on the area with gauze for 20 minutes without peeking.  If the bleeding continues, apply pressure for another 20 minutes. If the bleeding does not stop after you apply pressure, call us right away. If you can not call, go to the nearest emergency room or urgent care facility. What to expect:  You may have these symptoms. They are normal and should get better with time:  Swelling. Swelling usually increases for the first 48 hours after your procedure and then begins to improve. Some soreness and redness around your wound. If we worked close to your eyes (forehead, nose, temple, or upper cheeks) your eyes may become swollen and/ or black and blue. Bruising, which could last 1 week or more. Pink and bumpy appearance to the scar. This may happen a few weeks after your procedure. After 4 weeks, you may gently massage the area each day with facial moisturizer or petroleum jelly (Vaseline or Aquaphor). This will help to smooth the skin and improve the appearance of the scar. The color of your scar will fade over time, but it may be pink for several months after the procedure. The scar may take 6 months to 1 year to reach its final color and appearance. \"Spitting\" suture. Occasionally, an inside suture (stitch) does not completely dissolve. When this happens, (generally 4-8 weeks after surgery), it causes a bump or \"pimple\" to form on the scar. This is easily removed and is not at all serious. It does not mean the skin cancer has returned. Contact us if it happens, but do not be alarmed. Vitamin E oil is NOT necessary. A good moisturizer is just as effective. Sunscreen IS necessary. Use at least and SPF 30 sunscreen daily- even in winter    Call us at [phone] right away if you have any of the following symptoms:  -Bleeding that you can not stop (see highlighted area above). -Pain that lasts longer than 48 hours.  -Your wound becomes more painful, red or hot.  -Swelling that does not begin to improve within the 48 hours or gets worse suddenly.

## 2022-09-07 ENCOUNTER — TELEPHONE (OUTPATIENT)
Dept: SURGERY | Age: 77
End: 2022-09-07

## 2022-09-07 NOTE — TELEPHONE ENCOUNTER
The patient was in the office on 9/6/22 for Mohs located on the L Upper nasolabial fold with ILC repair. The patient tolerated the procedure well and left the office in good condition. Pain level on post-operative day 1:  Pt states adequate    Any bleeding episode that required pressure to be held, bandage change or a call to the office or MD?  no     Any other issues?:  no    A post-operative telephone call was placed at 9/7/2022 at 12:04 PM   in order to check on the patient's recovery process. The patient reported doing well and had no complaints other than those listed above, if any. All of the patient's questions were answered.

## 2022-09-14 ENCOUNTER — OFFICE VISIT (OUTPATIENT)
Dept: SURGERY | Age: 77
End: 2022-09-14

## 2022-09-14 ENCOUNTER — TELEPHONE (OUTPATIENT)
Dept: SURGERY | Age: 77
End: 2022-09-14

## 2022-09-14 DIAGNOSIS — Z48.02 VISIT FOR SUTURE REMOVAL: Primary | ICD-10-CM

## 2022-09-14 PROCEDURE — 99024 POSTOP FOLLOW-UP VISIT: CPT | Performed by: DERMATOLOGY

## 2022-09-14 NOTE — TELEPHONE ENCOUNTER
Per Dr. Jessica Connors, the pt was added to her schedule today for 3:15 pm.  LVM for pt to arrive 15 min early for a 3:15 pm appt w/Dr. Jessica Connors.

## 2022-09-14 NOTE — TELEPHONE ENCOUNTER
Pt left voice message stating he thinks he should see Dr. Darwin Logan today instead a nurse visit due to a lumpy area under the incision that he's concerned about. Please call pt to see if he needs to arrive earlier than 3:45 pm for nurse visit.

## 2022-09-14 NOTE — PROGRESS NOTES
S:  The patient is here for suture removal s/p Mohs surgery on the L Upper NL fold and Intermediate layered closure repair, 1 week(s) ago. The site appears well-healed without signs of infection (redness, pain or discharge). The sutures were removed. liquid skin adhesive was applied for additional wound adhesion. Wound care and activity instructions given. The patient was scheduled for follow-up 8-12 weeks prn for scar/wound check. The patient was scheduled for f/u with General Dermatology per their instructions. Electronically signed by Nina Guerrero RN on 9/14/2022 at 3:24 PM    I, Neva Hodgkin, RN am scribing in the presence of Dr. Faheem Beaver 9/14/2022, 3:25 PM.     Dewayne Nolan MD,  personally performed the services described in this documentation as scribed by Zahraa Hernandez RN  in my presence, and it is both accurate and complete.      Electronically signed by Talia Coffey MD on 9/15/2022 at 11:28 AM

## 2022-10-12 ENCOUNTER — OFFICE VISIT (OUTPATIENT)
Dept: SURGERY | Age: 77
End: 2022-10-12

## 2022-10-12 DIAGNOSIS — L90.5 SCAR: Primary | ICD-10-CM

## 2022-10-12 PROCEDURE — 99024 POSTOP FOLLOW-UP VISIT: CPT | Performed by: DERMATOLOGY

## 2022-10-12 NOTE — PROGRESS NOTES
S: The patient is here for scar check s/p mohs on the left superior nlf with Intermediate layered closure repair, 5 weeks ago. The patient c/o some firmness in area but no pain. O: The scar is well-approximated and shows no signs of abnormal healing (expected amount of erythema, fullness and coloration), looks great - difficult to see. Upon firm pressure there is a slight subcut full ness in the area c/w normal scar tissue  A/P:scar - expect continued improvement of the firmness with time not requiring any intervention  Patient questions answered and expectations reviewed. The patient is scheduled for f/u scar check as needed. and skin examination with General Dermatology per their recommendations.

## 2022-11-09 ENCOUNTER — TELEPHONE (OUTPATIENT)
Dept: DERMATOLOGY | Age: 77
End: 2022-11-09

## 2022-11-09 RX ORDER — TAMSULOSIN HYDROCHLORIDE 0.4 MG/1
CAPSULE ORAL
Qty: 90 CAPSULE | Refills: 3 | Status: SHIPPED | OUTPATIENT
Start: 2022-11-09

## 2022-11-09 NOTE — TELEPHONE ENCOUNTER
[phone]. Patient currently sees  and is asking if his brother Anna Gandhi can be seen as a new patient by . I informed patient that  currently was not taking any new patients but he did say he has given up an appointment in the past, for his wife Maco Zafar, and  took his wife on as a new patient during that appointment time. The patient is now asking if he can give up his appointment at the end of the month for his brother, for  to take on as a new patient. He said  did this before and he's wanting to know if he can do it again.      Please advise, thank you!!

## 2022-11-10 NOTE — TELEPHONE ENCOUNTER
Advised patient that Dr Adiel Lopez would like him to keep his appointment for 11/29/22 due to his history of skin cancer and that we'll watch for a cancellation for his brother     Will copy message into patient's chart.

## 2022-11-18 DIAGNOSIS — F51.01 PRIMARY INSOMNIA: ICD-10-CM

## 2022-11-18 RX ORDER — ZOLPIDEM TARTRATE 6.25 MG/1
6.25 TABLET, FILM COATED, EXTENDED RELEASE ORAL NIGHTLY PRN
Qty: 30 TABLET | Refills: 0 | Status: SHIPPED | OUTPATIENT
Start: 2022-11-18 | End: 2022-12-18

## 2022-11-18 NOTE — TELEPHONE ENCOUNTER
Please refill-     zolpidem (AMBIEN CR) 12.5 MG extended release tablet     Pt has appt 11-30-22      [facility] #[id number], [address] [place] [address] -  [phone]      Please advise

## 2022-11-29 ENCOUNTER — OFFICE VISIT (OUTPATIENT)
Dept: DERMATOLOGY | Age: 77
End: 2022-11-29
Payer: MEDICARE

## 2022-11-29 DIAGNOSIS — L57.0 ACTINIC KERATOSIS: Primary | ICD-10-CM

## 2022-11-29 DIAGNOSIS — L82.1 SK (SEBORRHEIC KERATOSIS): ICD-10-CM

## 2022-11-29 PROCEDURE — 17003 DESTRUCT PREMALG LES 2-14: CPT | Performed by: DERMATOLOGY

## 2022-11-29 PROCEDURE — 99212 OFFICE O/P EST SF 10 MIN: CPT | Performed by: DERMATOLOGY

## 2022-11-29 PROCEDURE — 1123F ACP DISCUSS/DSCN MKR DOCD: CPT | Performed by: DERMATOLOGY

## 2022-11-29 PROCEDURE — 17000 DESTRUCT PREMALG LESION: CPT | Performed by: DERMATOLOGY

## 2022-11-29 NOTE — PROGRESS NOTES
[facility] Dermatology  Henrietta Deal MD  [phone]      Sandi Howard  1945    78 y.o. male     Date of Visit: 11/29/2022    Chief Complaint: skin lesions    History of Present Illness:    1. He presents today to follow up for a history of AKs - treated with cryotherapy and Efudex in the past.  Here today for new lesions. 2.  He also reports multiple asymptomatic growths on the extremities. Derm Hx:   Nodular BCC on the left upper nasolabial fold-treated with Mohs by Dr. Evelyn Gomez on 9/6/2022. Superficial BCC on the right lower shin - treated with curettage on 1/21/22. Superficial basal cell carcinoma of the left upper chest-treated with curettage on 3/5/2021. Nodular BCC left anterior shoulder-treated with electrodesiccation and curettage on 3/5/2021. Metatypical BCC on the right forehead-treated with Mohs by Dr. Evelyn Gomez on 1/27/2021. Small nodular BCC on the right superior shoulder-treated with curettage at the time of biopsy on 11/17/2020. Nodular BCC of the right mid vertex scalp-treated with Mohs by Dr. Evelyn Gomez on 3/11/2020. Small nodular BCCs on the right anterior upper arm and right mid arm-treated with curettage at the time of biopsy on 2/26/2019. Nodular BCC on the left upper chest history with curettage at the time of biopsy in October 2018. Superficial BCC of the right supraclavicular region-treated with curettage at time of biopsy on 1/3/2018. Nodular BCC on the right thigh - excised on 6/23/17. Superficial BCC on the right central upper back - treated with curettage on 3/28/17. Nodular BCC on the left central lower back - treated with curettage on 3/28/17. Nodular BCC on the right mid thigh-treated with curettage on 10/7/2016. Superficial basal cell carcinoma left upper chest-treated with curettage on 9/21/2016. Nodular BCC of the right mid central back-treated with curettage on 9/21/2016.   Superficial BCC of the left central lower back-treated with curettage on 3/3/2016.     4 BCCs treated with ED&C on 9/2015. Nodular BCC on the right lateral shoulder, superior  Nodular basal cell carcinoma on the right lateral shoulder, inferior  Superficial basal cell carcinoma of the right upper back  Superficial basal cell carcinoma of the central lower back      Bowen's disease of the left temple-treated with Efudex cream b.i.d. for 4 weeks. Superficial BCC, right posterior lateral arm-treated with curettage 3/7/14. BCC, right upper arm-excised 3/7/2014. BCC on the nasal dorsum (biopsy 10/2013) status post Mohs micrographic surgery in December 2013. On Plavix and ASA. Review of Systems:  Gen: Feels well, good sense of health. Skin: No new or changing moles. Past Medical History, Family History, Surgical History, Medications and Allergies reviewed. Past Medical History:   Diagnosis Date    Anxiety     BPH (benign prostatic hyperplasia) 5/31/2012    CAD (coronary artery disease)     Cancer (HCC)     SKIN    Hyperlipidemia     LBBB (left bundle branch block) 5/31/2012    Primary insomnia 10/31/2019    Sleep apnea 2011    USE CPAP     Past Surgical History:   Procedure Laterality Date    ANKLE FRACTURE SURGERY      ANKLE SURGERY Left 10/1/2020    REMOVAL OF HARDWARE LEFT ANKLE performed by Denilson Conroy MD at [facility]  2006    MOHS SURGERY Right 03/11/2020    Right Mid Vertex Scalp    MOHS SURGERY  01/27/2021    R Forehead    TOTAL ANKLE ARTHROPLASTY Left 10/1/2020    LEFT ANKLE REPLACEMENT, GASTROCNEMIUS RECESSION; performed by Denilson Conroy MD at [facility] [address]       No Known Allergies  Outpatient Medications Marked as Taking for the 11/29/22 encounter (Office Visit) with Abebe Tong MD   Medication Sig Dispense Refill    zolpidem (AMBIEN CR) 6.25 MG extended release tablet Take 1 tablet by mouth nightly as needed for Sleep for up to 30 days.  30 tablet 0    tamsulosin (FLOMAX) 0.4 MG capsule TAKE 1 CAPSULE BY MOUTH  DAILY 90 capsule 3    aspirin 81 MG EC tablet Take 81 mg by mouth daily      fluorouracil (EFUDEX) 5 % cream Apply to the forehead, temples and sides of the cheeks 2 times daily for 14 days. 40 g 0    omeprazole (PRILOSEC) 40 MG delayed release capsule Take 1 capsule by mouth every morning (before breakfast) 90 capsule 1    clopidogrel (PLAVIX) 75 MG tablet TAKE 1 TABLET BY MOUTH  DAILY 90 tablet 3    rosuvastatin (CRESTOR) 20 MG tablet TAKE 1 TABLET BY MOUTH  DAILY 90 tablet 3    metFORMIN (GLUCOPHAGE-XR) 500 MG extended release tablet TAKE 1 TABLET BY MOUTH DAILY WITH BREAKFAST 90 tablet 3    triamcinolone (KENALOG) 0.1 % cream Apply to affected aresa on the right chest and right thigh twice daily for up to 2 weeks or until improved. 30 g 2    lisinopril (PRINIVIL;ZESTRIL) 10 MG tablet TAKE 1 TABLET BY MOUTH EVERY MORNING      olmesartan (BENICAR) 20 MG tablet Take 1 tablet by mouth      JUBLIA 10 % SOLN APPLY TO THE RIGHT BIG TONAIL AND 4TH TOENAIL ONCE A DAY AFTER BATHING 4 mL 0    Vitamin D (CHOLECALCIFEROL) 1000 UNITS CAPS capsule Take 1,000 Units by mouth daily. Physical Examination       The following were examined and determined to be normal: Psych/Neuro, Conjunctivae/eyelids, Gums/teeth/lips, Neck, Breast/axilla/chest, Abdomen, Back, RUE, LUE, RLE, LLE, and Nails/digits. The following were examined and determined to be abnormal: Scalp/hair and Head/face. Well appearing. 1.  Vertex/crown of the scalp - 6, frontal scalp/forehead, right temple - 6: ill defined keratotic pink macules. 2.  Extremities and back - stuck-on appearing grey-brown verrucous papules and plaques. Assessment and Plan     1. Actinic keratoses - multiple    Cryotherapy was discussed and patient agreed to proceed. Consent was obtained.   12 lesions were treated cryotherapy: Vertex/crown of the scalp - 6, frontal scalp/forehead, right temple - 6. 2 cycles of liquid nitrogen applied to each lesion for 5 seconds using a Cry-Ac cryo spray gun. Patient was educated regarding the potential risks of blister formation and discomfort. Wound care was discussed. The patient tolerated the procedure well and there were no immediate complications. 2. SK (seborrheic keratosis) -     Reassurance. Return in about 3 months (around 2/28/2023).     --Delia Harley MD

## 2022-12-28 DIAGNOSIS — F41.9 ANXIETY: Primary | ICD-10-CM

## 2022-12-28 DIAGNOSIS — F51.01 PRIMARY INSOMNIA: ICD-10-CM

## 2022-12-28 RX ORDER — ZOLPIDEM TARTRATE 6.25 MG/1
6.25 TABLET, FILM COATED, EXTENDED RELEASE ORAL NIGHTLY PRN
Qty: 7 TABLET | Refills: 0 | Status: SHIPPED | OUTPATIENT
Start: 2022-12-28 | End: 2023-01-10 | Stop reason: SDUPTHER

## 2022-12-28 RX ORDER — LORAZEPAM 0.5 MG/1
TABLET ORAL
Qty: 21 TABLET | Refills: 0 | Status: SHIPPED | OUTPATIENT
Start: 2022-12-28 | End: 2023-01-04

## 2022-12-28 NOTE — TELEPHONE ENCOUNTER
Orders Placed This Encounter   Medications    LORazepam (ATIVAN) 0.5 MG tablet     Sig: TAKE 1 TABLET BY MOUTH EVERY 8 HOURS AS NEEDED FOR ANXIETY     Dispense:  21 tablet     Refill:  0    zolpidem (AMBIEN CR) 6.25 MG extended release tablet     Sig: Take 1 tablet by mouth nightly as needed for Sleep for up to 7 days. Max Daily Amount: 6.25 mg     Dispense:  7 tablet     Refill:  0     Controlled Substance Monitoring:    Acute and Chronic Pain Monitoring:   RX Monitoring 12/28/2022   Attestation -   Periodic Controlled Substance Monitoring No signs of potential drug abuse or diversion identified.        Medication (7 days) sent to his pharmacies

## 2022-12-28 NOTE — TELEPHONE ENCOUNTER
zolpidem (AMBIEN CR) 6.25 MG extended release tablet    --Call this in to [facility] [id number], [address]   [address] [place] [address],[address] [place] [address]   Phone:  [phone]  Fax:  [phone]    LORazepam (ATIVAN) 0.5 MG tablet  --Call in to  Sunny Aranda [address] [place] [address] [phone] [facility] [phone]   [facility], [phone]   Phone:  [phone]  Fax:  [phone]      Number: [phone]

## 2022-12-29 ENCOUNTER — TELEPHONE (OUTPATIENT)
Dept: ORTHOPEDIC SURGERY | Age: 77
End: 2022-12-29

## 2022-12-29 NOTE — TELEPHONE ENCOUNTER
Submitted PA for Zolpidem Tartrate ER 6.25MG er tablets  Via CMM Key: [id number] STATUS: DENIED; Denial letter attached. If this requires a response please respond to the pool ( P [facility]X [phone]). Thank you please advise patient.

## 2023-01-09 DIAGNOSIS — F51.01 PRIMARY INSOMNIA: ICD-10-CM

## 2023-01-09 NOTE — TELEPHONE ENCOUNTER
Please call both in to respective pharmacies and do 90 day supply of both     LORazepam (ATIVAN) 0.5 MG tablet    [address], [address] [place] [address], [address] [phone]   Phone:  [phone]  Fax:  [phone]      zolpidem (AMBIEN CR) 6.25 MG extended release tablet     [facility] [id number], [address] [facility] [phone] [facility] [phone]   [address] [place] [address],[address], [address] [address]   Phone:  [phone]  Fax:  [phone]    IS COMPLETELY OUT OF BOTH MEDICATIONS  Number: [phone]

## 2023-01-10 RX ORDER — LORAZEPAM 0.5 MG/1
0.5 TABLET ORAL EVERY 8 HOURS PRN
Qty: 270 TABLET | Refills: 0 | Status: SHIPPED | OUTPATIENT
Start: 2023-01-10 | End: 2023-04-10

## 2023-01-10 RX ORDER — ZOLPIDEM TARTRATE 6.25 MG/1
6.25 TABLET, FILM COATED, EXTENDED RELEASE ORAL NIGHTLY PRN
Qty: 90 TABLET | Refills: 0 | Status: SHIPPED | OUTPATIENT
Start: 2023-01-10 | End: 2023-04-10

## 2023-01-25 ENCOUNTER — OFFICE VISIT (OUTPATIENT)
Dept: INTERNAL MEDICINE CLINIC | Age: 78
End: 2023-01-25
Payer: MEDICARE

## 2023-01-25 VITALS
HEART RATE: 85 BPM | DIASTOLIC BLOOD PRESSURE: 76 MMHG | BODY MASS INDEX: 28.13 KG/M2 | OXYGEN SATURATION: 96 % | TEMPERATURE: 99.1 F | WEIGHT: 185 LBS | SYSTOLIC BLOOD PRESSURE: 139 MMHG

## 2023-01-25 DIAGNOSIS — R35.0 BENIGN PROSTATIC HYPERPLASIA WITH URINARY FREQUENCY: ICD-10-CM

## 2023-01-25 DIAGNOSIS — Z01.818 PREOP EXAMINATION: Primary | ICD-10-CM

## 2023-01-25 DIAGNOSIS — E11.9 TYPE 2 DIABETES MELLITUS WITHOUT COMPLICATION, WITHOUT LONG-TERM CURRENT USE OF INSULIN (HCC): ICD-10-CM

## 2023-01-25 DIAGNOSIS — N40.1 BENIGN PROSTATIC HYPERPLASIA WITH URINARY FREQUENCY: ICD-10-CM

## 2023-01-25 PROCEDURE — 93000 ELECTROCARDIOGRAM COMPLETE: CPT | Performed by: INTERNAL MEDICINE

## 2023-01-25 PROCEDURE — 1123F ACP DISCUSS/DSCN MKR DOCD: CPT | Performed by: INTERNAL MEDICINE

## 2023-01-25 PROCEDURE — 99214 OFFICE O/P EST MOD 30 MIN: CPT | Performed by: INTERNAL MEDICINE

## 2023-01-25 NOTE — PROGRESS NOTES
Preoperative Consultation      Tom Worthington  YOB: 1945    Date of Service:  1/25/2023    Chief Complaint   Patient presents with    Pre-op Exam       This patient presents to the office today for a preoperative consultation at the request of surgeon, Dr. Kiesha Velásquez    To have cysto and prostate US  Planned anesthesia:  General  Known anesthesia problems: None  Bleeding risk: On plavix  Personal or FH of DVT/PE:  None  Patient objection to receiving blood products: No      Past Medical History:   Diagnosis Date    Anxiety     BPH (benign prostatic hyperplasia) 5/31/2012    CAD (coronary artery disease)     Cancer (HCC)     SKIN    Hyperlipidemia     LBBB (left bundle branch block) 5/31/2012    Primary insomnia 10/31/2019    Sleep apnea 2011    USE CPAP       Past Surgical History:   Procedure Laterality Date    ANKLE FRACTURE SURGERY      ANKLE SURGERY Left 10/1/2020    REMOVAL OF HARDWARE LEFT ANKLE performed by Edith Calvillo MD at [place] [address]    MOHS SURGERY Right 03/11/2020    Right Mid Vertex Scalp    MOHS SURGERY  01/27/2021    R Forehead    TOTAL ANKLE ARTHROPLASTY Left 10/1/2020    LEFT ANKLE REPLACEMENT, GASTROCNEMIUS RECESSION; performed by Edith Calvillo MD at [address] [place] [address] [address]       No Known Allergies    Outpatient Medications Marked as Taking for the 1/25/23 encounter (Office Visit) with Deep Venegas MD   Medication Sig Dispense Refill    LORazepam (ATIVAN) 0.5 MG tablet Take 1 tablet by mouth every 8 hours as needed for Anxiety for up to 90 days. 270 tablet 0    zolpidem (AMBIEN CR) 6.25 MG extended release tablet Take 1 tablet by mouth nightly as needed for Sleep for up to 90 days.  Max Daily Amount: 6.25 mg 90 tablet 0    tamsulosin (FLOMAX) 0.4 MG capsule TAKE 1 CAPSULE BY MOUTH  DAILY 90 capsule 3    aspirin 81 MG EC tablet Take 81 mg by mouth daily      fluorouracil (EFUDEX) 5 % cream Apply to the forehead, temples and sides of the cheeks 2 times daily for 14 days. 40 g 0    omeprazole (PRILOSEC) 40 MG delayed release capsule Take 1 capsule by mouth every morning (before breakfast) 90 capsule 1    clopidogrel (PLAVIX) 75 MG tablet TAKE 1 TABLET BY MOUTH  DAILY 90 tablet 3    rosuvastatin (CRESTOR) 20 MG tablet TAKE 1 TABLET BY MOUTH  DAILY 90 tablet 3    metFORMIN (GLUCOPHAGE-XR) 500 MG extended release tablet TAKE 1 TABLET BY MOUTH DAILY WITH BREAKFAST 90 tablet 3    triamcinolone (KENALOG) 0.1 % cream Apply to affected aresa on the right chest and right thigh twice daily for up to 2 weeks or until improved. 30 g 2    lisinopril (PRINIVIL;ZESTRIL) 10 MG tablet TAKE 1 TABLET BY MOUTH EVERY MORNING      olmesartan (BENICAR) 20 MG tablet Take 1 tablet by mouth      JUBLIA 10 % SOLN APPLY TO THE RIGHT BIG TONAIL AND 4TH TOENAIL ONCE A DAY AFTER BATHING 4 mL 0    Vitamin D (CHOLECALCIFEROL) 1000 UNITS CAPS capsule Take 1,000 Units by mouth daily. Family History   Problem Relation Age of Onset    Cancer Mother     Heart Disease Father     Thyroid Disease Brother        Social History     Socioeconomic History    Marital status:      Spouse name: Not on file    Number of children: Not on file    Years of education: Not on file    Highest education level: Not on file   Occupational History    Not on file   Tobacco Use    Smoking status: Never    Smokeless tobacco: Never   Vaping Use    Vaping Use: Never used   Substance and Sexual Activity    Alcohol use:  Yes     Alcohol/week: 1.7 standard drinks     Types: 2 Standard drinks or equivalent per week     Comment: OCC    Drug use: Never    Sexual activity: Not on file   Other Topics Concern    Not on file   Social History Narrative    Not on file     Social Determinants of Health     Financial Resource Strain: Low Risk     Difficulty of Paying Living Expenses: Not hard at all   Food Insecurity: No Food Insecurity    Worried About [address] in the Last Year: Never true    [address] in the Last Year: Never true   Transportation Needs: Not on file   Physical Activity: Not on file   Stress: Not on file   Social Connections: Not on file   Intimate Partner Violence: Not on file   Housing Stability: Not on file       ROS:    Constitutional:  Denies fever or chills   Eyes:  Denies change in visual acuity   HENT:  Denies nasal congestion or sore throat   Respiratory:  Denies cough or shortness of breath   Cardiovascular:  Denies chest pain or edema   GI:  Denies abdominal pain, nausea, vomiting, bloody stools or diarrhea   :  Denies dysuria   Musculoskeletal:  Denies back pain or joint pain   Integument:  Denies rash   Neurologic:  Denies headache, focal weakness or sensory changes   Endocrine:  Denies polyuria or polydipsia   Lymphatic:  Denies swollen glands   Psychiatric:  Denies depression or anxiety       Physical Exam:    There were no vitals filed for this visit. Wt Readings from Last 2 Encounters:   08/09/22 196 lb (88.9 kg)   05/02/22 191 lb (86.6 kg)     BP Readings from Last 3 Encounters:   09/06/22 (!) 171/97   08/09/22 (!) 150/80   05/02/22 132/78        Constitutional:  Well developed, well nourished, no acute distress, non-toxic appearance   Eyes:  PERRL, conjunctiva normal   HENT:  Atraumatic, external ears normal, nose normal, oropharynx moist, no pharyngeal exudates. Neck- normal range of motion, no tenderness, supple   Respiratory:  No respiratory distress, normal breath sounds, no rales, no wheezing   Cardiovascular:  Normal rate, normal rhythm, no murmurs, no gallops, no rubs   GI:  Soft, nondistended, normal bowel sounds, nontender, no organomegaly, no mass, no rebound, no guarding   :  No costovertebral angle tenderness   Musculoskeletal:  No edema, no tenderness, no deformities.  Back- no tenderness  Integument:  Well hydrated, no rash   Lymphatic:  No lymphadenopathy noted   Neurologic:  Alert & oriented x 3, CN 2-12 normal, normal motor function, normal sensory function, no focal deficits noted   Psychiatric:  Speech and behavior appropriate          Assessment:       78 y.o. patient with planned surgery as above. Known risk factors for perioperative complications: Hx of CAD  No contraindications to planned surgery  Pre-Operative Risk assessment using 2014 ACC/AHA guidelines     Emergent procedure NO  Active Cardiac Condition NO (decompensated HF, Arrhythmia, MI <3 weeks, severe valve disease)  Risk Level of Procedure Intermediate Risk (intraperitoneal, intrathoracic, HENT, orthopedic, or carotid endarterectomy, etc.)  Revised Cardiac Risk Index Risk factors: History of ischemic heart disease  Measurement of Exercise Tolerance before Surgery >4 YES    According to the 2014 ACC/AHA pre-operative risk assessment guidelines Nick Mcgovern is a low risk for major cardiac complications during a intermediate risk procedure and may continue as planned. Specific medication recommendations are listed below. Medications recommended to continue should be taken with a sip of water even when NPO. Further recommendations from consultants: None    Medication Recommendations:  ACEI/ARB Hold one dose piror to surgery     Plan:     1. Preoperative workup as follows: History and physical, EKG  2. Change in medication regimen before surgery: Hold blood pressure meds with sip of water on the day of surgery  3. Prophylaxis for cardiac events with perioperative beta-blockers:  Not indicated  4.  Deep vein thrombosis prophylaxis:  Early ambulation  Rodrigo Mcarthur

## 2023-01-26 DIAGNOSIS — D64.9 ANEMIA, UNSPECIFIED TYPE: Primary | ICD-10-CM

## 2023-01-26 DIAGNOSIS — N40.1 BENIGN PROSTATIC HYPERPLASIA WITH URINARY FREQUENCY: ICD-10-CM

## 2023-01-26 DIAGNOSIS — D64.9 ANEMIA, UNSPECIFIED TYPE: ICD-10-CM

## 2023-01-26 DIAGNOSIS — Z01.818 PREOP EXAMINATION: ICD-10-CM

## 2023-01-26 DIAGNOSIS — E11.9 TYPE 2 DIABETES MELLITUS WITHOUT COMPLICATION, WITHOUT LONG-TERM CURRENT USE OF INSULIN (HCC): ICD-10-CM

## 2023-01-26 DIAGNOSIS — R35.0 BENIGN PROSTATIC HYPERPLASIA WITH URINARY FREQUENCY: ICD-10-CM

## 2023-01-26 LAB
A/G RATIO: 1.8 (ref 1.1–2.2)
ALBUMIN SERPL-MCNC: 4.2 G/DL (ref 3.4–5)
ALP BLD-CCNC: 55 U/L (ref 40–129)
ALT SERPL-CCNC: 13 U/L (ref 10–40)
ANION GAP SERPL CALCULATED.3IONS-SCNC: 11 MMOL/L (ref 3–16)
AST SERPL-CCNC: 16 U/L (ref 15–37)
BASOPHILS ABSOLUTE: 0 K/UL (ref 0–0.2)
BASOPHILS RELATIVE PERCENT: 0.7 %
BILIRUB SERPL-MCNC: 0.4 MG/DL (ref 0–1)
BUN BLDV-MCNC: 13 MG/DL (ref 7–20)
CALCIUM SERPL-MCNC: 9.3 MG/DL (ref 8.3–10.6)
CHLORIDE BLD-SCNC: 103 MMOL/L (ref 99–110)
CO2: 25 MMOL/L (ref 21–32)
CREAT SERPL-MCNC: 1 MG/DL (ref 0.8–1.3)
EOSINOPHILS ABSOLUTE: 0.1 K/UL (ref 0–0.6)
EOSINOPHILS RELATIVE PERCENT: 2.3 %
GFR SERPL CREATININE-BSD FRML MDRD: >60 ML/MIN/{1.73_M2}
GLUCOSE BLD-MCNC: 124 MG/DL (ref 70–99)
HCT VFR BLD CALC: 38.8 % (ref 40.5–52.5)
HEMOGLOBIN: 13 G/DL (ref 13.5–17.5)
IRON SATURATION: 32 % (ref 20–50)
IRON: 102 UG/DL (ref 59–158)
LYMPHOCYTES ABSOLUTE: 1.3 K/UL (ref 1–5.1)
LYMPHOCYTES RELATIVE PERCENT: 22 %
MCH RBC QN AUTO: 31 PG (ref 26–34)
MCHC RBC AUTO-ENTMCNC: 33.5 G/DL (ref 31–36)
MCV RBC AUTO: 92.4 FL (ref 80–100)
MONOCYTES ABSOLUTE: 0.6 K/UL (ref 0–1.3)
MONOCYTES RELATIVE PERCENT: 10.1 %
NEUTROPHILS ABSOLUTE: 3.8 K/UL (ref 1.7–7.7)
NEUTROPHILS RELATIVE PERCENT: 64.9 %
PDW BLD-RTO: 12.9 % (ref 12.4–15.4)
PLATELET # BLD: 222 K/UL (ref 135–450)
PMV BLD AUTO: 8.3 FL (ref 5–10.5)
POTASSIUM SERPL-SCNC: 4.2 MMOL/L (ref 3.5–5.1)
RBC # BLD: 4.2 M/UL (ref 4.2–5.9)
SODIUM BLD-SCNC: 139 MMOL/L (ref 136–145)
TOTAL IRON BINDING CAPACITY: 321 UG/DL (ref 260–445)
TOTAL PROTEIN: 6.5 G/DL (ref 6.4–8.2)
WBC # BLD: 5.9 K/UL (ref 4–11)

## 2023-01-27 LAB
ESTIMATED AVERAGE GLUCOSE: 145.6 MG/DL
HBA1C MFR BLD: 6.7 %

## 2023-02-02 ENCOUNTER — TELEPHONE (OUTPATIENT)
Dept: INTERNAL MEDICINE CLINIC | Age: 78
End: 2023-02-02

## 2023-02-02 NOTE — TELEPHONE ENCOUNTER
----- Message from [place] Vickie sent at 2/2/2023 10:30 AM EST -----  Subject: Message to Provider    QUESTIONS  Information for Provider? Hilary Muniz called in today requesting a prevagen to   help with patients dementia   ---------------------------------------------------------------------------  --------------  CALL BACK INFO  [phone]; OK to leave message on voicemail  ---------------------------------------------------------------------------  --------------  SCRIPT ANSWERS  Relationship to Patient? Other  Representative Name? Hilary Muniz  Is the Representative on the appropriate HIPAA document in Epic?  Yes

## 2023-02-02 NOTE — TELEPHONE ENCOUNTER
This is an over the counter medication and has not been proven to be affective. Message left for spouse.

## 2023-02-27 ENCOUNTER — OFFICE VISIT (OUTPATIENT)
Dept: DERMATOLOGY | Age: 78
End: 2023-02-27
Payer: MEDICARE

## 2023-02-27 DIAGNOSIS — L30.9 CHRONIC DERMATITIS: ICD-10-CM

## 2023-02-27 DIAGNOSIS — L57.0 ACTINIC KERATOSIS: Primary | ICD-10-CM

## 2023-02-27 DIAGNOSIS — L81.4 SOLAR LENTIGINOSIS: ICD-10-CM

## 2023-02-27 PROCEDURE — 17000 DESTRUCT PREMALG LESION: CPT | Performed by: DERMATOLOGY

## 2023-02-27 PROCEDURE — 99213 OFFICE O/P EST LOW 20 MIN: CPT | Performed by: DERMATOLOGY

## 2023-02-27 PROCEDURE — 1123F ACP DISCUSS/DSCN MKR DOCD: CPT | Performed by: DERMATOLOGY

## 2023-02-27 PROCEDURE — 17003 DESTRUCT PREMALG LES 2-14: CPT | Performed by: DERMATOLOGY

## 2023-02-27 NOTE — PROGRESS NOTES
[address] [facility] Dermatology  Garrett Michelle MD  [phone]      Luis Miguel Plummer  1945    78 y.o. male     Date of Visit: 2/27/2023    Chief Complaint: skin lesions    History of Present Illness:    1. He has a hx of AKs - treated with Efudex and cryotherapy in the past.  Has several new lesions on the scalp and face. 2.  Has a history of chronic dermatitis that comes and goes. Improves with triamcinolone 0.1% cream.      3.  He has stable pigmented lesions on the extremities. Derm Hx:   Nodular BCC on the left upper nasolabial fold-treated with Mohs by Dr. Toney Rebolledo on 9/6/2022. Superficial BCC on the right lower shin - treated with curettage on 1/21/22. Superficial basal cell carcinoma of the left upper chest-treated with curettage on 3/5/2021. Nodular BCC left anterior shoulder-treated with electrodesiccation and curettage on 3/5/2021. Metatypical BCC on the right forehead-treated with Mohs by Dr. Toney Rebolledo on 1/27/2021. Small nodular BCC on the right superior shoulder-treated with curettage at the time of biopsy on 11/17/2020. Nodular BCC of the right mid vertex scalp-treated with Mohs by Dr. Toney Rebolledo on 3/11/2020. Small nodular BCCs on the right anterior upper arm and right mid arm-treated with curettage at the time of biopsy on 2/26/2019. Nodular BCC on the left upper chest history with curettage at the time of biopsy in October 2018. Superficial BCC of the right supraclavicular region-treated with curettage at time of biopsy on 1/3/2018. Nodular BCC on the right thigh - excised on 6/23/17. Superficial BCC on the right central upper back - treated with curettage on 3/28/17. Nodular BCC on the left central lower back - treated with curettage on 3/28/17. Nodular BCC on the right mid thigh-treated with curettage on 10/7/2016. Superficial basal cell carcinoma left upper chest-treated with curettage on 9/21/2016.   Nodular BCC of the right mid central back-treated with curettage on 9/21/2016. Superficial BCC of the left central lower back-treated with curettage on 3/3/2016.     4 BCCs treated with ED&C on 9/2015. Nodular BCC on the right lateral shoulder, superior  Nodular basal cell carcinoma on the right lateral shoulder, inferior  Superficial basal cell carcinoma of the right upper back  Superficial basal cell carcinoma of the central lower back      Bowen's disease of the left temple-treated with Efudex cream b.i.d. for 4 weeks. Superficial BCC, right posterior lateral arm-treated with curettage 3/7/14. BCC, right upper arm-excised 3/7/2014. BCC on the nasal dorsum (biopsy 10/2013) status post Mohs micrographic surgery in December 2013. Review of Systems:  Gen: Feels well, good sense of health. Past Medical History, Family History, Surgical History, Medications and Allergies reviewed. Past Medical History:   Diagnosis Date    Anxiety     BPH (benign prostatic hyperplasia) 5/31/2012    CAD (coronary artery disease)     Cancer (HCC)     SKIN    Hyperlipidemia     LBBB (left bundle branch block) 5/31/2012    Primary insomnia 10/31/2019    Sleep apnea 2011    USE CPAP     Past Surgical History:   Procedure Laterality Date    ANKLE FRACTURE SURGERY      ANKLE SURGERY Left 10/1/2020    REMOVAL OF HARDWARE LEFT ANKLE performed by Makayla Liz MD at [place] [id number]    MOHS SURGERY Right 03/11/2020    Right Mid Vertex Scalp    MOHS SURGERY  01/27/2021    R Forehead    TOTAL ANKLE ARTHROPLASTY Left 10/1/2020    LEFT ANKLE REPLACEMENT, GASTROCNEMIUS RECESSION; performed by Makayla Liz MD at [address] [place] [address] [address]       No Known Allergies  Outpatient Medications Marked as Taking for the 2/27/23 encounter (Office Visit) with Trevor Torres MD   Medication Sig Dispense Refill    LORazepam (ATIVAN) 0.5 MG tablet Take 1 tablet by mouth every 8 hours as needed for Anxiety for up to 90 days.  270 tablet 0    zolpidem (AMBIEN CR) 6.25 MG extended release tablet Take 1 tablet by mouth nightly as needed for Sleep for up to 90 days. Max Daily Amount: 6.25 mg 90 tablet 0    tamsulosin (FLOMAX) 0.4 MG capsule TAKE 1 CAPSULE BY MOUTH  DAILY 90 capsule 3    aspirin 81 MG EC tablet Take 81 mg by mouth daily      fluorouracil (EFUDEX) 5 % cream Apply to the forehead, temples and sides of the cheeks 2 times daily for 14 days. 40 g 0    omeprazole (PRILOSEC) 40 MG delayed release capsule Take 1 capsule by mouth every morning (before breakfast) 90 capsule 1    clopidogrel (PLAVIX) 75 MG tablet TAKE 1 TABLET BY MOUTH  DAILY 90 tablet 3    rosuvastatin (CRESTOR) 20 MG tablet TAKE 1 TABLET BY MOUTH  DAILY 90 tablet 3    metFORMIN (GLUCOPHAGE-XR) 500 MG extended release tablet TAKE 1 TABLET BY MOUTH DAILY WITH BREAKFAST 90 tablet 3    triamcinolone (KENALOG) 0.1 % cream Apply to affected aresa on the right chest and right thigh twice daily for up to 2 weeks or until improved. 30 g 2    olmesartan (BENICAR) 20 MG tablet Take 1 tablet by mouth      JUBLIA 10 % SOLN APPLY TO THE RIGHT BIG TONAIL AND 4TH TOENAIL ONCE A DAY AFTER BATHING 4 mL 0    Vitamin D (CHOLECALCIFEROL) 1000 UNITS CAPS capsule Take 1,000 Units by mouth daily. Physical Examination       The following were examined and determined to be normal: Psych/Neuro, Conjunctivae/eyelids, Gums/teeth/lips, Neck, Breast/axilla/chest, Abdomen, Back, RUE, LUE, RLE, LLE, and Nails/digits. The following were examined and determined to be abnormal: Scalp/hair and Head/face. Well appearing. 1.  Frontal scalp - 4, forehead - 2, left temple - 1: ill defined keratotic pink macules. 2.  Clear. 3.  Extremities with multiple round brown macules and patches. Assessment and Plan     1. Actinic keratoses - several    Cryotherapy was discussed and patient agreed to proceed. Consent was obtained.   7 lesions were treated cryotherapy: Frontal scalp - 4, forehead - 2, left temple - 1. 2 cycles of liquid nitrogen applied to each lesion for 5 seconds using a Cry-Ac cryo spray gun. Patient was educated regarding the potential risks of blister formation and discomfort. Wound care was discussed. The patient tolerated the procedure well and there were no immediate complications. 2. Chronic dermatitis - under good control    Continue intermittent use of triamcinolone 0.1% cream twice daily as needed. 3. Solar lentiginosis     Monitor for change. Sun protective behaviors encouraged including use of at least SPF 30 or more sunscreen. Return in about 6 months (around 8/27/2023).     --Mackenzie Kitchen MD

## 2023-03-10 ENCOUNTER — TELEPHONE (OUTPATIENT)
Dept: INTERNAL MEDICINE CLINIC | Age: 78
End: 2023-03-10

## 2023-03-14 ENCOUNTER — OFFICE VISIT (OUTPATIENT)
Dept: INTERNAL MEDICINE CLINIC | Age: 78
End: 2023-03-14
Payer: MEDICARE

## 2023-03-14 VITALS — TEMPERATURE: 98.7 F | DIASTOLIC BLOOD PRESSURE: 84 MMHG | SYSTOLIC BLOOD PRESSURE: 126 MMHG

## 2023-03-14 DIAGNOSIS — E11.9 TYPE 2 DIABETES MELLITUS TREATED WITHOUT INSULIN (HCC): Primary | ICD-10-CM

## 2023-03-14 DIAGNOSIS — K14.1 GEOGRAPHIC TONGUE: ICD-10-CM

## 2023-03-14 LAB
CHP ED QC CHECK: NORMAL
GLUCOSE BLD-MCNC: 94 MG/DL

## 2023-03-14 PROCEDURE — 1123F ACP DISCUSS/DSCN MKR DOCD: CPT | Performed by: INTERNAL MEDICINE

## 2023-03-14 PROCEDURE — 99213 OFFICE O/P EST LOW 20 MIN: CPT | Performed by: INTERNAL MEDICINE

## 2023-03-14 PROCEDURE — 3044F HG A1C LEVEL LT 7.0%: CPT | Performed by: INTERNAL MEDICINE

## 2023-03-14 PROCEDURE — 82962 GLUCOSE BLOOD TEST: CPT | Performed by: INTERNAL MEDICINE

## 2023-03-14 SDOH — ECONOMIC STABILITY: FOOD INSECURITY: WITHIN THE PAST 12 MONTHS, THE FOOD YOU BOUGHT JUST DIDN'T LAST AND YOU DIDN'T HAVE MONEY TO GET MORE.: NEVER TRUE

## 2023-03-14 SDOH — ECONOMIC STABILITY: INCOME INSECURITY: HOW HARD IS IT FOR YOU TO PAY FOR THE VERY BASICS LIKE FOOD, HOUSING, MEDICAL CARE, AND HEATING?: NOT HARD AT ALL

## 2023-03-14 SDOH — ECONOMIC STABILITY: FOOD INSECURITY: WITHIN THE PAST 12 MONTHS, YOU WORRIED THAT YOUR FOOD WOULD RUN OUT BEFORE YOU GOT MONEY TO BUY MORE.: NEVER TRUE

## 2023-03-14 SDOH — ECONOMIC STABILITY: HOUSING INSECURITY
IN THE LAST 12 MONTHS, WAS THERE A TIME WHEN YOU DID NOT HAVE A STEADY PLACE TO SLEEP OR SLEPT IN A SHELTER (INCLUDING NOW)?: NO

## 2023-03-14 ASSESSMENT — PATIENT HEALTH QUESTIONNAIRE - PHQ9
2. FEELING DOWN, DEPRESSED OR HOPELESS: 0
1. LITTLE INTEREST OR PLEASURE IN DOING THINGS: 0
SUM OF ALL RESPONSES TO PHQ QUESTIONS 1-9: 0
SUM OF ALL RESPONSES TO PHQ9 QUESTIONS 1 & 2: 0
SUM OF ALL RESPONSES TO PHQ QUESTIONS 1-9: 0

## 2023-03-14 NOTE — PROGRESS NOTES
CHIEF COMPLAINT: Sylvia Velasquez is a 78 y.o. male who presents for : Lesion of the tongue    HPI: Patient presented with lesions on the tongue he notes that it is discolored he has never noticed this before he is totally asymptomatic    Review of Systems:   Constitutional:  Denies fever or chills   Eyes:  Denies change in visual acuity   HENT:  Denies nasal congestion or sore throat   Respiratory:  Denies cough or shortness of breath   Cardiovascular:  Denies chest pain or edema   GI:  Denies abdominal pain, nausea, vomiting, bloody stools or diarrhea   :  Denies dysuria   Musculoskeletal:  Denies back pain or joint pain   Integument:  Denies rash   Neurologic:  Denies headache, focal weakness or sensory changes   Endocrine:  Denies polyuria or polydipsia   Lymphatic:  Denies swollen glands   Psychiatric:  Denies depression or anxiety     Past Medical History:        Diagnosis Date    Anxiety     BPH (benign prostatic hyperplasia) 5/31/2012    CAD (coronary artery disease)     Cancer ([facility] [address]     SKIN    Hyperlipidemia     LBBB (left bundle branch block) 5/31/2012    Primary insomnia 10/31/2019    Sleep apnea 2011    USE CPAP       Past Surgical History:        Procedure Laterality Date    ANKLE FRACTURE SURGERY      ANKLE SURGERY Left 10/1/2020    REMOVAL OF HARDWARE LEFT ANKLE performed by Irineo Lua MD at [facility] [place]  2006    MOHS SURGERY Right 03/11/2020    Right Mid Vertex Scalp    MOHS SURGERY  01/27/2021    R Forehead    TOTAL ANKLE ARTHROPLASTY Left 10/1/2020    LEFT ANKLE REPLACEMENT, GASTROCNEMIUS RECESSION; performed by Irineo Lua MD at [facility] [address]       Family History:  Family History   Problem Relation Age of Onset    Cancer Mother     Heart Disease Father     Thyroid Disease Brother        Social History:  Social History     Socioeconomic History    Marital status:    Tobacco Use    Smoking status: Never    Smokeless tobacco: Never   Vaping Use Vaping Use: Never used   Substance and Sexual Activity    Alcohol use: Yes     Alcohol/week: 1.7 standard drinks     Types: 2 Standard drinks or equivalent per week     Comment: OCC    Drug use: Never     Social Determinants of Health     Financial Resource Strain: Low Risk     Difficulty of Paying Living Expenses: Not hard at all   Food Insecurity: No Food Insecurity    Worried About Running Out of Food in the Last Year: Never true    Ran Out of Food in the Last Year: Never true   Transportation Needs: Unknown    Lack of Transportation (Non-Medical): No   Housing Stability: Unknown    Unstable Housing in the Last Year: No         Allergies:  Patient has no known allergies. Current Medications:    Prior to Admission medications    Medication Sig Start Date End Date Taking? Authorizing Provider   LORazepam (ATIVAN) 0.5 MG tablet Take 1 tablet by mouth every 8 hours as needed for Anxiety for up to 90 days. 1/10/23 4/10/23 Yes Sujatha Nunn MD   zolpidem (AMBIEN CR) 6.25 MG extended release tablet Take 1 tablet by mouth nightly as needed for Sleep for up to 90 days. Max Daily Amount: 6.25 mg 1/10/23 4/10/23 Yes Sujatha Nunn MD   tamsulosin [facility]) 0.4 MG capsule TAKE 1 CAPSULE BY MOUTH  DAILY 11/9/22  Yes Sujatha Nunn MD   aspirin 81 MG EC tablet Take 81 mg by mouth daily   Yes Historical Provider, MD   fluorouracil (EFUDEX) 5 % cream Apply to the forehead, temples and sides of the cheeks 2 times daily for 14 days.  8/23/22  Yes Delia Harley MD   omeprazole (PRILOSEC) 40 MG delayed release capsule Take 1 capsule by mouth every morning (before breakfast) 8/9/22  Yes Sujatha Nunn MD   clopidogrel (PLAVIX) 75 MG tablet TAKE 1 TABLET BY MOUTH  DAILY 6/10/22  Yes Sujatha Nunn MD   rosuvastatin (CRESTOR) 20 MG tablet TAKE 1 TABLET BY MOUTH  DAILY 6/10/22  Yes Sujatha Nunn MD   metFORMIN (GLUCOPHAGE-XR) 500 MG extended release tablet TAKE 1 TABLET BY MOUTH DAILY WITH BREAKFAST 4/19/22  Yes Sujatha Nunn MD triamcinolone (KENALOG) 0.1 % cream Apply to affected aresa on the right chest and right thigh twice daily for up to 2 weeks or until improved. 11/22/21  Yes Tamiko Amaro MD   olmesartan (BENICAR) 20 MG tablet Take 1 tablet by mouth 9/23/20  Yes Historical Provider, MD   JUBLIA 10 % SOLN APPLY TO THE RIGHT BIG TONAIL AND 4TH TOENAIL ONCE A DAY AFTER BATHING 8/16/18  Yes Tamiko Amaro MD   Vitamin D (CHOLECALCIFEROL) 1000 UNITS CAPS capsule Take 1,000 Units by mouth daily. Yes Historical Provider, MD       Physical Exam:  Vital Signs: /84   Temp 98.7 °F (37.1 °C)   General: Patient appears  non-toxic  HENT: Atraumatic, normocephalic, oral mucosa moist exam is consistent with geographic tongue  Lungs:  Clear bilaterally  Heart: Regular rate and rhythm  Abdomen: Non-distended, soft, non-tender  Extremities: No edema  Neuro: Nonfocal    Medical Decision Making and Plan:  Pertinent Labs & Imaging studies reviewed. (See chart for details)  Blood sugar is 119    1.  Type 2 diabetes mellitus treated without insulin ([facility] [address]  This problem is stable  - POCT Glucose  Graphic tongue brush tongue reassurance call if does not resolve

## 2023-04-11 DIAGNOSIS — I25.10 CORONARY ARTERY DISEASE INVOLVING NATIVE CORONARY ARTERY OF NATIVE HEART WITHOUT ANGINA PECTORIS: ICD-10-CM

## 2023-04-11 DIAGNOSIS — N40.1 BENIGN PROSTATIC HYPERPLASIA WITH URINARY FREQUENCY: ICD-10-CM

## 2023-04-11 DIAGNOSIS — Z01.818 PREOP EXAM FOR INTERNAL MEDICINE: ICD-10-CM

## 2023-04-11 DIAGNOSIS — R35.0 BENIGN PROSTATIC HYPERPLASIA WITH URINARY FREQUENCY: ICD-10-CM

## 2023-04-11 LAB
BASOPHILS # BLD: 0 K/UL (ref 0–0.2)
BASOPHILS NFR BLD: 0.5 %
DEPRECATED RDW RBC AUTO: 13.3 % (ref 12.4–15.4)
EOSINOPHIL # BLD: 0.1 K/UL (ref 0–0.6)
EOSINOPHIL NFR BLD: 0.9 %
HCT VFR BLD AUTO: 40.7 % (ref 40.5–52.5)
HGB BLD-MCNC: 13.7 G/DL (ref 13.5–17.5)
LYMPHOCYTES # BLD: 1.5 K/UL (ref 1–5.1)
LYMPHOCYTES NFR BLD: 17.4 %
MCH RBC QN AUTO: 30.8 PG (ref 26–34)
MCHC RBC AUTO-ENTMCNC: 33.8 G/DL (ref 31–36)
MCV RBC AUTO: 91.1 FL (ref 80–100)
MONOCYTES # BLD: 0.7 K/UL (ref 0–1.3)
MONOCYTES NFR BLD: 7.6 %
NEUTROPHILS # BLD: 6.4 K/UL (ref 1.7–7.7)
NEUTROPHILS NFR BLD: 73.6 %
PLATELET # BLD AUTO: 218 K/UL (ref 135–450)
PMV BLD AUTO: 8.6 FL (ref 5–10.5)
RBC # BLD AUTO: 4.46 M/UL (ref 4.2–5.9)
WBC # BLD AUTO: 8.7 K/UL (ref 4–11)

## 2023-04-12 LAB
ALBUMIN SERPL-MCNC: 4.5 G/DL (ref 3.4–5)
ALBUMIN/GLOB SERPL: 1.7 {RATIO} (ref 1.1–2.2)
ALP SERPL-CCNC: 57 U/L (ref 40–129)
ALT SERPL-CCNC: 14 U/L (ref 10–40)
ANION GAP SERPL CALCULATED.3IONS-SCNC: 15 MMOL/L (ref 3–16)
AST SERPL-CCNC: 17 U/L (ref 15–37)
BILIRUB SERPL-MCNC: 0.3 MG/DL (ref 0–1)
BUN SERPL-MCNC: 18 MG/DL (ref 7–20)
CALCIUM SERPL-MCNC: 9.4 MG/DL (ref 8.3–10.6)
CHLORIDE SERPL-SCNC: 103 MMOL/L (ref 99–110)
CO2 SERPL-SCNC: 26 MMOL/L (ref 21–32)
CREAT SERPL-MCNC: 1.4 MG/DL (ref 0.8–1.3)
GFR SERPLBLD CREATININE-BSD FMLA CKD-EPI: 52 ML/MIN/{1.73_M2}
GLUCOSE SERPL-MCNC: 130 MG/DL (ref 70–99)
POTASSIUM SERPL-SCNC: 4 MMOL/L (ref 3.5–5.1)
PROT SERPL-MCNC: 7.2 G/DL (ref 6.4–8.2)
SODIUM SERPL-SCNC: 144 MMOL/L (ref 136–145)

## 2023-04-21 ENCOUNTER — OFFICE VISIT (OUTPATIENT)
Dept: INTERNAL MEDICINE CLINIC | Age: 78
End: 2023-04-21
Payer: MEDICARE

## 2023-04-21 VITALS — DIASTOLIC BLOOD PRESSURE: 82 MMHG | HEART RATE: 94 BPM | TEMPERATURE: 98.3 F | SYSTOLIC BLOOD PRESSURE: 142 MMHG

## 2023-04-21 DIAGNOSIS — T14.8XXA HEMATOMA: ICD-10-CM

## 2023-04-21 DIAGNOSIS — R79.89 ELEVATED SERUM CREATININE: ICD-10-CM

## 2023-04-21 DIAGNOSIS — R79.89 ELEVATED SERUM CREATININE: Primary | ICD-10-CM

## 2023-04-21 PROCEDURE — 1123F ACP DISCUSS/DSCN MKR DOCD: CPT | Performed by: INTERNAL MEDICINE

## 2023-04-21 PROCEDURE — 99213 OFFICE O/P EST LOW 20 MIN: CPT | Performed by: INTERNAL MEDICINE

## 2023-04-21 ASSESSMENT — ENCOUNTER SYMPTOMS
GASTROINTESTINAL NEGATIVE: 1
RESPIRATORY NEGATIVE: 1
ALLERGIC/IMMUNOLOGIC NEGATIVE: 1

## 2023-04-21 NOTE — PROGRESS NOTES
Maknena Hendrix (:  1945) is a 78 y.o. male,Established patient, here for evaluation of the following chief complaint(s):  Hematoma         ASSESSMENT/PLAN:  1. Elevated serum creatinine  On lab work done on , Cr was elevated from 1.0 () to 1.4 and eGFR was decreased from >60 () to 52. BUN was 124 on  and 130 on . This elevation in Cr and decreased in eGFR is likely due to lab error. We will repeat blood work today to make sure. -     Renal Function Panel; Future  2. Hematoma  Based on patient's medications - on aspirin and plavix, history of present illness - doing yard work, having an oak tree branch strike him, and reassuring physical exam - no bicep weakness or pain, he likely has a hematoma. He should ice the injury as it has been < 24 hrs and then apply heat as tolerated for faster resolution of the bruise. -     Renal Function Panel; Future  - because it has been < 24 hrs since injury, ice the bruise for 20min every 2-3 hours for 24 hours and then apply heat for faster resolution on the bruise      No follow-ups on file. Subjective   SUBJECTIVE/OBJECTIVE:  HPI  Mr. Tony Arnold is a 78year old male with a history of CAD and stent for which he is on aspirin MWF and plavix 75mg QID presenting today for a large hematoma on his left distal bicep and for a discussion of his lab results from 23. Yesterday, Mr. Tony Arnold was doing yard work involving lots of physical activity and heavy lifting, including of a large slab of stone. Of note, he was using a pole saw to cut down dead tree branches from his oak trees. While this was going on, one branch fell from the tree and hit him somewhere but he is unable to recall exactly where as at the same time, he received a phone call from his wife whose mother was being transported to the ED. He does not know where the branch hit him exactly and does not recall any significant pain.  Because of the high stress situation he was in last

## 2023-04-22 LAB
ALBUMIN SERPL-MCNC: 4.6 G/DL (ref 3.4–5)
ANION GAP SERPL CALCULATED.3IONS-SCNC: 13 MMOL/L (ref 3–16)
BUN SERPL-MCNC: 18 MG/DL (ref 7–20)
CALCIUM SERPL-MCNC: 9.7 MG/DL (ref 8.3–10.6)
CHLORIDE SERPL-SCNC: 104 MMOL/L (ref 99–110)
CO2 SERPL-SCNC: 24 MMOL/L (ref 21–32)
CREAT SERPL-MCNC: 1.3 MG/DL (ref 0.8–1.3)
GFR SERPLBLD CREATININE-BSD FMLA CKD-EPI: 57 ML/MIN/{1.73_M2}
GLUCOSE SERPL-MCNC: 101 MG/DL (ref 70–99)
PHOSPHATE SERPL-MCNC: 4 MG/DL (ref 2.5–4.9)
POTASSIUM SERPL-SCNC: 4.4 MMOL/L (ref 3.5–5.1)
SODIUM SERPL-SCNC: 141 MMOL/L (ref 136–145)

## 2023-05-12 DIAGNOSIS — E78.5 HYPERLIPIDEMIA, UNSPECIFIED HYPERLIPIDEMIA TYPE: ICD-10-CM

## 2023-05-12 DIAGNOSIS — I25.10 CAD (CORONARY ARTERY DISEASE): ICD-10-CM

## 2023-05-12 DIAGNOSIS — R55 SYNCOPE, NEAR: ICD-10-CM

## 2023-05-12 DIAGNOSIS — E78.5 HYPERLIPEMIA: ICD-10-CM

## 2023-05-12 DIAGNOSIS — I45.10 RBBB (RIGHT BUNDLE BRANCH BLOCK): ICD-10-CM

## 2023-05-12 DIAGNOSIS — Z95.5 S/P CORONARY ARTERY STENT PLACEMENT: ICD-10-CM

## 2023-05-15 RX ORDER — CLOPIDOGREL BISULFATE 75 MG/1
75 TABLET ORAL DAILY
Qty: 90 TABLET | Refills: 3 | Status: SHIPPED | OUTPATIENT
Start: 2023-05-15

## 2023-05-15 RX ORDER — ROSUVASTATIN CALCIUM 20 MG/1
20 TABLET, COATED ORAL DAILY
Qty: 90 TABLET | Refills: 3 | Status: SHIPPED | OUTPATIENT
Start: 2023-05-15

## 2023-05-18 LAB — PATHOLOGY/CYTOLOGY REPORT: NORMAL

## 2023-06-30 ENCOUNTER — TELEPHONE (OUTPATIENT)
Dept: INTERNAL MEDICINE CLINIC | Age: 78
End: 2023-06-30

## 2023-06-30 DIAGNOSIS — E11.9 TYPE 2 DIABETES MELLITUS TREATED WITHOUT INSULIN (HCC): Primary | ICD-10-CM

## 2023-07-20 DIAGNOSIS — F51.01 PRIMARY INSOMNIA: ICD-10-CM

## 2023-07-20 RX ORDER — LORAZEPAM 0.5 MG/1
0.5 TABLET ORAL EVERY 8 HOURS PRN
Qty: 270 TABLET | Refills: 0 | Status: SHIPPED | OUTPATIENT
Start: 2023-07-20 | End: 2023-10-18

## 2023-07-20 NOTE — TELEPHONE ENCOUNTER
Patient needs refill:    LORazepam (ATIVAN) 0.5 MG tablet    [id number] - [address] [place] [phone]    Pls advise.

## 2023-07-27 ENCOUNTER — OFFICE VISIT (OUTPATIENT)
Dept: INTERNAL MEDICINE CLINIC | Age: 78
End: 2023-07-27

## 2023-07-27 VITALS
BODY MASS INDEX: 28.41 KG/M2 | SYSTOLIC BLOOD PRESSURE: 128 MMHG | WEIGHT: 181 LBS | DIASTOLIC BLOOD PRESSURE: 86 MMHG | HEIGHT: 67 IN

## 2023-07-27 DIAGNOSIS — E11.9 TYPE 2 DIABETES MELLITUS TREATED WITHOUT INSULIN (HCC): ICD-10-CM

## 2023-07-27 DIAGNOSIS — R39.14 BENIGN PROSTATIC HYPERPLASIA WITH INCOMPLETE BLADDER EMPTYING: ICD-10-CM

## 2023-07-27 DIAGNOSIS — I25.119 CORONARY ARTERY DISEASE INVOLVING NATIVE CORONARY ARTERY WITH ANGINA PECTORIS, UNSPECIFIED WHETHER NATIVE OR TRANSPLANTED HEART (HCC): ICD-10-CM

## 2023-07-27 DIAGNOSIS — Z00.00 PE (PHYSICAL EXAM), ANNUAL: Primary | ICD-10-CM

## 2023-07-27 DIAGNOSIS — N40.1 BENIGN PROSTATIC HYPERPLASIA WITH INCOMPLETE BLADDER EMPTYING: ICD-10-CM

## 2023-07-27 DIAGNOSIS — Z95.5 S/P CORONARY ARTERY STENT PLACEMENT: ICD-10-CM

## 2023-07-27 DIAGNOSIS — E55.9 VITAMIN D DEFICIENCY: ICD-10-CM

## 2023-07-27 DIAGNOSIS — E78.00 PURE HYPERCHOLESTEROLEMIA: ICD-10-CM

## 2023-07-27 DIAGNOSIS — F51.01 PRIMARY INSOMNIA: ICD-10-CM

## 2023-07-27 DIAGNOSIS — G47.33 OBSTRUCTIVE SLEEP APNEA SYNDROME: ICD-10-CM

## 2023-07-27 DIAGNOSIS — Z12.5 SCREENING PSA (PROSTATE SPECIFIC ANTIGEN): ICD-10-CM

## 2023-07-27 ASSESSMENT — PATIENT HEALTH QUESTIONNAIRE - PHQ9
SUM OF ALL RESPONSES TO PHQ QUESTIONS 1-9: 0
SUM OF ALL RESPONSES TO PHQ QUESTIONS 1-9: 0
SUM OF ALL RESPONSES TO PHQ9 QUESTIONS 1 & 2: 0
SUM OF ALL RESPONSES TO PHQ QUESTIONS 1-9: 0
2. FEELING DOWN, DEPRESSED OR HOPELESS: 0
1. LITTLE INTEREST OR PLEASURE IN DOING THINGS: 0
SUM OF ALL RESPONSES TO PHQ QUESTIONS 1-9: 0

## 2023-07-27 ASSESSMENT — LIFESTYLE VARIABLES
HOW OFTEN DO YOU HAVE A DRINK CONTAINING ALCOHOL: 2-4 TIMES A MONTH
HOW MANY STANDARD DRINKS CONTAINING ALCOHOL DO YOU HAVE ON A TYPICAL DAY: 1 OR 2

## 2023-07-27 NOTE — PROGRESS NOTES
Annual Wellness Visit     Patient:  Jose J Schilling                                               : 1945  Age: 78 y.o. MRN: [id number]  Date : 2023      CHIEF COMPLAINT: Jose J Schilling is a 78 y.o. male who presents for : Physical exam    1. Type 2 diabetes mellitus treated without insulin ([facility])  Blood sugars been well controlled no polyuria polydipsia  - CBC with Auto Differential; Future  - Comprehensive Metabolic Panel; Future  - Lipid Panel; Future  - PSA Screening; Future  - TSH; Future  - Urinalysis; Future  - Vitamin D 25 Hydroxy; Future  - Hemoglobin A1C; Future  - External Referral To Sleep Medicine    2. Benign prostatic hyperplasia with incomplete bladder emptying  This problem stable gets up maybe once or twice a night    3. Obstructive sleep apnea syndrome  Intolerant to CPAP we will consider inspire ease we will send a sleep  - External Referral To Sleep Medicine    4. Coronary artery disease involving native coronary artery with angina pectoris, unspecified whether native or transplanted heart [facility])  This problem is stable follow cardiology no cardiac symptoms no chest pain shortness of breath or any other problems    5. Pure hypercholesterolemia    - CBC with Auto Differential; Future  - Comprehensive Metabolic Panel; Future  - Lipid Panel; Future  - PSA Screening; Future  - TSH; Future  - Urinalysis; Future  - Vitamin D 25 Hydroxy; Future  - Hemoglobin A1C; Future  - External Referral To Sleep Medicine    6. S/P coronary artery stent placement  This problem is stable followed by cardiology  - CBC with Auto Differential; Future  - Comprehensive Metabolic Panel; Future  - Lipid Panel; Future  - PSA Screening; Future  - TSH; Future  - Urinalysis; Future  - Vitamin D 25 Hydroxy; Future  - Hemoglobin A1C; Future  - External Referral To Sleep Medicine    7. Vitamin D deficiency    - Vitamin D 25 Hydroxy; Future    8.  PE (physical exam), annual  Generally feels good denies any chest pain
days. Yes Mely Haro MD   metFORMIN (GLUCOPHAGE-XR) 500 MG extended release tablet TAKE 1 TABLET BY MOUTH DAILY WITH BREAKFAST Yes Mely Haro MD   rosuvastatin (CRESTOR) 20 MG tablet TAKE 1 TABLET BY MOUTH  DAILY Yes Mely Haro MD   clopidogrel (PLAVIX) 75 MG tablet TAKE 1 TABLET BY MOUTH  DAILY Yes Mely Haro MD   tamsulosin (FLOMAX) 0.4 MG capsule TAKE 1 CAPSULE BY MOUTH  DAILY Yes Mely Haro MD   aspirin 81 MG EC tablet Take 1 tablet by mouth daily Yes Historical Provider, MD   fluorouracil (EFUDEX) 5 % cream Apply to the forehead, temples and sides of the cheeks 2 times daily for 14 days. Yes Juan M Ji MD   omeprazole (PRILOSEC) 40 MG delayed release capsule Take 1 capsule by mouth every morning (before breakfast) Yes Mely Haro MD   triamcinolone (KENALOG) 0.1 % cream Apply to affected aresa on the right chest and right thigh twice daily for up to 2 weeks or until improved.  Yes Juan M Ji MD   olmesartan (BENICAR) 20 MG tablet Take 1 tablet by mouth Yes Historical Provider, MD   JUBLIA 10 % SOLN APPLY TO THE RIGHT BIG TONAIL AND 4TH TOENAIL ONCE A DAY AFTER BATHING Yes Juan M Ji MD   Vitamin D (CHOLECALCIFEROL) 1000 UNITS CAPS capsule Take 1 capsule by mouth daily Yes Historical Provider, MD Garcia (Including outside providers/suppliers regularly involved in providing care):   Patient Care Team:  Mely Haro MD as PCP - General (Internal Medicine)  Mely Haro MD as PCP - Empaneled Provider     Reviewed and updated this visit:  Tobacco  Allergies  Meds  Med Hx  Surg Hx  Soc Hx  Fam Hx

## 2023-08-10 ENCOUNTER — OFFICE VISIT (OUTPATIENT)
Dept: ENDOCRINOLOGY | Age: 78
End: 2023-08-10

## 2023-08-10 DIAGNOSIS — E11.9 TYPE 2 DIABETES MELLITUS TREATED WITHOUT INSULIN (HCC): Primary | ICD-10-CM

## 2023-08-10 RX ORDER — TAMSULOSIN HYDROCHLORIDE 0.4 MG/1
CAPSULE ORAL
Qty: 100 CAPSULE | Refills: 2 | Status: SHIPPED | OUTPATIENT
Start: 2023-08-10

## 2023-09-01 ENCOUNTER — TELEPHONE (OUTPATIENT)
Dept: DERMATOLOGY | Age: 78
End: 2023-09-01

## 2023-09-01 NOTE — TELEPHONE ENCOUNTER
Pt  requesting a appt totally thought it was a today not yesterday wanted to get a appt if possible  Really needs to be seen spots on head  Please advise   Thanks   C/b [phone]

## 2023-09-05 ENCOUNTER — OFFICE VISIT (OUTPATIENT)
Dept: DERMATOLOGY | Age: 78
End: 2023-09-05

## 2023-09-05 DIAGNOSIS — C44.612 BASAL CELL CARCINOMA (BCC) OF RIGHT UPPER ARM: ICD-10-CM

## 2023-09-05 DIAGNOSIS — L57.0 ACTINIC KERATOSIS: Primary | ICD-10-CM

## 2023-09-05 NOTE — PROGRESS NOTES
[facility] Dermatology  Meghan Valle MD  [phone]      Yvonne Chambers  1945    78 y.o. male     Date of Visit: 9/5/2023    Chief Complaint: skin lesions    History of Present Illness:    1. He returns today for AKs - treated with Efudex and cryotherapy in the past.  Reports scaly lesions on the scalp and face. 2.  Unknown duration of an asymptomatic pink lesion on the right upper arm. Derm Hx:   Nodular BCC on the left upper nasolabial fold-treated with Mohs by Dr. Tesha Dan on 9/6/2022. Superficial BCC on the right lower shin - treated with curettage on 1/21/22. Superficial basal cell carcinoma of the left upper chest-treated with curettage on 3/5/2021. Nodular BCC left anterior shoulder-treated with electrodesiccation and curettage on 3/5/2021. Metatypical BCC on the right forehead-treated with Mohs by Dr. Tesha Dan on 1/27/2021. Small nodular BCC on the right superior shoulder-treated with curettage at the time of biopsy on 11/17/2020. Nodular BCC of the right mid vertex scalp-treated with Mohs by Dr. Tesha Dan on 3/11/2020. Small nodular BCCs on the right anterior upper arm and right mid arm-treated with curettage at the time of biopsy on 2/26/2019. Nodular BCC on the left upper chest history with curettage at the time of biopsy in October 2018. Superficial BCC of the right supraclavicular region-treated with curettage at time of biopsy on 1/3/2018. Nodular BCC on the right thigh - excised on 6/23/17. Superficial BCC on the right central upper back - treated with curettage on 3/28/17. Nodular BCC on the left central lower back - treated with curettage on 3/28/17. Nodular BCC on the right mid thigh-treated with curettage on 10/7/2016. Superficial basal cell carcinoma left upper chest-treated with curettage on 9/21/2016. Nodular BCC of the right mid central back-treated with curettage on 9/21/2016.   Superficial BCC of the left central lower back-treated

## 2023-09-05 NOTE — PATIENT INSTRUCTIONS
Biopsy Wound Care Instructions    Keep the bandage in place for 24 hours. Cleanse the wound with mild soapy water daily  Gently dry the area. Apply Vaseline or petroleum jelly to the wound using a cotton tipped applicator. Cover with a clean bandage. Repeat this process until the biopsy site is healed. If you had stitches placed, continue treating the site until the stitches are removed. Remember to make an appointment to return to have your stitches removed by our staff. You may shower and bathe as usual.       ** Biopsy results generally take around 7 business days to come back. If you have not heard from us by then, please call the office at [phone]. *Please note that biopsy results are released to both the patient and physician at the same time in [address] [place] [address]. Please allow time for your physician to review the results. One of our staff members will reach out to you with the results and plan.

## 2023-09-08 LAB — DERMATOLOGY PATHOLOGY REPORT: ABNORMAL

## 2023-09-11 ENCOUNTER — OFFICE VISIT (OUTPATIENT)
Dept: ENDOCRINOLOGY | Age: 78
End: 2023-09-11
Payer: MEDICARE

## 2023-09-11 DIAGNOSIS — E11.9 TYPE 2 DIABETES MELLITUS TREATED WITHOUT INSULIN (HCC): Primary | ICD-10-CM

## 2023-09-11 PROCEDURE — 3044F HG A1C LEVEL LT 7.0%: CPT

## 2023-09-11 PROCEDURE — 97803 MED NUTRITION INDIV SUBSEQ: CPT

## 2023-09-11 NOTE — PROGRESS NOTES
Medical Nutrition Therapy for Diabetes  [facility]) Endocrinology    Aldon Lanes  September 11, 2023    Patient Care Team:  James Paez MD as PCP - General (Internal Medicine)  James Paez MD as PCP - Empaneled Provider    Reason for visit: 1. Type 2 diabetes mellitus treated without insulin (HCC)     Follow up    ASSESSMENT/PLAN:   NUTRITION DIAGNOSIS    #1 Problem: Altered Nutrition-Related Laboratory Values (NC-2.2)  Related to: Endocrine/Diabetes   As Evidenced by: Elevated Plasma glucose and/or HgbA1c levels         #2 Problem: Inconsistent Carbohydrate Intake (NI 5.8.4)  Related to: Varied meal timing / incorrect carbohydrate counting  As Evidenced by: fluctuation in blood glucose levels / food recall    #3 Problem: Knowledge and Beliefs-NB-3.1                       Food and nutrition deficits    NUTRITION INTERVENTION  Nutrition Prescription: 45 grams carbohydrate per meal with protein and non-starch vegetables  15 gram carbohydrate snacks     Goal: pair proteins with CHO at all meals    Diabetes Education/Counseling included:  Reviewed Glucometer use and pamphlet provided with BG log. Reviewed use of CGM vs glucometer  Carbohydrate control  Activity/Exercise  Label reading  Monitoring  Explained small efforts can promote improved health results  Benefit of eating protein with each meal/snack reviewed  Explained HgbA1c ranges, reviewed normal/at risk for diabetes/and diabetes diagnosis ranges.       Handouts Provided:  Checking Your Blood Glucose- [address] [place] [address] Diabetes and Education Handouts- [place] [facility]  Planning Healthy Meals- [employer]    Interventions:  Control Carbohydrate Intake using Carb Counting  Increase intake of whole grains  Increase activity level by walking   Reviewed reference range for blood gluose levels at testing times         NUTRITION MONITORING AND EVALUATION    3 Meal per day, try to not skip meals  Increase water intake  Take Medications per order  Encouraged

## 2023-09-18 ENCOUNTER — TELEPHONE (OUTPATIENT)
Dept: INTERNAL MEDICINE CLINIC | Age: 78
End: 2023-09-18

## 2023-09-18 ENCOUNTER — TELEPHONE (OUTPATIENT)
Dept: PHARMACY | Facility: CLINIC | Age: 78
End: 2023-09-18

## 2023-09-18 NOTE — TELEPHONE ENCOUNTER
[facility] CLINICAL PHARMACY: ADHERENCE REVIEW  Identified care gap per [employer]: fills at [facility] and [facility]: Diabetes adherence    Patient also appears to be prescribed: Statin    ASSESSMENT  DIABETES ADHERENCE    Insurance Records claims through 09/10/2023 (Prior Year [address] = not reported; YTD [address] = 74%; Potential Fail Date: 9/20/23): Metformin  mg tablets last filled on 6/13/23 for 90 day supply. Next refill due: 9/11/23    Per chart review, should have refills remaining at [address]. Lab Results   Component Value Date    LABA1C 6.7 01/26/2023    LABA1C 6.6 02/09/2022    LABA1C 6.5 08/03/2021     NOTE: A1c <9%    STATIN ADHERENCE    Rosuvastatin 20 mg reviewed. Not due to fill at this time. Passed adherence measure 2023      Lab Results   Component Value Date    CHOL 143 02/09/2022    TRIG 91 02/09/2022    HDL 59 02/09/2022    LDLCALC 66 02/09/2022     ALT   Date Value Ref Range Status   04/11/2023 14 10 - 40 U/L Final     AST   Date Value Ref Range Status   04/11/2023 17 15 - 37 U/L Final     The 10-year ASCVD risk score (Pop DK, et al., 2019) is: 36.9%    Values used to calculate the score:      Age: 78 years      Sex: Male      Is Non- : No      Diabetic: Yes      Tobacco smoker: No      Systolic Blood Pressure: 520 mmHg      Is BP treated: No      HDL Cholesterol: 59 mg/dL      Total Cholesterol: 143 mg/dL     PLAN  The following are interventions that have been identified:   Patient overdue refilling metformin and active on home medication list.     Reached patient to review. He confirms taking metformin. States he has just run out and needing refilled. [facility] and he agrees. [facility]. No further outreach planned at this time.     Last Visit: 7/27/23  Next Visit: 10/30/23    Future Appointments   Date Time Provider 4600  46[facility]   10/30/2023  1:45 PM Esau Arreaga MD [facility] 111

## 2023-09-18 NOTE — TELEPHONE ENCOUNTER
Patient has a lot of mucus and sore throat. This has been going on for 5 days now. Patient has a covid test coming up Would like to know if there is any thing that he can take for this. Pls call and advise.

## 2023-09-26 ENCOUNTER — OFFICE VISIT (OUTPATIENT)
Dept: FAMILY MEDICINE CLINIC | Age: 78
End: 2023-09-26

## 2023-09-26 VITALS
HEIGHT: 67 IN | HEART RATE: 88 BPM | BODY MASS INDEX: 28.25 KG/M2 | OXYGEN SATURATION: 95 % | SYSTOLIC BLOOD PRESSURE: 138 MMHG | WEIGHT: 180 LBS | DIASTOLIC BLOOD PRESSURE: 86 MMHG | TEMPERATURE: 98.2 F

## 2023-09-26 DIAGNOSIS — B96.89 ACUTE BACTERIAL SINUSITIS: ICD-10-CM

## 2023-09-26 DIAGNOSIS — J01.90 ACUTE BACTERIAL SINUSITIS: ICD-10-CM

## 2023-09-26 RX ORDER — AMOXICILLIN AND CLAVULANATE POTASSIUM 875; 125 MG/1; MG/1
1 TABLET, FILM COATED ORAL 2 TIMES DAILY
Qty: 20 TABLET | Refills: 0 | Status: SHIPPED | OUTPATIENT
Start: 2023-09-26 | End: 2023-10-06

## 2023-09-26 ASSESSMENT — ENCOUNTER SYMPTOMS
SHORTNESS OF BREATH: 0
SINUS PRESSURE: 1
COUGH: 0
RHINORRHEA: 1
SINUS COMPLAINT: 1
WHEEZING: 0
SINUS PAIN: 1
NAUSEA: 0
SORE THROAT: 1
DIARRHEA: 0
VOMITING: 0

## 2023-09-26 NOTE — PROGRESS NOTES
Rahat Rubio (:  1945) is a 78 y.o. male,Established patient, here for evaluation of the following chief complaint(s):  Congestion (Nasal. Has been present for ~10 days)      ASSESSMENT/PLAN:  1. Acute bacterial sinusitis  Assessment & Plan:  Ongoing x10 days  Waxing and waning  We will treat with Augmentin  Call if no better      No follow-ups on file. SUBJECTIVE/OBJECTIVE:  Sinus Problem  This is a new problem. The current episode started 1 to 4 weeks ago (10 days ago). The problem has been waxing and waning since onset. There has been no fever. The pain is moderate. Associated symptoms include congestion, headaches, sinus pressure and a sore throat. Pertinent negatives include no chills, coughing, ear pain, shortness of breath or sneezing. Treatments tried: mucinex, dayquil. The treatment provided no relief. Current Outpatient Medications   Medication Sig Dispense Refill    amoxicillin-clavulanate (AUGMENTIN) 875-125 MG per tablet Take 1 tablet by mouth 2 times daily for 10 days 20 tablet 0    tamsulosin (FLOMAX) 0.4 MG capsule TAKE 1 CAPSULE BY MOUTH DAILY 100 capsule 2    LORazepam (ATIVAN) 0.5 MG tablet Take 1 tablet by mouth every 8 hours as needed for Anxiety for up to 90 days. 270 tablet 0    metFORMIN (GLUCOPHAGE-XR) 500 MG extended release tablet TAKE 1 TABLET BY MOUTH DAILY WITH BREAKFAST 90 tablet 3    rosuvastatin (CRESTOR) 20 MG tablet TAKE 1 TABLET BY MOUTH  DAILY 90 tablet 3    clopidogrel (PLAVIX) 75 MG tablet TAKE 1 TABLET BY MOUTH  DAILY 90 tablet 3    aspirin 81 MG EC tablet Take 1 tablet by mouth three times a week      fluorouracil (EFUDEX) 5 % cream Apply to the forehead, temples and sides of the cheeks 2 times daily for 14 days.  40 g 0    omeprazole (PRILOSEC) 40 MG delayed release capsule Take 1 capsule by mouth every morning (before breakfast) 90 capsule 1    triamcinolone (KENALOG) 0.1 % cream Apply to affected aresa on the right chest and right thigh twice daily for

## 2023-10-23 DIAGNOSIS — F51.01 PRIMARY INSOMNIA: ICD-10-CM

## 2023-10-23 NOTE — TELEPHONE ENCOUNTER
Patient needs refill:    LORazepam (ATIVAN) 0.5 MG tablet    [facility] #[id number] - [address] Peggy BROUSSARD [phone]    Pls advise.

## 2023-10-24 RX ORDER — LORAZEPAM 0.5 MG/1
0.5 TABLET ORAL EVERY 8 HOURS PRN
Qty: 270 TABLET | Refills: 0 | Status: SHIPPED | OUTPATIENT
Start: 2023-10-24 | End: 2024-01-22

## 2023-10-30 ENCOUNTER — OFFICE VISIT (OUTPATIENT)
Dept: INTERNAL MEDICINE CLINIC | Age: 78
End: 2023-10-30

## 2023-10-30 VITALS — OXYGEN SATURATION: 98 % | SYSTOLIC BLOOD PRESSURE: 139 MMHG | DIASTOLIC BLOOD PRESSURE: 84 MMHG | HEART RATE: 67 BPM

## 2023-10-30 DIAGNOSIS — F51.01 PRIMARY INSOMNIA: ICD-10-CM

## 2023-10-30 RX ORDER — ZOLPIDEM TARTRATE 6.25 MG/1
6.25 TABLET, FILM COATED, EXTENDED RELEASE ORAL NIGHTLY PRN
Qty: 90 TABLET | Refills: 0 | Status: SHIPPED | OUTPATIENT
Start: 2023-10-30 | End: 2024-01-28

## 2023-10-30 NOTE — PROGRESS NOTES
CHIEF COMPLAINT: Juno Varela is a 78 y.o. male who presents for : Follow-up anxiety insomnia    HPI: Patient presented with follow-up of the above he has been doing fine there is no evidence of addiction dependency no evidence of tolerance    Review of Systems:   Constitutional:  Denies fever or chills   Eyes:  Denies change in visual acuity   HENT:  Denies nasal congestion or sore throat   Respiratory:  Denies cough or shortness of breath   Cardiovascular:  Denies chest pain or edema   GI:  Denies abdominal pain, nausea, vomiting, bloody stools or diarrhea   :  Denies dysuria   Musculoskeletal:  Denies back pain or joint pain   Integument:  Denies rash   Neurologic:  Denies headache, focal weakness or sensory changes   Endocrine:  Denies polyuria or polydipsia   Lymphatic:  Denies swollen glands   Psychiatric:  Denies depression or anxiety     Past Medical History:        Diagnosis Date    Anxiety     BPH (benign prostatic hyperplasia) 5/31/2012    CAD (coronary artery disease)     Cancer ([address])     SKIN    Hyperlipidemia     LBBB (left bundle branch block) 5/31/2012    Primary insomnia 10/31/2019    Sleep apnea 2011    USE CPAP       Past Surgical History:        Procedure Laterality Date    ANKLE FRACTURE SURGERY      ANKLE SURGERY Left 10/1/2020    REMOVAL OF HARDWARE LEFT ANKLE performed by Kvng Lopez MD at [address] [place] [address]    MOHS SURGERY Right 03/11/2020    Right Mid Vertex Scalp    MOHS SURGERY  01/27/2021    R Forehead    TOTAL ANKLE ARTHROPLASTY Left 10/1/2020    LEFT ANKLE REPLACEMENT, GASTROCNEMIUS RECESSION; performed by Kvng Lopez MD at [facility] OR       Family History:  Family History   Problem Relation Age of Onset    Cancer Mother     Heart Disease Father     Thyroid Disease Brother        Social History:  Social History     Socioeconomic History    Marital status:    Tobacco Use    Smoking status: Never    Smokeless tobacco: Never   Vaping

## 2023-11-13 ENCOUNTER — OFFICE VISIT (OUTPATIENT)
Dept: ENDOCRINOLOGY | Age: 78
End: 2023-11-13
Payer: MEDICARE

## 2023-11-13 DIAGNOSIS — E11.9 TYPE 2 DIABETES MELLITUS TREATED WITHOUT INSULIN (HCC): Primary | ICD-10-CM

## 2023-11-13 PROCEDURE — 3044F HG A1C LEVEL LT 7.0%: CPT

## 2023-11-13 PROCEDURE — 97803 MED NUTRITION INDIV SUBSEQ: CPT

## 2023-11-13 NOTE — PROGRESS NOTES
capsule Take 1 capsule by mouth every morning (before breakfast) 90 capsule 1    triamcinolone (KENALOG) 0.1 % cream Apply to affected aresa on the right chest and right thigh twice daily for up to 2 weeks or until improved. 30 g 2    olmesartan (BENICAR) 20 MG tablet Take 1 tablet by mouth      JUBLIA 10 % SOLN APPLY TO THE RIGHT BIG TONAIL AND 4TH TOENAIL ONCE A DAY AFTER BATHING 4 mL 0    Vitamin D (CHOLECALCIFEROL) 1000 UNITS CAPS capsule Take 1 capsule by mouth daily       No current facility-administered medications for this visit. NUTRITION ASSESSMENT    Biochemical Data:  Reference range:  Normal <5.7%  Prediabetes: 5.7 - 6.4%  Diabetes: >6.4%  Glycemic control for adults with diabetes: <7.0 %     Lab Results   Component Value Date    LABA1C 6.7 01/26/2023     Lab Results   Component Value Date    .6 01/26/2023       Lab Results   Component Value Date    CHOL 143 02/09/2022    CHOL 145 09/10/2020    CHOL 141 07/25/2019     Lab Results   Component Value Date    TRIG 91 02/09/2022    TRIG 101 09/10/2020    TRIG 130 07/25/2019     Lab Results   Component Value Date    HDL 59 02/09/2022    HDL 52 09/10/2020    HDL 49 07/25/2019     Lab Results   Component Value Date    LDLCALC 66 02/09/2022    LDLCALC 73 09/10/2020    LDLCALC 66 07/25/2019     Lab Results   Component Value Date    LABVLDL 18 02/09/2022    LABVLDL 20 09/10/2020    LABVLDL 26 07/25/2019     No results found for: \"CHOLHDLRATIO\"    Lab Results   Component Value Date    WBC 8.7 04/11/2023    HGB 13.7 04/11/2023    HCT 40.7 04/11/2023    MCV 91.1 04/11/2023     04/11/2023       Lab Results   Component Value Date    CREATININE 1.3 04/21/2023    BUN 18 04/21/2023     04/21/2023    K 4.4 04/21/2023     04/21/2023    CO2 24 04/21/2023       Anthropometric Measurements:   Wt:   Wt Readings from Last 3 Encounters:   09/26/23 81.6 kg (180 lb)   07/27/23 82.1 kg (181 lb)   04/11/23 85.7 kg (189 lb)      BMI:   BMI Readings from

## 2023-12-04 ENCOUNTER — OFFICE VISIT (OUTPATIENT)
Dept: INTERNAL MEDICINE CLINIC | Age: 78
End: 2023-12-04
Payer: MEDICARE

## 2023-12-04 VITALS
BODY MASS INDEX: 28.78 KG/M2 | TEMPERATURE: 98.2 F | SYSTOLIC BLOOD PRESSURE: 158 MMHG | WEIGHT: 181 LBS | DIASTOLIC BLOOD PRESSURE: 84 MMHG | OXYGEN SATURATION: 98 % | HEART RATE: 85 BPM

## 2023-12-04 DIAGNOSIS — K14.0 GLOSSITIS: Primary | ICD-10-CM

## 2023-12-04 PROCEDURE — 99213 OFFICE O/P EST LOW 20 MIN: CPT | Performed by: INTERNAL MEDICINE

## 2023-12-04 PROCEDURE — 1123F ACP DISCUSS/DSCN MKR DOCD: CPT | Performed by: INTERNAL MEDICINE

## 2023-12-04 NOTE — PROGRESS NOTES
children: None    Years of education: None    Highest education level: None   Tobacco Use    Smoking status: Never    Smokeless tobacco: Never   Vaping Use    Vaping Use: Never used   Substance and Sexual Activity    Alcohol use: Yes     Alcohol/week: 1.7 standard drinks of alcohol     Types: 2 Standard drinks or equivalent per week     Comment: OCC    Drug use: Never     Social Determinants of Health     Financial Resource Strain: Low Risk  (3/14/2023)    Overall Financial Resource Strain (CARDIA)     Difficulty of Paying Living Expenses: Not hard at all   Transportation Needs: Unknown (3/14/2023)    PRAPARE - Transportation     Lack of Transportation (Non-Medical): No   Physical Activity: Sufficiently Active (7/27/2023)    Exercise Vital Sign     Days of Exercise per Week: 5 days     Minutes of Exercise per Session: 40 min   Housing Stability: Unknown (3/14/2023)    Housing Stability Vital Sign     Unstable Housing in the Last Year: No         Allergies:  Patient has no known allergies. Current Medications:    Prior to Admission medications    Medication Sig Start Date End Date Taking? Authorizing Provider   miles mixture (MILES MIXTURE) Swish and swallow 5 mLs 4 times daily 12/4/23  Yes James Paez MD   zolpidem (AMBIEN CR) 6.25 MG extended release tablet Take 1 tablet by mouth nightly as needed for Sleep for up to 90 days. Max Daily Amount: 6.25 mg 10/30/23 1/28/24  James Paez MD   LORazepam (ATIVAN) 0.5 MG tablet Take 1 tablet by mouth every 8 hours as needed for Anxiety for up to 90 days.  10/24/23 1/22/24  James Paez MD   tamsulosin (FLOMAX) 0.4 MG capsule TAKE 1 CAPSULE BY MOUTH DAILY 8/10/23   James Paez MD   metFORMIN (GLUCOPHAGE-XR) 500 MG extended release tablet TAKE 1 TABLET BY MOUTH DAILY WITH BREAKFAST 6/13/23   James Paez MD   rosuvastatin (CRESTOR) 20 MG tablet TAKE 1 TABLET BY MOUTH  DAILY 5/15/23   James Paez MD   clopidogrel (PLAVIX) 75 MG tablet TAKE 1 TABLET BY MOUTH  DAILY

## 2023-12-04 NOTE — TELEPHONE ENCOUNTER
Pharmacy said they cannot fill this prescription unless you give them the ingredients and amount of each ingredient.       miles mixture (MILES MIXTURE)     Please call and advise      [address] [facility] [address] [place] [address], [address] [place] [address]

## 2024-01-22 DIAGNOSIS — F51.01 PRIMARY INSOMNIA: ICD-10-CM

## 2024-01-22 RX ORDER — LORAZEPAM 0.5 MG/1
0.5 TABLET ORAL EVERY 8 HOURS PRN
Qty: 270 TABLET | Refills: 0 | Status: SHIPPED | OUTPATIENT
Start: 2024-01-22 | End: 2024-04-21

## 2024-01-30 ENCOUNTER — OFFICE VISIT (OUTPATIENT)
Dept: INTERNAL MEDICINE CLINIC | Age: 79
End: 2024-01-30
Payer: MEDICARE

## 2024-01-30 VITALS — TEMPERATURE: 98.2 F | BODY MASS INDEX: 28.62 KG/M2 | OXYGEN SATURATION: 98 % | HEART RATE: 68 BPM | WEIGHT: 180 LBS

## 2024-01-30 DIAGNOSIS — C02.9 TONGUE CANCER (HCC): ICD-10-CM

## 2024-01-30 DIAGNOSIS — Z01.818 PREOP EXAMINATION: Primary | ICD-10-CM

## 2024-01-30 DIAGNOSIS — I25.119 CORONARY ARTERY DISEASE INVOLVING NATIVE CORONARY ARTERY WITH ANGINA PECTORIS, UNSPECIFIED WHETHER NATIVE OR TRANSPLANTED HEART (HCC): ICD-10-CM

## 2024-01-30 DIAGNOSIS — G47.33 OBSTRUCTIVE SLEEP APNEA SYNDROME: ICD-10-CM

## 2024-01-30 PROCEDURE — 1123F ACP DISCUSS/DSCN MKR DOCD: CPT | Performed by: INTERNAL MEDICINE

## 2024-01-30 PROCEDURE — 99214 OFFICE O/P EST MOD 30 MIN: CPT | Performed by: INTERNAL MEDICINE

## 2024-01-30 ASSESSMENT — PATIENT HEALTH QUESTIONNAIRE - PHQ9
SUM OF ALL RESPONSES TO PHQ QUESTIONS 1-9: 0
1. LITTLE INTEREST OR PLEASURE IN DOING THINGS: 0
SUM OF ALL RESPONSES TO PHQ9 QUESTIONS 1 & 2: 0
SUM OF ALL RESPONSES TO PHQ QUESTIONS 1-9: 0
2. FEELING DOWN, DEPRESSED OR HOPELESS: 0

## 2024-01-30 NOTE — PROGRESS NOTES
Preoperative Consultation      Miguel Angel Hernandez  YOB: 1945    Date of Service:  1/30/2024    Chief Complaint   Patient presents with    Pre-op Exam     Oral surgery - removal of cancer from right side of tongue.       This patient presents to the office today for a preoperative consultation at the request of surgeon, Dr. Perdomo    To have radical neck dissection for tongue cancer  Planned anesthesia:  General  Known anesthesia problems: None  Bleeding risk:  Off  plavix/ASA  Personal or FH of DVT/PE:  None  Patient objection to receiving blood products: No    Past Medical History:   Diagnosis Date    Anxiety     BPH (benign prostatic hyperplasia) 5/31/2012    CAD (coronary artery disease)     Cancer ([facility])     SKIN    Hyperlipidemia     LBBB (left bundle branch block) 5/31/2012    Primary insomnia 10/31/2019    Sleep apnea 2011    USE CPAP       Past Surgical History:   Procedure Laterality Date    ANKLE FRACTURE SURGERY      ANKLE SURGERY Left 10/1/2020    REMOVAL OF HARDWARE LEFT ANKLE performed by Tu Villela MD at [facility] OR    CARDIAC CATHETERIZATION  2006    MOHS SURGERY Right 03/11/2020    Right Mid Vertex Scalp    MOHS SURGERY  01/27/2021    R Forehead    TOTAL ANKLE ARTHROPLASTY Left 10/1/2020    LEFT ANKLE REPLACEMENT, GASTROCNEMIUS RECESSION; performed by Tu Villela MD at [facility] OR       No Known Allergies    No outpatient medications have been marked as taking for the 1/30/24 encounter (Office Visit) with Alan Quesada MD.       Family History   Problem Relation Age of Onset    Cancer Mother     Heart Disease Father     Thyroid Disease Brother        Social History     Socioeconomic History    Marital status:      Spouse name: Not on file    Number of children: Not on file    Years of education: Not on file    Highest education level: Not on file   Occupational History    Not on file   Tobacco Use    Smoking status: Never    Smokeless tobacco: Never   Vaping Use

## 2024-02-15 DIAGNOSIS — I25.10 CAD (CORONARY ARTERY DISEASE): ICD-10-CM

## 2024-02-15 DIAGNOSIS — E78.5 HYPERLIPEMIA: ICD-10-CM

## 2024-02-15 DIAGNOSIS — E78.5 HYPERLIPIDEMIA, UNSPECIFIED HYPERLIPIDEMIA TYPE: ICD-10-CM

## 2024-02-15 DIAGNOSIS — R55 SYNCOPE, NEAR: ICD-10-CM

## 2024-02-15 DIAGNOSIS — Z95.5 S/P CORONARY ARTERY STENT PLACEMENT: ICD-10-CM

## 2024-02-15 DIAGNOSIS — I45.10 RBBB (RIGHT BUNDLE BRANCH BLOCK): ICD-10-CM

## 2024-02-16 RX ORDER — ROSUVASTATIN CALCIUM 20 MG/1
20 TABLET, COATED ORAL DAILY
Qty: 100 TABLET | Refills: 2 | Status: SHIPPED | OUTPATIENT
Start: 2024-02-16

## 2024-02-16 RX ORDER — CLOPIDOGREL BISULFATE 75 MG/1
75 TABLET ORAL DAILY
Qty: 100 TABLET | Refills: 2 | Status: SHIPPED | OUTPATIENT
Start: 2024-02-16

## 2024-03-05 DIAGNOSIS — I25.10 CAD (CORONARY ARTERY DISEASE): ICD-10-CM

## 2024-03-05 DIAGNOSIS — E78.5 HYPERLIPEMIA: ICD-10-CM

## 2024-03-05 DIAGNOSIS — R55 SYNCOPE, NEAR: ICD-10-CM

## 2024-03-05 DIAGNOSIS — I45.10 RBBB (RIGHT BUNDLE BRANCH BLOCK): ICD-10-CM

## 2024-03-05 DIAGNOSIS — Z95.5 S/P CORONARY ARTERY STENT PLACEMENT: ICD-10-CM

## 2024-03-05 NOTE — TELEPHONE ENCOUNTER
Pt stopped by requesting  refills on the medications listed below:     LORazepam (ATIVAN) 0.5 MG tablet     Please send to:     [id number] - [place] - [address] - P [phone] - F [phone]  [address] [place] [address]  Phone: [phone]  Fax: [phone]       zolpidem (AMBIEN CR) 6.25 MG extended release tablet     Please send to:     [facility] # [id number] - [place], [place] - [address] - P [phone] - F [phone]     Please advise pt at : [phone]

## 2024-03-07 ENCOUNTER — TELEPHONE (OUTPATIENT)
Dept: INTERNAL MEDICINE CLINIC | Age: 79
End: 2024-03-07

## 2024-03-07 DIAGNOSIS — F51.01 PRIMARY INSOMNIA: ICD-10-CM

## 2024-03-07 RX ORDER — GUAIFEN/P-PROPANOLAMIN/CODEINE
6.25 EXPECTORANT ORAL NIGHTLY PRN
Qty: 90 TABLET | Refills: 0 | Status: SHIPPED
Start: 2024-03-07 | End: 2024-03-07 | Stop reason: DRUGHIGH

## 2024-03-07 NOTE — TELEPHONE ENCOUNTER
Pt is needing the medication listed below re sent to pharmacy as the 6.25mg instead of the 12 mg that was sent over yesterday.            zolpidem (AMBIEN CR) 6.25 MG extended release tablet [[id number]]  [facility] # [id number] - [place], [place] - [address] - P [phone] - F [phone]

## 2024-03-07 NOTE — TELEPHONE ENCOUNTER
Pharm calling stating that pt says dosage on script is wrong so pharm needs a new script with correct doseage of 6.25mg  AMBIEN CR 12.5 MG extended release tablet [[id number]]  DISCONTINUED      Please call and advise   Ph. [phone]

## 2024-03-12 ENCOUNTER — OFFICE VISIT (OUTPATIENT)
Dept: DERMATOLOGY | Age: 79
End: 2024-03-12
Payer: MEDICARE

## 2024-03-12 DIAGNOSIS — L81.4 SOLAR LENTIGINOSIS: ICD-10-CM

## 2024-03-12 DIAGNOSIS — L57.0 ACTINIC KERATOSIS: Primary | ICD-10-CM

## 2024-03-12 DIAGNOSIS — L30.4 INTERTRIGO: ICD-10-CM

## 2024-03-12 PROCEDURE — 1123F ACP DISCUSS/DSCN MKR DOCD: CPT | Performed by: DERMATOLOGY

## 2024-03-12 PROCEDURE — 99213 OFFICE O/P EST LOW 20 MIN: CPT | Performed by: DERMATOLOGY

## 2024-03-12 RX ORDER — TRIAMCINOLONE ACETONIDE 1 MG/G
CREAM TOPICAL
Qty: 30 G | Refills: 2 | Status: SHIPPED | OUTPATIENT
Start: 2024-03-12

## 2024-03-12 NOTE — PROGRESS NOTES
- several scaly skin colored to pink macules.     2.  Extremities with multiple round smooth light brown macules.     3.  Inguinal folds with mild erythema.        Assessment and Plan     1. Actinic keratoses - several on the vertex scalp    Efudex cream twice daily to the vertex scalp for 14 days. We discussed the likely side effects of treatment including redness, crusting, itching and burning.       2. Solar lentiginosis     Monitor for change.  Sun protective behaviors encouraged including use of at least SPF 30 or more sunscreen.      3. Intertrigo of the groin - mild    Triamcinolone 0.1% cream twice daily for up to 2 weeks or until improved.           Return in about 6 months (around 9/12/2024).    --Juan Navarrete MD

## 2024-03-12 NOTE — PATIENT INSTRUCTIONS
Fluorouracil (Efudex / Carac)    Your physician has prescribed a topical medication that is used to treat pre-cancerous skin lesions and certain types of skin cancers. We are providing you with the following information so that you understand how the medication should be used and potential side effects you may experience.    Clean and dry the areas of the skin that will be treated.     Apply a small amount of the medication to your fingertip. Dab the medication onto the designated areas and rub it in.                                                             Discontinue the medication once the skin starts to scab. Typically this takes between 2 and 4 weeks after starting the medication.      Avoid applying the medication in or around the eyes or eyelids. If the medication gets into your eyes, nose or mouth, rinse immediately.                                                   Wash your hands immediately after application.     Side effects include: burning, redness, irritation, dryness, pain, swelling and changes in skin color. Vaseline and/or cool compresses may be applied to affected areas for comfort.     Please note that you may be more sensitive to the sun. Use sunscreen and wear protective clothing when outdoors. Avoid prolonged sun exposure.    Once you have discontinued the medication, apply vaseline several times daily until the skin has healed.

## 2024-04-05 ENCOUNTER — TELEPHONE (OUTPATIENT)
Dept: PHARMACY | Facility: CLINIC | Age: 79
End: 2024-04-05

## 2024-04-11 DIAGNOSIS — F51.01 PRIMARY INSOMNIA: ICD-10-CM

## 2024-04-11 RX ORDER — VALSARTAN 40 MG/1
40 TABLET ORAL DAILY
COMMUNITY

## 2024-04-11 RX ORDER — QUETIAPINE FUMARATE 25 MG/1
12.5 TABLET, FILM COATED ORAL
COMMUNITY

## 2024-04-11 RX ORDER — DOXAZOSIN MESYLATE 1 MG/1
1 TABLET ORAL NIGHTLY
COMMUNITY

## 2024-04-11 RX ORDER — PANTOPRAZOLE SODIUM 40 MG/1
40 TABLET, DELAYED RELEASE ORAL DAILY
COMMUNITY

## 2024-04-12 RX ORDER — LORAZEPAM 0.5 MG/1
TABLET ORAL
Qty: 270 TABLET | Refills: 0 | Status: SHIPPED | OUTPATIENT
Start: 2024-04-12 | End: 2024-07-11

## 2024-04-26 NOTE — TELEPHONE ENCOUNTER
[facility] CLINICAL PHARMACY REVIEW: ADHERENCE     Attempted to reach patient via telephone again on 4/26/24. Left message asking to return call.    Have also drafted a letter to send to patient, which has been routed.     Gonzalo Caldwell, PharmD Candidate 2024  [facility] Student  [facility] Clinical Pharmacy  Department, toll free: [phone], option 1    =======================================================    For Pharmacy Admin Tracking Only  Program: [facility]  CPA in place:  No  Gap Closed?: No   Time Spent (min): 45    
[facility] CLINICAL PHARMACY REVIEW: ADHERENCE    Attempted to reach patient via telephone again on 4/10/24. Left another message asking to return call.    Gonzalo Caldwell, PharmD Candidate 2024  [facility] Student  [facility] Clinical Pharmacy  Department, toll free: [phone], option 1    
this medication    Attempted to reach patient via telephone and left a message asking to return call.    Planning on discussing his medications that were prescribed after his hospital discharge in February, in addition to if he's checking his blood pressure at home. This will help guide adherence discussion.    Recent Visits  Date Type Provider Dept   01/30/24 Office Visit Alan Quesada MD [facility]   12/04/23 Office Visit Alan Quesada MD [facility] Im   10/30/23 Office Visit Alan Quesada MD [facility]   07/27/23 Office Visit Alan Quesada MD [facility]   04/21/23 Office Visit Alan Quesada MD [facility]   04/11/23 Office Visit Alan Quesada MD [facility]   03/14/23 Office Visit Alan Quesada MD [facility]   01/25/23 Office Visit Alan Quesada MD [facility]   Showing recent visits within past 540 days with a meds authorizing provider and meeting all other requirements  Future Appointments  No visits were found meeting these conditions.  Showing future appointments within next 150 days with a meds authorizing provider and meeting all other requirements    Future Appointments   Date Time Provider Department Center   9/4/2024  9:00 AM Juan Navarrete MD [facility] Shell, PharmD Candidate 2024  [facility] Student  [facility] Clinical Pharmacy  Department, toll free: [phone], option 1

## 2024-05-07 ENCOUNTER — HOSPITAL ENCOUNTER (EMERGENCY)
Age: 79
Discharge: HOME | End: 2024-05-08
Attending: EMERGENCY MEDICINE
Payer: MEDICARE
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
## 2024-05-07 DIAGNOSIS — S01.81XA FACIAL LACERATION, INITIAL ENCOUNTER: Primary | ICD-10-CM

## 2024-05-07 PROCEDURE — 99284 EMERGENCY DEPT VISIT MOD MDM: CPT

## 2024-05-07 PROCEDURE — 12011 RPR F/E/E/N/L/M 2.5 CM/<: CPT

## 2024-05-07 ASSESSMENT — PAIN SCALES - GENERAL: PAINLEVEL_OUTOF10: 1

## 2024-05-07 ASSESSMENT — PAIN - FUNCTIONAL ASSESSMENT: PAIN_FUNCTIONAL_ASSESSMENT: 0-10

## 2024-05-07 ASSESSMENT — PAIN DESCRIPTION - LOCATION: LOCATION: HEAD

## 2024-05-08 ENCOUNTER — APPOINTMENT (OUTPATIENT)
Dept: CT IMAGING | Age: 79
End: 2024-05-08
Payer: MEDICARE

## 2024-05-08 VITALS
SYSTOLIC BLOOD PRESSURE: 170 MMHG | DIASTOLIC BLOOD PRESSURE: 71 MMHG | TEMPERATURE: 98.7 F | RESPIRATION RATE: 18 BRPM | BODY MASS INDEX: 28.19 KG/M2 | HEART RATE: 97 BPM | HEIGHT: 67 IN | OXYGEN SATURATION: 99 %

## 2024-05-08 PROCEDURE — 90471 IMMUNIZATION ADMIN: CPT | Performed by: PHYSICIAN ASSISTANT

## 2024-05-08 PROCEDURE — 70450 CT HEAD/BRAIN W/O DYE: CPT

## 2024-05-08 PROCEDURE — 6360000002 HC RX W HCPCS: Performed by: PHYSICIAN ASSISTANT

## 2024-05-08 PROCEDURE — 70486 CT MAXILLOFACIAL W/O DYE: CPT

## 2024-05-08 PROCEDURE — 72125 CT NECK SPINE W/O DYE: CPT

## 2024-05-08 PROCEDURE — 90715 TDAP VACCINE 7 YRS/> IM: CPT | Performed by: PHYSICIAN ASSISTANT

## 2024-05-08 RX ADMIN — TETANUS TOXOID, REDUCED DIPHTHERIA TOXOID AND ACELLULAR PERTUSSIS VACCINE, ADSORBED 0.5 ML: 5; 2.5; 8; 8; 2.5 SUSPENSION INTRAMUSCULAR at 01:03

## 2024-05-08 NOTE — DISCHARGE INSTRUCTIONS
Thank you for choosing [facility] for your emergency care today. We take a lot of pride in the fact that you chose us for your care and we strived to do everything necessary to provide you with excellent medical care. We hope you agree that you received excellent care today.    To continue that excellent medical care, the following information very important that you read and understand before you leave the emergency department today. It contains information about:  Your diagnosis  What was done for you in the emergency department  What you can expect from your illness or injury moving forward  The steps you will need to take after leaving the emergency department to help achieve the best possible outcome for your illness or injury.    What we did in the Emergency Department Today:     You were seen in the Emergency Department today by Joselito Lyons PA-C.     You had the following lab abnormalities:  Labs Reviewed - No data to display    If no result appears for the test, then it was within normal limits. If the test is pending, the hospital will aj you if the results are abnormal as soon as the test is completed.    You had the following diagnostic imaging:  CT CERVICAL SPINE WO CONTRAST   Final Result      Multilevel degenerative changes of the cervical spine as described.      No acute findings.      Electronically signed by Mallory Gill MD      CT MAXILLOFACIAL WO CONTRAST   Final Result      No acute bony findings.      Multiple radiodensities in the region of the right tongue and bilateral submandibular regions. Correlate clinically.      Electronically signed by Mallory Gill MD      CT HEAD WO CONTRAST   Final Result      No acute intracranial findings.      Electronically signed by Mallory Gill MD          You were given the following medications during your stay in the Emergency Department:  No orders of the defined types were placed in this  modifications with your primary care or prescribing physician as soon as possible):   Current Discharge Medication List        Current Discharge Medication List           You should continue to take the following home medications as prescribed by your physician:   Current Discharge Medication List        CONTINUE these medications which have NOT CHANGED    Details   LORazepam (ATIVAN) 0.5 MG tablet TAKE 1 TABLET BY MOUTH EVERY 8 HOURS AS NEEDED FOR ANXIETY. MAX DAILY AMOUNT: 1.5 MG  Qty: 270 tablet, Refills: 0    Associated Diagnoses: Primary insomnia      QUEtiapine (SEROQUEL) 25 MG tablet Take 0.5 tablets by mouth nightly      valsartan (DIOVAN) 40 MG tablet Take 1 tablet by mouth daily      pantoprazole (PROTONIX) 40 MG tablet Take 1 tablet by mouth daily      metoprolol tartrate (LOPRESSOR) 25 MG tablet Take 0.5 tablets by mouth 2 times daily      doxazosin (CARDURA) 1 MG tablet Take 1 tablet by mouth nightly      triamcinolone (KENALOG) 0.1 % cream Apply to itchy areas twice daily for up to 2 weeks or until improved.  Qty: 30 g, Refills: 2      rosuvastatin (CRESTOR) 20 MG tablet TAKE 1 TABLET BY MOUTH DAILY  Qty: 100 tablet, Refills: 2    Comments: Please send a replace/new response with 100-Day Supply if appropriate to maximize member benefit. Requesting 1 year supply.  Associated Diagnoses: Hyperlipemia; CAD (coronary artery disease); S/P coronary artery stent placement; RBBB (right bundle branch block); Syncope, near; Hyperlipidemia, unspecified hyperlipidemia type      clopidogrel (PLAVIX) 75 MG tablet TAKE 1 TABLET BY MOUTH DAILY  Qty: 100 tablet, Refills: 2    Comments: Please send a replace/new response with 100-Day Supply if appropriate to maximize member benefit. Requesting 1 year supply.  Associated Diagnoses: S/P coronary artery stent placement; RBBB (right bundle branch block); Syncope, near; Hyperlipidemia, unspecified hyperlipidemia type      !! miles mixture (MILES MIXTURE) Swish and swallow 5

## 2024-05-13 RX ORDER — TAMSULOSIN HYDROCHLORIDE 0.4 MG/1
CAPSULE ORAL
Qty: 100 CAPSULE | Refills: 2 | Status: SHIPPED | OUTPATIENT
Start: 2024-05-13

## 2024-05-24 ASSESSMENT — ENCOUNTER SYMPTOMS
RHINORRHEA: 0
VOMITING: 0
CHEST TIGHTNESS: 0
TROUBLE SWALLOWING: 0
WHEEZING: 0
ABDOMINAL PAIN: 0
NAUSEA: 0
SHORTNESS OF BREATH: 0
COLOR CHANGE: 0
ABDOMINAL DISTENTION: 0
SORE THROAT: 0
COUGH: 0
DIARRHEA: 0
EYE PAIN: 0

## 2024-05-24 NOTE — ED PROVIDER NOTES
THE [facility]  EMERGENCY DEPARTMENT ENCOUNTER          ADVANCED PRACTICE PROVIDER NOTE       Date of evaluation: 5/7/2024    Chief Complaint (from nursing documentation)     Laceration (Patient tripped and fell earlier and hit his head on a rock around 1800, stated that he took a nap and woke up and his pillow was covered in blood, patient is on blood thinners. )    History of Present Illness     Miguel Angel Hernandez is a 78 y.o. male who presents to the emergency department with a complaint of bleeding from a forehead wound. 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 He reports he sustained a wound just above his right eye, on his forehead. He denies loss of consciousness. He reports he was able to get the bleeding stopped himself and was not originally planning on coming to the hospital for evaluation. However, the patient reports that, not long after laying down to go to sleep this evening, he woke up with a \"puddle of blood on his pillow.\" He and his wife became concerned as they could not get the bleeding to stop and came to the ED for evaluation. The patient is not currently on anticoagulant therapy, but is on anti-platelet therapy. Denies lightheadedness, dizziness or disequilibrium.  No vision changes or abnormalities.  No nausea or vomiting.  No abnormal behavior noted per the patient's spouse.    ASSESSMENT / PLAN  (MEDICAL DECISION MAKING)     Miguel Angel Hernandez is admitted to the Emergency Department for evaluation of his chief complaint as described in the history of present illness.  Complete history and physical was performed by me and my attending. Nursing notes, past medical history, surgical history, family history and social history were reviewed and addressed in the HPI.    Miguel Angel Hernandez's is a 78 y.o. male who presents to the emergency

## 2024-06-04 ENCOUNTER — TELEPHONE (OUTPATIENT)
Dept: INTERNAL MEDICINE CLINIC | Age: 79
End: 2024-06-04

## 2024-06-04 NOTE — TELEPHONE ENCOUNTER
[facility] Home health Pt is has NG tube and is having nightly feedings after being D/C from  pt's memory is getting bad discussions were had of a possibly placing in a home. PT's wife declined pt and OT and skilled nursing    Ph [phone]

## 2024-06-06 ENCOUNTER — TELEPHONE (OUTPATIENT)
Dept: INTERNAL MEDICINE CLINIC | Age: 79
End: 2024-06-06

## 2024-06-06 NOTE — TELEPHONE ENCOUNTER
Enid from [facility]([phone]) is calling to get verbal orders to follow for home health care. Please call and advise.

## 2024-06-24 ENCOUNTER — TELEPHONE (OUTPATIENT)
Dept: DERMATOLOGY | Age: 79
End: 2024-06-24

## 2024-06-24 NOTE — TELEPHONE ENCOUNTER
Patient wants to schedule an appointment asap for spots on his head that are not clearing up.  The flouourasil cream is not helping.  When could he come in.  [phone]

## 2024-07-03 ENCOUNTER — OFFICE VISIT (OUTPATIENT)
Dept: DERMATOLOGY | Age: 79
End: 2024-07-03
Payer: MEDICARE

## 2024-07-03 DIAGNOSIS — L57.0 AK (ACTINIC KERATOSIS): Primary | ICD-10-CM

## 2024-07-03 PROCEDURE — 99213 OFFICE O/P EST LOW 20 MIN: CPT | Performed by: DERMATOLOGY

## 2024-07-03 PROCEDURE — 1123F ACP DISCUSS/DSCN MKR DOCD: CPT | Performed by: DERMATOLOGY

## 2024-07-03 RX ORDER — FLUOROURACIL 50 MG/G
CREAM TOPICAL
Qty: 40 G | Refills: 0 | Status: SHIPPED | OUTPATIENT
Start: 2024-07-03

## 2024-07-03 NOTE — PROGRESS NOTES
[facility]  Juan Navarrete MD  [phone]      Miguel Angel Hernandez  1945    78 y.o. male     Date of Visit: 7/3/2024    Chief Complaint: skin lesions    History of Present Illness:    He presents today for persistent scaly lesions on the forehead and scalp.  Scalp has improved with Efudex.  Has an older tube and feels it's not responding as it has in the past.      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    Derm Hx:  Small nodular BCC of the right upper arm-treated with curettage on 9/5/2023.  Nodular BCC on the left upper nasolabial fold-treated with Mohs by Dr. Galvez on 9/6/2022.  Superficial BCC on the right lower shin - treated with curettage on 1/21/22.   Superficial basal cell carcinoma of the left upper chest-treated with curettage on 3/5/2021.  Nodular BCC left anterior shoulder-treated with electrodesiccation and curettage on 3/5/2021.  Metatypical BCC on the right forehead-treated with Mohs by Dr. Galvez on 1/27/2021.     Small nodular BCC on the right superior shoulder-treated with curettage at the time of biopsy on 11/17/2020.  Nodular BCC of the right mid vertex scalp-treated with Mohs by Dr. Galvez on 3/11/2020.     Small nodular BCCs on the right anterior upper arm and right mid arm-treated with curettage at the time of biopsy on 2/26/2019.     Nodular BCC on the left upper chest history with curettage at the time of biopsy in October 2018.  Superficial BCC of the right supraclavicular region-treated with curettage at time of biopsy on 1/3/2018.     Nodular BCC on the right thigh - excised on 6/23/17.    Superficial BCC on the right central upper back - treated with curettage on 3/28/17.   Nodular BCC on the left central lower back - treated with curettage on 3/28/17.     Nodular BCC on the right mid thigh-treated with curettage on 10/7/2016.  Superficial basal cell carcinoma left upper chest-treated with

## 2024-07-22 DIAGNOSIS — F51.01 PRIMARY INSOMNIA: ICD-10-CM

## 2024-07-22 RX ORDER — ZOLPIDEM TARTRATE 6.25 MG/1
6.25 TABLET, FILM COATED, EXTENDED RELEASE ORAL NIGHTLY PRN
Qty: 90 TABLET | Refills: 0 | OUTPATIENT
Start: 2024-07-22

## 2024-07-23 DIAGNOSIS — F51.01 PRIMARY INSOMNIA: ICD-10-CM

## 2024-07-23 NOTE — TELEPHONE ENCOUNTER
PTS WIFE IS REQUESTING REFILLS ON THE MEDICATIONS LISTED BELOW:       LORazepam (ATIVAN) 0.5 MG tablet     AMBIEN CR 6.25 MG extended release tablet     HE HAS AN APPT SET FOR  08-    PLEASE SEND TO:     [id number] - [place], [place] - [address] - P [phone] - F [phone] [address] [address]  Phone: [phone]  Fax: [phone]     PLEASE ADVISE PTS WIFE AT: [phone]

## 2024-07-24 RX ORDER — GUAIFEN/P-PROPANOLAMIN/CODEINE
6.25 EXPECTORANT ORAL NIGHTLY PRN
Qty: 90 TABLET | Refills: 0 | Status: SHIPPED | OUTPATIENT
Start: 2024-07-24 | End: 2024-10-22

## 2024-07-24 RX ORDER — LORAZEPAM 0.5 MG/1
TABLET ORAL
Qty: 270 TABLET | Refills: 0 | Status: SHIPPED | OUTPATIENT
Start: 2024-07-24 | End: 2024-10-24

## 2024-07-27 DIAGNOSIS — F51.01 PRIMARY INSOMNIA: ICD-10-CM

## 2024-07-29 RX ORDER — ZOLPIDEM TARTRATE 6.25 MG/1
6.25 TABLET, FILM COATED, EXTENDED RELEASE ORAL NIGHTLY PRN
Qty: 90 TABLET | Refills: 0 | Status: SHIPPED | OUTPATIENT
Start: 2024-07-29 | End: 2024-10-27

## 2024-08-10 DIAGNOSIS — E11.9 TYPE 2 DIABETES MELLITUS TREATED WITHOUT INSULIN (HCC): ICD-10-CM

## 2024-08-10 RX ORDER — METFORMIN HYDROCHLORIDE 500 MG/1
TABLET, EXTENDED RELEASE ORAL
Qty: 90 TABLET | Refills: 3 | Status: SHIPPED | OUTPATIENT
Start: 2024-08-10

## 2024-09-03 ENCOUNTER — TELEPHONE (OUTPATIENT)
Dept: DERMATOLOGY | Age: 79
End: 2024-09-03

## 2024-09-03 NOTE — TELEPHONE ENCOUNTER
Pt stopped by the office said he had an appt with Dr. Navarrete today his appt is not until Oct he did have an appt for 9/4 that was cancelled at last appt and rescheduled for Oct. Pt says he has a spot on his shoulder and one on his leg is insisting on being seen ASAP. Please call [phone].

## 2024-09-03 NOTE — TELEPHONE ENCOUNTER
Tried calling patient but there was no answer and I was unable to leave a voicemail.    Please offer patient opening for 9/16/24 if still available.   Please cancel upcoming appointment for 10/7.

## 2024-09-12 ENCOUNTER — TELEPHONE (OUTPATIENT)
Dept: DERMATOLOGY | Age: 79
End: 2024-09-12

## 2024-09-16 ENCOUNTER — OFFICE VISIT (OUTPATIENT)
Dept: DERMATOLOGY | Age: 79
End: 2024-09-16
Payer: MEDICARE

## 2024-09-16 DIAGNOSIS — D48.5 NEOPLASM OF UNCERTAIN BEHAVIOR OF SKIN: ICD-10-CM

## 2024-09-16 DIAGNOSIS — L57.0 AK (ACTINIC KERATOSIS): Primary | ICD-10-CM

## 2024-09-16 PROCEDURE — 11103 TANGNTL BX SKIN EA SEP/ADDL: CPT | Performed by: DERMATOLOGY

## 2024-09-16 PROCEDURE — 17000 DESTRUCT PREMALG LESION: CPT | Performed by: DERMATOLOGY

## 2024-09-16 PROCEDURE — 17003 DESTRUCT PREMALG LES 2-14: CPT | Performed by: DERMATOLOGY

## 2024-09-16 PROCEDURE — 1123F ACP DISCUSS/DSCN MKR DOCD: CPT | Performed by: DERMATOLOGY

## 2024-09-16 PROCEDURE — 99213 OFFICE O/P EST LOW 20 MIN: CPT | Performed by: DERMATOLOGY

## 2024-09-16 PROCEDURE — 11102 TANGNTL BX SKIN SINGLE LES: CPT | Performed by: DERMATOLOGY

## 2024-09-19 DIAGNOSIS — D04.72 SQUAMOUS CELL CARCINOMA IN SITU OF SKIN OF LEFT LOWER LEG: Primary | ICD-10-CM

## 2024-10-02 LAB
ALBUMIN URINE, EXTERNAL: 25.9
CREATININE, URINE, EXTERNAL: 202.5
MICROALBUMIN/CREAT UR: 12.8 MG/G{CREAT}

## 2024-10-17 NOTE — PATIENT INSTRUCTIONS
Surgical Wound Care Instructions    Sutured Wounds:    After your surgery, go home and take it easy.  Please refrain from any vigorous physical activity or heavy lifting for 14 days.    Leave the pressure bandage in place for 48 hours.  If it starts to detach, reinforce the bandage with another piece of tape.    Please keep the bandage dry for 48 hours.    After 48 hours, the wound can get wet.  Clean the area daily using mild soapy water.    Apply a thin layer of Aquaphor, Vaseline or petroleum jelly.    Apply a non stick gauze pad or bandage to the surgical wound daily and secure with medical tape for 14 days.    Complications:    If bleeding develops when you go home, apply pressure for 15 minutes continuously.  A small amount of ice in a bag covered with a towel may be used for another 10 minutes if necessary.  If the bleeding does not stop, please call your dermatologist.    Call your doctor if you have any of these signs of infection:     Skin around the wound is more red, swollen or feels hot    Wound smells bad    Pus drainage    Fever    Increasing pain

## 2024-10-17 NOTE — PROGRESS NOTES
[facility]  Juan Navarrete MD  [phone]      Miguel Angel Hernandez  1945    78 y.o. male     Date of Visit: 10/18/2024    Chief Complaint: SCC    History of Present Illness:    Here today for excision of a well-differentiated SCC on the left lateral shoulder.    FINAL DIAGNOSIS:        A. Skin of left lateral shoulder, shave biopsy:        - Squamous cell carcinoma, well-differentiated, incompletely          excised.        - Lesion extends to the peripheral edge and base of the biopsy          specimen.       Review of Systems:  Gen: Feels well, good sense of health.  Heme: No abnormal bruising or bleeding.    Past Medical History, Family History, Surgical History, Medications and Allergies reviewed.    Past Medical History:   Diagnosis Date    Anxiety     BPH (benign prostatic hyperplasia) 5/31/2012    CAD (coronary artery disease)     Cancer (HCC)     SKIN    Hyperlipidemia     LBBB (left bundle branch block) 5/31/2012    Primary insomnia 10/31/2019    Sleep apnea 2011    USE CPAP     Past Surgical History:   Procedure Laterality Date    ANKLE FRACTURE SURGERY      ANKLE SURGERY Left 10/1/2020    REMOVAL OF HARDWARE LEFT ANKLE performed by Tu Villela MD at [facility] OR    CARDIAC CATHETERIZATION  2006    MOHS SURGERY Right 03/11/2020    Right Mid Vertex Scalp    MOHS SURGERY  01/27/2021    R Forehead    TOTAL ANKLE ARTHROPLASTY Left 10/1/2020    LEFT ANKLE REPLACEMENT, GASTROCNEMIUS RECESSION; performed by Tu Villela MD at [facility] OR       No Known Allergies  Outpatient Medications Marked as Taking for the 10/18/24 encounter (Procedure visit) with Juan Navarrete MD   Medication Sig Dispense Refill    metFORMIN (GLUCOPHAGE-XR) 500 MG extended release tablet TAKE 1 TABLET BY MOUTH DAILY WITH BREAKFAST 90 tablet 3    zolpidem (AMBIEN CR) 6.25 MG extended release tablet Take 1 tablet by mouth nightly as needed for Sleep for up to 90 days. for sleep for up to 9 days

## 2024-10-18 ENCOUNTER — PROCEDURE VISIT (OUTPATIENT)
Dept: DERMATOLOGY | Age: 79
End: 2024-10-18

## 2024-10-18 DIAGNOSIS — C44.629 SCC (SQUAMOUS CELL CARCINOMA), SHOULDER, LEFT: Primary | ICD-10-CM

## 2024-10-23 ENCOUNTER — PROCEDURE VISIT (OUTPATIENT)
Dept: SURGERY | Age: 79
End: 2024-10-23
Payer: MEDICARE

## 2024-10-23 VITALS — HEART RATE: 80 BPM | DIASTOLIC BLOOD PRESSURE: 86 MMHG | SYSTOLIC BLOOD PRESSURE: 136 MMHG

## 2024-10-23 DIAGNOSIS — Z79.01 ANTICOAGULATED: ICD-10-CM

## 2024-10-23 DIAGNOSIS — C44.729 SQUAMOUS CELL CARCINOMA OF LOWER LEG, LEFT: Primary | ICD-10-CM

## 2024-10-23 PROCEDURE — 17314 MOHS ADDL STAGE T/A/L: CPT | Performed by: DERMATOLOGY

## 2024-10-23 PROCEDURE — 17313 MOHS 1 STAGE T/A/L: CPT | Performed by: DERMATOLOGY

## 2024-10-23 NOTE — PATIENT INSTRUCTIONS
[facility] Office Hours:    Monday-Thursday  7:30 AM-4:30 PM    Friday  9:00 AM-1:00 PM     DAILY WOUND CARE-SECOND INTENTION - LEG    1.  Keep the area absolutely dry for 24 to 48 hours.          -After 24 to 48 hours you may remove the bandage and shower.  2.  Remove the bandage and clean the wound with mild soap and water. Try to clean off crust and debris.            It is important not to allow a scab to form as scabs slow down the healing process.   3. Soak with a diluted vinegar solution of 1 part white table vinegar to 4 parts water for 5 minutes daily to help disinfect the area.    4.  Apply a layer of Vaseline (or Bacitracin if your doctor recommends) to the wound area only.  5.  Cut a piece of Non-stick dressing, such as Telfa, to fit just over the wound and secure it with paper tape. If the wound is small you may use a Band-Aid  6.  Elevate your legs whenever you are in a seated position for more than 15 minutes.  Wear compression stockings or support hose if possible to reduce swelling.    You should continue daily wound care and keep a bandage on until new skin has grown over the entire wound    Bleeding: If bleeding occurs, DO NOT remove the bandage. Put firm pressure on the area with gauze for 20 minutes without peeking. If the bleeding continues, apply pressure for 20 minutes more.  If the bleeding does not stop after you apply pressure, call us right away. If you can’t call, go to the nearest emergency room or urgent care facility.    POST-OPERATIVE INSTRUCTIONS     1. Activity: Do not lift anything heavier than a gallon of milk for 1 week. Also, avoid strenuous activity such as running, power walking or contact sports.  2.  Eating and drinking: Do not drink alcohol for 48 hours after your procedure. Alcohol increases the chances of bleeding.  3.  Medicines:  -If you have discomfort, take acetaminophen (Tylenol or Extra Strength Tylenol). Follow the instructions and warning

## 2024-10-23 NOTE — PROGRESS NOTES
PRE-PROCEDURE SCREENING    Pacemaker/ICD: No  Difficulty with numbing in the past: No  Local Anesthesia Reaction/passing out: No  Latex or adhesive allergy:  No  Any history of reaction to suture or skin glue:  no  Bleeding/Clotting Disorders: No  Anticoagulant Therapy: Yes, plavix and asa 81 mg - stent  Joint prosthesis: Yes, L ankle replacement (approx 2010)  Artificial Heart Valve: No  Stroke or Seizures: No  Organ Transplant or Lymphoma: No  Immunosuppression: No  Respiratory Problems: No

## 2024-10-23 NOTE — PROGRESS NOTES
MOHS PROCEDURE NOTE    PHYSICIAN:  Gali Galvez MD, Who operated in two distinct and integrated capacities as the surgeon removing the tissue and as the pathologist examining the tissue.    ASSISTANT: Feli Bhakta RN, Leopoldo Esposito RN    REFERRING PROVIDER:   Juan Navarrete MD    PREOPERATIVE DIAGNOSIS: Invasive well-differentiated Squamous Cell Carcinoma     SPECIFIC MOHS INDICATIONS:  location, clinically ill-defined borders, and need for tissue conservation    AUC SCORIN/9    POSTOPERATIVE DIAGNOSIS: SAME    LOCATION: Left medial lower leg    OPERATIVE PROCEDURE:  MOHS MICROGRAPHIC SURGERY    RECONSTRUCTION OF DEFECT: Second Intention Wound Healing    PREOPERATIVE SIZE: 15x12 MM    DEFECT SIZE: 25x16 MM    LENGTH OF REPAIRED WOUND/SIZE OF FLAP/SIZE OF GRAFT:  n/a MM    ANESTHESIA:  7mL 1% lidocaine with epinephrine 1:100,000 buffered.     EBL:  MINIMAL    DURATION OF PROCEDURE:  40 MINUTES    POSTOPERATIVE OBSERVATION: 40 MINUTES    SPECIMENS:  SEE MOHS MAP    COMPLICATIONS:  NONE    DESCRIPTION OF PROCEDURE:  The patient was given a mirror, as appropriate, and the biopsy site was identified, marked with a surgical marking pen, and verified by the patient.   Options for treatment were discussed and the patient was informed that Mohs surgery was the selected treatment based on its lower recurrence rate, given the features listed above, as compared to other treatment modalities such as excision, radiation, or curettage, and agreed with this treatment plan.  Risks and benefits including bruising, swelling, bleeding, infection, nerve injury, recurrence, and scarring were discussed with the patient prior to the procedure and a written consent detailing these and other risks was reviewed with the patient and signed.    There was a time out for person and procedure verification.  The surgical site was prepped with an antiseptic solution.  Application of an antiseptic solution was repeated before each

## 2024-10-24 ENCOUNTER — TELEPHONE (OUTPATIENT)
Dept: SURGERY | Age: 79
End: 2024-10-24

## 2024-10-24 NOTE — TELEPHONE ENCOUNTER
The patient was in the office on 10/23/2024 for Mohs located on the LT medial lower leg with 2nd intention wound healing  repair.  The patient tolerated the procedure well and left the office in good condition.    A post-operative telephone call was placed at 2:05p, 10/24/2024, in order to check on the patient's recovery process.  The patient was not able to be reached and a phone message was left. L/M to call w/any questions/concerns.

## 2024-10-25 ENCOUNTER — TELEPHONE (OUTPATIENT)
Dept: SURGERY | Age: 79
End: 2024-10-25

## 2024-10-25 DIAGNOSIS — Z95.5 S/P CORONARY ARTERY STENT PLACEMENT: ICD-10-CM

## 2024-10-25 DIAGNOSIS — I25.10 CAD (CORONARY ARTERY DISEASE): ICD-10-CM

## 2024-10-25 DIAGNOSIS — E78.5 HYPERLIPEMIA: ICD-10-CM

## 2024-10-25 DIAGNOSIS — E78.5 HYPERLIPIDEMIA, UNSPECIFIED HYPERLIPIDEMIA TYPE: ICD-10-CM

## 2024-10-25 DIAGNOSIS — R55 SYNCOPE, NEAR: ICD-10-CM

## 2024-10-25 DIAGNOSIS — I45.10 RBBB (RIGHT BUNDLE BRANCH BLOCK): ICD-10-CM

## 2024-10-25 NOTE — TELEPHONE ENCOUNTER
Spoke with patients wife, Aylin. She states today is the first day removing the post op bandage and she cleaned the area with vinegar/water then applied Vaseline and a new bandage. Patient reports that the itching started prior to today and is inside the wound and the skin around the wound. No rash noted. Discussed post surgical inflammation and itching and that he could take oral OTC antihistamine if needed. If itching is only happening after vinegar/water solution, may need to discontinue to see if that helps. Also discussed adhesive irritation to monitor for. They state they will try the oral OTC antihistamine at this time and follow up Monday if the problem persists.

## 2024-10-25 NOTE — TELEPHONE ENCOUNTER
Patient  had surgery on 10/23 on ankle patient wife called she is doing the vinegar and water solution, but it is itching him really bad, she wanted to know if there is something she can put on it for the itching, she said the wound looks good, but itching for him is out of control.    Please call wife Aylin back. [phone]

## 2024-10-29 RX ORDER — CLOPIDOGREL BISULFATE 75 MG/1
75 TABLET ORAL DAILY
Qty: 100 TABLET | Refills: 2 | Status: SHIPPED | OUTPATIENT
Start: 2024-10-29

## 2024-10-29 RX ORDER — ROSUVASTATIN CALCIUM 20 MG/1
20 TABLET, COATED ORAL DAILY
Qty: 100 TABLET | Refills: 2 | Status: SHIPPED | OUTPATIENT
Start: 2024-10-29

## 2024-10-31 ENCOUNTER — NURSE ONLY (OUTPATIENT)
Dept: DERMATOLOGY | Age: 79
End: 2024-10-31

## 2024-10-31 DIAGNOSIS — Z48.02 VISIT FOR SUTURE REMOVAL: Primary | ICD-10-CM

## 2024-10-31 PROCEDURE — 99024 POSTOP FOLLOW-UP VISIT: CPT | Performed by: DERMATOLOGY

## 2024-11-13 ENCOUNTER — OFFICE VISIT (OUTPATIENT)
Dept: SURGERY | Age: 79
End: 2024-11-13
Payer: MEDICARE

## 2024-11-13 DIAGNOSIS — Z51.89 VISIT FOR WOUND CHECK: Primary | ICD-10-CM

## 2024-11-13 PROCEDURE — 1123F ACP DISCUSS/DSCN MKR DOCD: CPT | Performed by: DERMATOLOGY

## 2024-11-13 PROCEDURE — 1159F MED LIST DOCD IN RCRD: CPT | Performed by: DERMATOLOGY

## 2024-11-13 PROCEDURE — 99213 OFFICE O/P EST LOW 20 MIN: CPT | Performed by: DERMATOLOGY

## 2024-11-13 NOTE — PROGRESS NOTES
S: The patient is here for wound check s/p Mohs surgery on the left lower leg with second intent wound healing, 2 week(s) ago.  The patient states he quit covering it about 1 week ago, no pain.  O:  The wound has dried fibrin and blood.  No erythema/purulence/pain.      A/P:  Chronic problem: is not at treatment goal:  open wound of the left leg s/p Mohs surgery.  Status:  The wound is healing well by second intention.  No s/sx infection/bleeding. Fibrin and eschar debrided with forceps. Encouraged pt to keep area moist and covered to reduce risk of infection and promote faster wound healing.    The area was cleansed with a gentle cleanser, a small amount of Aquaphor and a non-stick dressing applied.   Detailed second intention wound care instructions reviewed with the patient including daily gentle cleansing with mild cleanser such as cetaphil, application of topical petrolatum or aquaphor and wound coverage. Reminder to remove excessive fibrin as necessary, best after cleansing when fibrin is less adherent.  Pt education on how to perform this.  Healing time discussed.  The patient is scheduled for f/u wound check in 4 week(s).    OTC Meds:  use vaseline and keep covered!  Prescription Meds:  no  Co-morbid conditions affecting healing (I.e. tobacco, diabetes, pvd, peripheral edema, immunosuppression):  no

## 2024-11-13 NOTE — PATIENT INSTRUCTIONS
[facility] Office Hours:    Monday-Thursday  7:30 AM-4:30 PM    Friday  9:00 AM-1:00 PM     Start using a Q-tip and roll it around to clean the wound, apply vaseline and cover it each day.  You can take Tylenol an hour prior to the wound care.      DAILY WOUND CARE-SECOND INTENTION - LEG    1.  Keep the area absolutely dry for 24 to 48 hours.          -After 24 to 48 hours you may remove the bandage and shower.  2.  Remove the bandage and clean the wound with mild soap and water. Try to clean off crust and debris.            It is important not to allow a scab to form as scabs slow down the healing process.   3. Soak with a diluted vinegar solution of 1 part white table vinegar to 4 parts water for 5 minutes daily to help disinfect the area.    4.  Apply a layer of Vaseline (or Bacitracin if your doctor recommends) to the wound area only.  5.  Cut a piece of Non-stick dressing, such as Telfa, to fit just over the wound and secure it with paper tape. If the wound is small you may use a Band-Aid  6.  Elevate your legs whenever you are in a seated position for more than 15 minutes.  Wear compression stockings or support hose if possible to reduce swelling.    You should continue daily wound care and keep a bandage on until new skin has grown over the entire wound    Bleeding: If bleeding occurs, DO NOT remove the bandage. Put firm pressure on the area with gauze for 20 minutes without peeking. If the bleeding continues, apply pressure for 20 minutes more.  If the bleeding does not stop after you apply pressure, call us right away. If you can’t call, go to the nearest emergency room or urgent care facility.    POST-OPERATIVE INSTRUCTIONS     1. Activity: Do not lift anything heavier than a gallon of milk for 1 week. Also, avoid strenuous activity such as running, power walking or contact sports.  2.  Eating and drinking: Do not drink alcohol for 48 hours after your procedure. Alcohol increases the

## 2024-12-11 ENCOUNTER — OFFICE VISIT (OUTPATIENT)
Dept: SURGERY | Age: 79
End: 2024-12-11
Payer: MEDICARE

## 2024-12-11 DIAGNOSIS — Z51.89 VISIT FOR WOUND CHECK: Primary | ICD-10-CM

## 2024-12-11 PROCEDURE — 99213 OFFICE O/P EST LOW 20 MIN: CPT | Performed by: DERMATOLOGY

## 2024-12-11 PROCEDURE — 1123F ACP DISCUSS/DSCN MKR DOCD: CPT | Performed by: DERMATOLOGY

## 2024-12-11 PROCEDURE — 1159F MED LIST DOCD IN RCRD: CPT | Performed by: DERMATOLOGY

## 2024-12-11 NOTE — PROGRESS NOTES
S: The patient is here for wound check s/p Mohs surgery on the left medial lower leg with second intent wound healing, 6 week(s) ago.  The patient has no complaints today.  O:  The wound has minimal fibrin, almost healed.  No erythema/purulence/pain.      A/P:  Chronic problem: is not at treatment goal:  open wound of the left medial lower leg s/p Mohs surgery.  Status:  The wound is healing well by second intention.  No s/sx infection/bleeding.     The area was cleansed with a gentle cleanser, a small amount of Aquaphor and a non-stick dressing applied.   Detailed second intention wound care instructions reviewed with the patient including daily gentle cleansing with mild cleanser such as cetaphil, application of topical petrolatum or aquaphor and wound coverage. Reminder to remove excessive fibrin as necessary, best after cleansing when fibrin is less adherent.  Pt education on how to perform this.  Healing time discussed.  The patient is scheduled for f/u wound check prn.    OTC Meds:  Vaseline/Aquahpor x 2 more weeks  Prescription Meds:  none  Co-morbid conditions affecting healing (I.e. tobacco, diabetes, pvd, peripheral edema, immunosuppression):  no    Electronically signed by Feli Bhakta RN on 12/11/2024 at 2:31 PM    IFeli RN, am scribing for and in the presence of Dr.Emily Galvez,12/11/2024.    Gali BYERS MD,  personally performed the services described in this documentation as scribed by Feli Bhakta RN  in my presence, and it is both accurate and complete.     Electronically signed by Gali Galvez MD on 12/11/2024 at 2:33 PM

## 2024-12-13 ENCOUNTER — TELEPHONE (OUTPATIENT)
Dept: PHARMACY | Facility: CLINIC | Age: 79
End: 2024-12-13

## 2024-12-13 NOTE — TELEPHONE ENCOUNTER
[facility] CLINICAL PHARMACY REVIEW: ADHERENCE  Identified care gap per [employer]: fills at [facility]: Diabetes adherence    Patient also appears to be prescribed: ACE/ARB (fail) and Statin (passed)    ASSESSMENT  DIABETES ADHERENCE    Insurance Records claims through 24 (Prior Year PDC = 80% - PASSED; YTD PDC = 84%; Potential Fail Date: 2024):   Metformin ER 500mg daily last filled on 08/10/2024 for 90 day supply. Max refill due: 2024    Last Rx sent on 08/10/2024 for 90ds with 3 refills    Lab Results   Component Value Date    LABA1C 6.8 (H) 2024    LABA1C 6.7 2023    LABA1C 6.6 2022     NOTE A1c <9%     PLAN  The following are interventions that have been identified:   Patient OVERDUE refilling metformin and active on home medication list    Outreach to patient - spoke with wife, Aylin. She shared that patient is no longer following with Dr. Quesada's office anymore - is now following with providers at [facility] since being diagnosed with tongue cancer earlier this year.     Per chart review, can see new IM/FM provider is Rosaura Dey MD. Metformin discontinued at visit with her on 10/30/2024.     Will send letter with information on how to tell insurance that PCP has changed.     No future appointments.    Bereket Bacon, PharmD, BCACP, BCGP  Population Health Pharmacist   [facility] Clinical Pharmacy  Department, toll free: [phone], option 1    =======================================================    For Pharmacy Admin Tracking Only  Program: [facility] Futurederm  CPA in place:  No  Recommendation Provided To: Patient/Caregiver: 1 via Telephone  Intervention Detail: Adherence Monitorin  Intervention Accepted By: Patient/Caregiver: 0  Gap Closed?: No   Time Spent (min): 30

## 2025-01-29 RX ORDER — TAMSULOSIN HYDROCHLORIDE 0.4 MG/1
CAPSULE ORAL
Qty: 100 CAPSULE | Refills: 0 | Status: SHIPPED | OUTPATIENT
Start: 2025-01-29

## 2025-02-04 NOTE — PROGRESS NOTES
show to Admission medications    Medication Sig Start Date End Date Taking? Authorizing Provider   zolpidem (AMBIEN CR) 12.5 MG extended release tablet TAKE ONE TABLET BY MOUTH NIGHTLY AS NEEDED FOR SLEEP. 10/25/18 1/25/19 Yes Silvino Shook MD   LORazepam (ATIVAN) 0.5 MG tablet TAKE ONE TABLET BY MOUTH EVERY 8 HOURS AS NEEDED. 10/25/18 11/25/18 Yes Silvino Shook MD   clopidogrel (PLAVIX) 75 MG tablet Take 1 tablet by mouth daily 10/8/18  Yes Silvino Shook MD   LORazepam (ATIVAN) 1 MG tablet TAKE ONE-HALF TABLET BY MOUTH EVERY 8 HOURS 9/25/18 10/25/18 Yes Silvino Shook MD   JUBLIA 10 % SOLN APPLY TO THE RIGHT BIG TONAIL AND 4TH TOENAIL ONCE A DAY AFTER BATHING 8/16/18  Yes Lisa Farnsworth MD   tamsulosin (FLOMAX) 0.4 MG capsule TAKE 1 CAPSULE BY MOUTH EVERY DAY 8/2/18  Yes Silvino Shook MD   fluorouracil (EFUDEX) 5 % cream Apply to the forehead, temples and sides of the cheeks 2 times daily for 14 days. 2/17/14  Yes Lisa Farnsworth MD   Vitamin D (CHOLECALCIFEROL) 1000 UNITS CAPS capsule Take 1,000 Units by mouth daily. Yes Historical Provider, MD   aspirin 81 MG EC tablet Take 81 mg by mouth M,W,F only   Yes Historical Provider, MD   rosuvastatin (CRESTOR) 20 MG tablet Take 20 mg by mouth daily. Yes Historical Provider, MD   Methylcellulose, Laxative, (CITRUCEL PO) Take  by mouth. Yes Historical Provider, MD       Physical Exam:  Vital Signs: BP (!) 140/80   Ht 5' 8\" (1.727 m)   Wt 204 lb (92.5 kg)   BMI 31.02 kg/m²   General: Patient appears  non-toxic  HENT: Atraumatic, normocephalic, oral mucosa moist  Lungs:  Clear bilaterally  Heart: Regular rate and rhythm  Abdomen: Non-distended, soft, non-tender  Extremities: No edema  Neuro: Nonfocal    Medical Decision Making and Plan:  Pertinent Labs & Imaging studies reviewed. (See chart for details)  None    1.  Coronary artery disease involving native coronary artery with angina pectoris, unspecified whether native or transplanted heart ([address]  Problem is stable continue present meds followed by cardiology    2. Pure hypercholesterolemia  ROM is stable continue present meds    3. Primary insomnia  Problem is stable he stopped the pharmacist about the generics but will continue present meds for now  - zolpidem (AMBIEN CR) 12.5 MG extended release tablet; TAKE ONE TABLET BY MOUTH NIGHTLY AS NEEDED FOR SLEEP. Dispense: 30 tablet; Refill: 2    4. Anxiety  Iliana Saner is stable continue present meds for now  - LORazepam (ATIVAN) 0.5 MG tablet; TAKE ONE TABLET BY MOUTH EVERY 8 HOURS AS NEEDED. Dispense: 90 tablet;  Refill: 2

## 2025-02-13 ENCOUNTER — COMMUNITY OUTREACH (OUTPATIENT)
Dept: INTERNAL MEDICINE CLINIC | Age: 80
End: 2025-02-13

## 2025-02-13 NOTE — PROGRESS NOTES
Patient's HM shows they are overdue for Albumin/Creatinine ratio.  Care Everywhere and  files searched.   updated with 10/2/24 Albumin/Creatinine ratio result.

## 2025-05-12 ENCOUNTER — TELEPHONE (OUTPATIENT)
Age: 80
End: 2025-05-12

## 2025-05-12 NOTE — TELEPHONE ENCOUNTER
Pt wife(Aylin) called to say her  went to see his cancer drBeboFriday. The DrBebo Stated they would like to have a spot looked at that's at the tip of right side of nose.. It's been there for a couple of weeks.Please call his wife at  to schedule appointment.    [phone]

## 2025-05-27 ENCOUNTER — OFFICE VISIT (OUTPATIENT)
Age: 80
End: 2025-05-27

## 2025-05-27 DIAGNOSIS — L57.0 ACTINIC KERATOSIS: ICD-10-CM

## 2025-05-27 DIAGNOSIS — D48.5 NEOPLASM OF UNCERTAIN BEHAVIOR OF SKIN: Primary | ICD-10-CM

## 2025-05-27 DIAGNOSIS — C44.722 SQUAMOUS CELL CARCINOMA OF RIGHT LOWER LEG: ICD-10-CM

## 2025-05-27 NOTE — PATIENT INSTRUCTIONS

## 2025-05-27 NOTE — PROGRESS NOTES
[facility]  Juan Navarrete MD  [phone]      Miguel Angel Hernandez  1945    79 y.o. male     Date of Visit: 5/27/2025    Chief Complaint: skin lesions    History of Present Illness:    1.  Unknown duration of a nonhealing lesion on the left nasal tip.  He also reports a tender lesion on the right lower shin.    2.  He has a history of actinic keratoses and has several lesions on the forehead and scalp today.    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 Galvez on 10/23/2024.  Well-differentiated SCC of the left lateral shoulder-excised on 10/18/2024.  Small nodular BCC of the right upper arm-treated with curettage on 9/5/2023.  Nodular BCC on the left upper nasolabial fold-treated with Mohs by Dr. Galvez on 9/6/2022.  Superficial BCC on the right lower shin - treated with curettage on 1/21/22.   Superficial basal cell carcinoma of the left upper chest-treated with curettage on 3/5/2021.  Nodular BCC left anterior shoulder-treated with electrodesiccation and curettage on 3/5/2021.  Metatypical BCC on the right forehead-treated with Mohs by Dr. Galvez on 1/27/2021.     Small nodular BCC on the right superior shoulder-treated with curettage at the time of biopsy on 11/17/2020.  Nodular BCC of the right mid vertex scalp-treated with Mohs by Dr. Galvez on 3/11/2020.     Small nodular BCCs on the right anterior upper arm and right mid arm-treated with curettage at the time of biopsy on 2/26/2019.     Nodular BCC on the left upper chest history with curettage at the time of biopsy in October 2018.  Superficial BCC of the right supraclavicular region-treated with curettage at time of biopsy on 1/3/2018.     Nodular BCC on the right thigh - excised on 6/23/17.    Superficial BCC on the right central upper back - treated with curettage on 3/28/17.   Nodular BCC on the left central lower back - treated with curettage on 3/28/17.     Nodular BCC on the

## 2025-05-30 ENCOUNTER — RESULTS FOLLOW-UP (OUTPATIENT)
Age: 80
End: 2025-05-30

## 2025-05-30 DIAGNOSIS — C44.311 BASAL CELL CARCINOMA (BCC) OF LEFT NASAL TIP: Primary | ICD-10-CM

## 2025-05-30 LAB — DERMATOLOGY PATHOLOGY REPORT: ABNORMAL

## 2025-06-02 ENCOUNTER — TELEPHONE (OUTPATIENT)
Age: 80
End: 2025-06-02

## 2025-06-12 ENCOUNTER — PROCEDURE VISIT (OUTPATIENT)
Dept: SURGERY | Age: 80
End: 2025-06-12
Payer: MEDICARE

## 2025-06-12 VITALS — HEART RATE: 78 BPM

## 2025-06-12 DIAGNOSIS — C44.311 BASAL CELL CARCINOMA OF NOSE: Primary | ICD-10-CM

## 2025-06-12 DIAGNOSIS — Z79.01 ANTICOAGULATED: ICD-10-CM

## 2025-06-12 PROCEDURE — 17311 MOHS 1 STAGE H/N/HF/G: CPT | Performed by: DERMATOLOGY

## 2025-06-12 PROCEDURE — 14060 TIS TRNFR E/N/E/L 10 SQ CM/<: CPT | Performed by: DERMATOLOGY

## 2025-06-12 NOTE — PATIENT INSTRUCTIONS
[facility] Office Hours:    Monday-Thursday  7:30 AM-4:30 PM    Friday  9:00 AM-1:00 PM       POST-OPERATIVE CARE FOR STITCHES  Bandage change after 48 hours    CARING FOR YOUR SURGICAL SITE  The bandage should remain on and completely dry for 48 hours. Do NOT get the bandage wet.  After the first 48 hours, gently remove the remaining part of the bandage. It can be helpful to moisten the bandage edges in the shower. Steri strips may still be on the wound. It is ok, they will fall off slowly with the daily bandage changes.  Gently clean the wound daily with mild soap and water. Try to clean off crust and debris.   Dry (pat) the area with a clean Q-tip or gauze.   Apply a layer of Vaseline/ Aquaphor (or Bacitracin if your doctor recommends) to the wound area only.  Cut a piece of Telfa (or any non-stick dressing) to fit just over the wound and secure it with paper tape. If the wound is small you may use a Band- Aid. Keep area covered for a total of 1 week(s).  If the dressing comes off or if you have questions, or concerns about the dressing, please call the office for instructions!    POST OPERATIVE INSTRUCTIONS    Activity: Do not lift anything heavier than a gallon of milk for 1 week. Also, avoid strenuous activity such as running, power walking or contact sports.  Eating and drinking: Do not drink alcohol for 48 hours after your procedure. Alcohol increases the chances of bleeding.  Medicines   -If you have discomfort, take Acetaminophen (Tylenol or Extra Strength Tylenol). Follow the instructions and warning on the bottle.  -If your doctor has prescribed you an Aspirin daily, please keep taking it. Do not take extra Aspirin or medicines containing Aspirin (such as Emi-[place] and Excedrin) for 48 hours after your procedure.    Bleeding: If bleeding occurs, DO NOT remove the bandage. Put firm pressure on the area with gauze for 20 minutes without peeking. If the bleeding continues, apply

## 2025-06-12 NOTE — PROGRESS NOTES
PRE-PROCEDURE SCREENING    Pacemaker/ICD: No  Difficulty with numbing in the past: No  Local Anesthesia Reaction/passing out: No  Latex or adhesive allergy:  No  Any history of reaction to suture or skin glue:  no  Bleeding/Clotting Disorders: No  Anticoagulant Therapy: Yes, plavix and asa 81 mg - stent  Joint prosthesis: Yes, L ankle replacement (approx 2010)  Artificial Heart Valve: No  Stroke or Seizures: No  Organ Transplant or Lymphoma: No  Immunosuppression: No  Respiratory Problems: No   
surgical stage.      Stage I:  The clinically-apparent tumor was carefully defined and debulked, determining the edge of the surgical excision.    A thin layer of tumor-laden tissue was excised with a narrow margin of normal-appearing skin, using the technique of Mohs.  A map was prepared to correspond to the area of skin from which it was excised.    Hemostasis was achieved using electrosurgery.  The wound was bandaged.     The tissue was prepared for the cryostat and sectioned. 1 section(s) prepared. Each section was coded, cut, and stained for microscopic examination. The entire base and margins of the excised piece of tissue were examined by the surgeon.  The tissue was examined to the level of subcutaneous fat.    No tumor was identified at the peripheral margins of stage I of microscopically controlled surgery.          DEFECT MANAGEMENT:    REPAIR DESCRIPTION:  Various closure modalities were discussed with the patient, and it was decided that a V to Y Advancement Flap (non-tunneled island pedicle flap) would best preserve normal anatomic and functional relationships. Additional risks of flap necrosis, irregular scar line and wound dehiscence were discussed.     The patient was placed on the procedure room table. The area was anesthetized with 1% lidocaine with epinephrine 1:100,000 buffered, was given a sterile prep using Chlorhexidine gluconate 4% solution  and draped in the usual sterile fashion. Incisions were made in the appropriate fashion for the flap designed.  A triangular shaped incision was made superior to the defect with its apex at the mid nasal dorsum.  The flap was incised deeply at its medial aspect and undermined widely in a subcutaneous plane.  At the lateral aspect a muscular hinge was maintained and the flap was advanced into the defect inferiorly . The wound was extensively undermined and meticulous hemostasis were obtained using spot monopolar electrocoagulation. The flap was elevated

## 2025-06-13 ENCOUNTER — TELEPHONE (OUTPATIENT)
Dept: SURGERY | Age: 80
End: 2025-06-13

## 2025-06-13 NOTE — TELEPHONE ENCOUNTER
The patient was in the office on 6/12/2025 for mohs surgery located on the left nasal tip with v to y adv flap repair.  The patient tolerated the procedure well and left the office in good condition.    Pain level on post-operative day 1:  spoke w patients wife, she states he is doing well took 1 tyenol last night for mild discomfort. Today he is doing well.    Any bleeding episode that required pressure to be held, bandage change or a call to the office or MD?  no     Any other issues?:  no    A post-operative telephone call was placed at 1013 in order to check on the patient's recovery process.  The patient reported doing well and had no complaints other than those listed above, if any.  All of the patient's questions were answered.

## 2025-06-19 ENCOUNTER — OFFICE VISIT (OUTPATIENT)
Dept: SURGERY | Age: 80
End: 2025-06-19

## 2025-06-19 DIAGNOSIS — Z48.02 VISIT FOR SUTURE REMOVAL: Primary | ICD-10-CM

## 2025-06-19 PROCEDURE — 99024 POSTOP FOLLOW-UP VISIT: CPT | Performed by: DERMATOLOGY

## 2025-06-19 NOTE — PATIENT INSTRUCTIONS
[facility] Office Hours:    Monday-Thursday  7:30 AM-4:30 PM    Friday  9:00 AM-1:00 PM     WOUND CARE AFTER SUTURE REMOVAL    After your stiches have been removed, your scar is still very fragile. In fact, scars continue to change and evolve, what we call remodel, for about a year after your procedure.  Follow the following steps below to ensure that your scar heals well.    Instructions    1. If Steri-strips were applied, keep them on until they fall off on their own.  2. Protect your scar from the sun. Use a sunscreen or bandage to cover your scar. Sun exposure can cause your scar to become discolored and appear red or brown.  3. To help soften your scar more rapidly, it is helpful, but not necessary, for you to   massage the scar gently each night for twenty minutes.   4. “Spitting” suture. Occasionally, an inside suture (stitch) does not completely dissolve. When this happens, (generally 4-8 weeks after surgery), it causes a bump or “pimple” to form on the scar. This is easily removed and is not at all serious. It   does not mean the skin cancer has returned. Contact us if it happens, but do not be alarmed.      5. If you scar becomes tender, itchy or becomes very large, let Dr. Galvez know.  There    are treatments that can improve the appearance of your scar or help make it more comfortable.

## 2025-06-19 NOTE — PROGRESS NOTES
S:  The patient is here for suture removal s/p Mohs surgery on the left nasal tip and V to Y Advancement Flap (non-tunneled island pedicle flap) repair, 1 week(s) ago. The site appears well-healed without signs of infection (redness, pain or discharge). The sutures were removed.  Slight superficial sloughing but overall flap looking very good.  Wound care and activity instructions given.  The patient was scheduled for follow-up 2 months for scar/wound check.  The patient was scheduled for f/u with General Dermatology per their instructions.      Electronically signed by Feli Bhakta RN on 6/19/2025 at 11:10 AM    IFeli RN, am scribing for and in the presence of Dr.Emily Galvez,6/19/2025.    Gali BYERS MD,  personally performed the services described in this documentation as scribed by eFli Bhakta RN  in my presence, and it is both accurate and complete.       Electronically signed by Gali Galvez MD on 6/19/2025 at 12:29 PM

## 2025-08-05 ENCOUNTER — OFFICE VISIT (OUTPATIENT)
Age: 80
End: 2025-08-05
Payer: MEDICARE

## 2025-08-05 DIAGNOSIS — L28.0 LICHEN SIMPLEX CHRONICUS: ICD-10-CM

## 2025-08-05 DIAGNOSIS — L91.0 HYPERTROPHIC SCAR: Primary | ICD-10-CM

## 2025-08-05 PROCEDURE — 1123F ACP DISCUSS/DSCN MKR DOCD: CPT | Performed by: DERMATOLOGY

## 2025-08-05 PROCEDURE — 99213 OFFICE O/P EST LOW 20 MIN: CPT | Performed by: DERMATOLOGY

## 2025-08-05 PROCEDURE — 1159F MED LIST DOCD IN RCRD: CPT | Performed by: DERMATOLOGY

## 2025-08-05 RX ORDER — CLOBETASOL PROPIONATE 0.5 MG/G
OINTMENT TOPICAL
Qty: 15 G | Refills: 0 | Status: SHIPPED | OUTPATIENT
Start: 2025-08-05

## 2025-08-19 ENCOUNTER — OFFICE VISIT (OUTPATIENT)
Dept: SURGERY | Age: 80
End: 2025-08-19

## 2025-08-19 DIAGNOSIS — L90.5 SCAR OF NOSE: Primary | ICD-10-CM

## 2025-08-19 PROCEDURE — 99024 POSTOP FOLLOW-UP VISIT: CPT | Performed by: DERMATOLOGY

## (undated) DEVICE — Z INACTIVE NO SUPPLIER SOLUTIONIRRIG 3000ML 0.9% SOD CHL FLX CONT [79720808] [HOSPIRA WORLDWIDE INC]

## (undated) DEVICE — E-Z CLEAN, NON-STICK, PTFE COATED, ELECTROSURGICAL BLADE ELECTRODE, 2.5 INCH (6.35 CM): Brand: EZ CLEAN

## (undated) DEVICE — ROYAL SILK SURGICAL GOWN, XL: Brand: CONVERTORS

## (undated) DEVICE — ROLL COT W11.5INXL11FT 1LB HIGHLY ABSRB SURG GRD

## (undated) DEVICE — PACK PROCEDURE SURG TOTAL KNEE

## (undated) DEVICE — GARMENT,MEDLINE,DVT,INT,CALF,MED, GEN2: Brand: MEDLINE

## (undated) DEVICE — PEEL-AWAY HOOD: Brand: FLYTE, SURGICOOL

## (undated) DEVICE — COAXIAL HIGH FLOW TIP WITH SOFT SHIELD

## (undated) DEVICE — PADDING UNDERCAST W4INXL4YD 100% COT CRIMPED FINISH WBRL II

## (undated) DEVICE — SYRINGE MED 10ML LUERLOCK TIP W/O SFTY DISP

## (undated) DEVICE — 3M™ STERI-DRAPE™ U-DRAPE 1015: Brand: STERI-DRAPE™

## (undated) DEVICE — K WIRE FIX L6IN DIA0.062IN 1600662] MICROAIRE SURGICAL INSTRUMENTS INC]

## (undated) DEVICE — PLATE ES AD W 9FT CRD 2

## (undated) DEVICE — DRESSING,GAUZE,XEROFORM,CURAD,1"X8",ST: Brand: CURAD

## (undated) DEVICE — C-ARMOR C-ARM EQUIPMENT COVERS CLEAR STERILE UNIVERSAL FIT 12 PER CASE: Brand: C-ARMOR

## (undated) DEVICE — SPLINT ORTH W5XL30IN WHT FBRGLS 1 SIDE FELT PD CNFRM LO

## (undated) DEVICE — HANDPIECE SUCTION TUBING INTERPULSE 10FT

## (undated) DEVICE — APPLICATOR MEDICATED 26 CC SOLUTION HI LT ORNG CHLORAPREP

## (undated) DEVICE — DRAPE C ARM UNIV W41XL74IN CLR PLAS XR VELC CLSR POLY STRP

## (undated) DEVICE — ORTHO PRE OP PACK: Brand: MEDLINE INDUSTRIES, INC.

## (undated) DEVICE — THIN OFFSET (13.3 X 0.38 X 42.0MM)

## (undated) DEVICE — BLADE SAW W12.5MM D70MM CUT THK0.94MM BRASSELER HUB RECIP

## (undated) DEVICE — SYRINGE MED 30ML STD CLR PLAS LUERLOCK TIP N CTRL DISP

## (undated) DEVICE — COUNTER NDL 40 COUNT HLD 70 NUM FOAM BLK SGL MAG W BLDE REMV

## (undated) DEVICE — SUTURE ETHLN SZ 3-0 L18IN NONABSORBABLE BLK FS-1 L24MM 3/8 663H

## (undated) DEVICE — GLOVE SURG SZ 65 THK91MIL LTX FREE SYN POLYISOPRENE

## (undated) DEVICE — BLADE SAW LG 70X13X1.24 MM STRYKR SALTO TALARIS

## (undated) DEVICE — ROYAL SILK SURGICAL GOWN, XXXL, LONG: Brand: CONVERTORS

## (undated) DEVICE — SUTURE VCRL SZ 2-0 L27IN ABSRB UD L26MM CT-2 1/2 CIR J269H

## (undated) DEVICE — GOWN,SIRUS,POLYRNF,BRTHSLV,XLN/XL,20/CS: Brand: MEDLINE

## (undated) DEVICE — INTENDED FOR TISSUE SEPARATION, AND OTHER PROCEDURES THAT REQUIRE A SHARP SURGICAL BLADE TO PUNCTURE OR CUT.: Brand: BARD-PARKER ® CARBON RIB-BACK BLADES

## (undated) DEVICE — 3M™ IOBAN™ 2 ANTIMICROBIAL INCISE DRAPE 6640EZ: Brand: IOBAN™ 2

## (undated) DEVICE — PADDING CAST W4INXL4YD HIGHLY ABSRB THAN COT EZ APPL

## (undated) DEVICE — GLOVE SURG SZ 85 L1185IN FNGR THK75MIL STRW LTX POLYMER

## (undated) DEVICE — PIN DRL 12 PIN STRL DISP

## (undated) DEVICE — JEWISH HOSPITAL TURNOVER KIT: Brand: MEDLINE INDUSTRIES, INC.

## (undated) DEVICE — SOLUTION IV 1000ML 0.9% SOD CHL

## (undated) DEVICE — TRAY PREP DRY W/ PREM GLV 2 APPL 6 SPNG 2 UNDPD 1 OVERWRAP

## (undated) DEVICE — BANDAGE,ELASTIC,ESMARK,STERILE,6"X9',LF: Brand: MEDLINE

## (undated) DEVICE — SUTURE PDS II SZ 2-0 L27IN ABSRB VLT L26MM CT-2 1/2 CIR Z333H

## (undated) DEVICE — DRESSING,GAUZE,XEROFORM,CURAD,5"X9",ST: Brand: CURAD

## (undated) DEVICE — PODIATRY PK

## (undated) DEVICE — GAUZE,SPONGE,4"X4",16PLY,XRAY,STRL,LF: Brand: MEDLINE

## (undated) DEVICE — BLADE SAW W21MM D85MM CUT THK1.27MM STRYKR SYS 7 HUB DISP

## (undated) DEVICE — STERILE POLYISOPRENE POWDER-FREE SURGICAL GLOVES WITH EMOLLIENT COATING: Brand: PROTEXIS

## (undated) DEVICE — BANDAGE COBAN 4 IN COMPR W4INXL5YD FOAM COHESIVE QUIK STK SELF ADH SFT

## (undated) DEVICE — SUTURE VCRL SZ 3-0 L27IN ABSRB UD L26MM SH 1/2 CIR J416H

## (undated) DEVICE — Device